# Patient Record
Sex: MALE | Race: WHITE | NOT HISPANIC OR LATINO | Employment: OTHER | URBAN - METROPOLITAN AREA
[De-identification: names, ages, dates, MRNs, and addresses within clinical notes are randomized per-mention and may not be internally consistent; named-entity substitution may affect disease eponyms.]

---

## 2020-05-11 ENCOUNTER — OFFICE VISIT (OUTPATIENT)
Dept: NEUROLOGY | Facility: CLINIC | Age: 70
End: 2020-05-11

## 2020-05-11 ENCOUNTER — TELEPHONE (OUTPATIENT)
Dept: NEUROLOGY | Facility: CLINIC | Age: 70
End: 2020-05-11

## 2020-05-11 VITALS
HEART RATE: 78 BPM | WEIGHT: 183 LBS | BODY MASS INDEX: 27.74 KG/M2 | OXYGEN SATURATION: 96 % | HEIGHT: 68 IN | SYSTOLIC BLOOD PRESSURE: 140 MMHG | DIASTOLIC BLOOD PRESSURE: 78 MMHG

## 2020-05-11 DIAGNOSIS — Z76.89 ENCOUNTER TO ESTABLISH CARE: Primary | ICD-10-CM

## 2020-05-11 DIAGNOSIS — G31.83 LEWY BODY DEMENTIA WITHOUT BEHAVIORAL DISTURBANCE (HCC): Primary | ICD-10-CM

## 2020-05-11 DIAGNOSIS — F02.80 LEWY BODY DEMENTIA WITHOUT BEHAVIORAL DISTURBANCE (HCC): Primary | ICD-10-CM

## 2020-05-11 PROCEDURE — 99204 OFFICE O/P NEW MOD 45 MIN: CPT | Performed by: PSYCHIATRY & NEUROLOGY

## 2020-05-11 RX ORDER — DONEPEZIL HYDROCHLORIDE 10 MG/1
10 TABLET, FILM COATED ORAL 2 TIMES DAILY
Qty: 180 TABLET | Refills: 3 | Status: SHIPPED | OUTPATIENT
Start: 2020-05-11

## 2020-05-11 RX ORDER — CITALOPRAM 10 MG/1
10 TABLET ORAL DAILY
COMMUNITY
Start: 2020-03-18 | End: 2020-05-11 | Stop reason: SDUPTHER

## 2020-05-11 RX ORDER — MEMANTINE HYDROCHLORIDE 10 MG/1
10 TABLET ORAL 2 TIMES DAILY
Qty: 180 TABLET | Refills: 3 | Status: SHIPPED | OUTPATIENT
Start: 2020-05-11

## 2020-05-11 RX ORDER — MEMANTINE HYDROCHLORIDE 10 MG/1
10 TABLET ORAL 2 TIMES DAILY
COMMUNITY
Start: 2020-04-04 | End: 2020-05-11 | Stop reason: SDUPTHER

## 2020-05-11 RX ORDER — VIT C/B6/B5/MAGNESIUM/HERB 173 50-5-6-5MG
CAPSULE ORAL
COMMUNITY
End: 2020-07-30

## 2020-05-11 RX ORDER — CITALOPRAM 10 MG/1
10 TABLET ORAL DAILY
Qty: 90 TABLET | Refills: 3 | Status: SHIPPED | OUTPATIENT
Start: 2020-05-11 | End: 2022-09-23

## 2020-05-11 RX ORDER — DONEPEZIL HYDROCHLORIDE 10 MG/1
10 TABLET, FILM COATED ORAL DAILY
COMMUNITY
Start: 2020-04-16 | End: 2020-05-11 | Stop reason: SDUPTHER

## 2020-05-11 RX ORDER — CHLORAL HYDRATE 500 MG
CAPSULE ORAL
COMMUNITY
End: 2020-07-30

## 2020-05-11 NOTE — TELEPHONE ENCOUNTER
Per Dr. Alejandro we will send referral to establish care at [facility] for primary care.

## 2020-05-11 NOTE — PROGRESS NOTES
"Subjective     CC: memory impairment    History of Present Illness   Emmanuel Bermudez is a 70 y.o. male who comes to clinic today for evaluation of cognitive impairment. His family has noted progressive symptoms since approximately 2017 marked initially by forgetfulness and word-finding difficulties . This has gradually worsened  over time. Additional associated symptoms have included impairments in essentially all spheres of cognition. There are fleeting visual hallucinations. He has also noted symptoms of micrographia, imbalance and bradykinesia.    He was initially diagnosed with [facility] in [place]. Subsequent evaluation at [facility] led to a diagnosis of DLB (with parkinsonism).    Prior evaluation has included screening blood work  and an MRI of the brain which were unremarkable . He is currently taking donepezil and memantine.    I have reviewed and confirmed the past family, social and medical history as accurate on 5/11/20.     Review of Systems   Constitutional: Negative.    Respiratory: Negative.    Cardiovascular: Negative.    Gastrointestinal: Negative.    Genitourinary: Negative.    Musculoskeletal: Negative.    All other systems reviewed and are negative.      Objective   General appearance today is normal.   Peripheral pulses were present and symmetric.   The ophthalmoscopic exam today is unremarkable. The discs and posterior elements are unremarkable.    /78   Pulse 78   Ht 172.7 cm (68\")   Wt 83 kg (183 lb)   SpO2 96%   BMI 27.83 kg/m²     Physical Exam   Neurological: He has normal strength. He has a normal Finger-Nose-Finger Test.   Reflex Scores:       Tricep reflexes are 1+ on the right side and 1+ on the left side.       Bicep reflexes are 1+ on the right side and 1+ on the left side.       Brachioradialis reflexes are 1+ on the right side and 1+ on the left side.       Patellar reflexes are 1+ on the right side and 1+ on the left side.       Achilles reflexes are 1+ on the right side and " 1+ on the left side.  Psychiatric: His speech is normal.        Neurologic Exam     Mental Status   Oriented to person.   Disoriented to place.   Oriented to time.   Registration: recalls 3 of 3 objects. Recall at 5 minutes: recalls 3 of 3 objects. Follows 3 step commands.   Attention: normal.   Speech: speech is normal   Level of consciousness: alert  Knowledge: good.   Able to name object. Able to read. Able to repeat. Able to write. Normal comprehension.     Cranial Nerves   Cranial nerves II through XII intact.     Motor Exam   Muscle bulk: normal  Overall muscle tone: normal    Strength   Strength 5/5 throughout. Mild-moderate bradykinesia     Sensory Exam   Light touch normal.     Gait, Coordination, and Reflexes     Gait  Gait: (decreased arm swing)    Coordination   Finger to nose coordination: normal    Reflexes   Right brachioradialis: 1+  Left brachioradialis: 1+  Right biceps: 1+  Left biceps: 1+  Right triceps: 1+  Left triceps: 1+  Right patellar: 1+  Left patellar: 1+  Right achilles: 1+  Left achilles: 1+      MMSE=25      Assessment/Plan   Emmanuel was seen today for np memory/dementia.    Diagnoses and all orders for this visit:    Lewy body dementia without behavioral disturbance (CMS/HCC)  -     Ambulatory Referral to Physical Therapy Evaluate and treat  -     Ambulatory Referral to Speech Therapy    Other orders  -     donepezil (ARICEPT) 10 MG tablet; Take 1 tablet by mouth 2 (Two) Times a Day.  -     memantine (NAMENDA) 10 MG tablet; Take 1 tablet by mouth 2 (Two) Times a Day.  -     citalopram (CeleXA) 10 MG tablet; Take 1 tablet by mouth Daily.          DISCUSSION/SUMMARY    Emmanuel Bermudez comes to clinic today with a history of DLB. I reviewed treatment strategies including increasing donepezil to 10 mg bid, a trial of Sinemet for his motoric symptoms, along with referral to PT, and referral to SLP for cognitive rehabilitation. He will then follow up in 6 months , or sooner if needed.     As  part of this visit I obtained additional history from the family which is incorporated in the HPI and review of records from his prior evaluation. Please see above for additional details.

## 2020-05-13 ENCOUNTER — OFFICE VISIT (OUTPATIENT)
Dept: PHYSICAL THERAPY | Facility: CLINIC | Age: 70
End: 2020-05-13

## 2020-05-13 DIAGNOSIS — R41.841 COGNITIVE COMMUNICATION DEFICIT: Primary | ICD-10-CM

## 2020-05-13 DIAGNOSIS — R47.1 DYSARTHRIA: ICD-10-CM

## 2020-05-13 DIAGNOSIS — G31.83 LEWY BODY DEMENTIA WITHOUT BEHAVIORAL DISTURBANCE (HCC): ICD-10-CM

## 2020-05-13 DIAGNOSIS — F02.80 LEWY BODY DEMENTIA WITHOUT BEHAVIORAL DISTURBANCE (HCC): ICD-10-CM

## 2020-05-13 PROCEDURE — 92523 SPEECH SOUND LANG COMPREHEN: CPT | Performed by: SPEECH-LANGUAGE PATHOLOGIST

## 2020-05-14 ENCOUNTER — TREATMENT (OUTPATIENT)
Dept: PHYSICAL THERAPY | Facility: CLINIC | Age: 70
End: 2020-05-14

## 2020-05-14 DIAGNOSIS — G89.29 CHRONIC RIGHT-SIDED LOW BACK PAIN WITHOUT SCIATICA: Primary | ICD-10-CM

## 2020-05-14 DIAGNOSIS — M54.50 CHRONIC RIGHT-SIDED LOW BACK PAIN WITHOUT SCIATICA: Primary | ICD-10-CM

## 2020-05-14 DIAGNOSIS — R26.9 GAIT DISTURBANCE: ICD-10-CM

## 2020-05-14 PROCEDURE — 97161 PT EVAL LOW COMPLEX 20 MIN: CPT | Performed by: PHYSICAL THERAPIST

## 2020-05-14 PROCEDURE — 97110 THERAPEUTIC EXERCISES: CPT | Performed by: PHYSICAL THERAPIST

## 2020-05-14 NOTE — PROGRESS NOTES
Physical Therapy Initial Evaluation and Plan of Care      Patient: Emmanuel Bermudez   : 1950  Diagnosis/ICD-10 Code:  No primary diagnosis found.  Referring practitioner: Jarred Alejandro MD  Date of Initial Visit: 2020  Today's Date: 2020  Patient seen for 1 sessions           Subjective Questionnaire: Oswestry:       Subjective Evaluation    History of Present Illness  Mechanism of injury: The pt stated that he has had R sided LBP for several years and has been treated in the past with injections and physical therapy. He did not get consistent relief from conservative treatments and ultimately had surgery about 1.5 years ago. He was unable to recall what kind of surgery he had but believes it was on his SIJ. He experienced relief from pain until about 3 months ago when the pain returned in the same area. He has since been unable to walk regularly and stated he is not one to push through the pain. He generally feels better when he sits down and uses back support but noted his pain is unpredictable and fluctuates frequently. He has been managing pain with acetaminophen and has tried heat, ice, and pain-relieving creams but does not feel they are effective. The pt has LBD and has been experiencing worsening gait, balance, and rigidity for the last few years. He has stairs at home and navigates them reciprocally with a hand rail. He has had 2-3 falls in the last year as a result of tripping but has not had any injuries. He does not use an AD.    Pain  Current pain rating: 3  At best pain ratin  At worst pain ratin  Location: R sided LBP  Quality: sharp and dull ache  Relieving factors: rest and support  Aggravating factors: ambulation, repetitive movement, prolonged positioning and lifting  Progression: worsening    Social Support  Lives in: multiple-level home    Diagnostic Tests  No diagnostic tests performed    Treatments  Previous treatment: medication, physical therapy and injection  treatment  Patient Goals  Patient goals for therapy: increased strength, independence with ADLs/IADLs, return to sport/leisure activities, increased motion, improved balance and decreased pain             Objective          Postural Observations    Additional Postural Observation Details  Flattened lower lumbar spine and severe FHP with inc thoracic kyphosis      Palpation   Left   Muscle spasm in the erector spinae, lumbar paraspinals and quadratus lumborum.   Tenderness of the erector spinae, lumbar paraspinals and quadratus lumborum.     Right   Muscle spasm in the erector spinae, lumbar paraspinals and quadratus lumborum. Tenderness of the erector spinae, lumbar paraspinals and quadratus lumborum.     Tenderness     Lumbar Spine  Tenderness in the facet joint (L2-4). No tenderness in the spinous process.     Left Hip   No tenderness in the PSIS.     Right Hip   No tenderness in the PSIS.     Neurological Testing     Sensation     Lumbar   Left   Intact: light touch    Right   Intact: light touch    Reflexes   Left   Patellar (L4): normal (2+)  Achilles (S1): normal (2+)    Right   Patellar (L4): brisk (3+)  Achilles (S1): normal (2+)    Active Range of Motion     Additional Active Range of Motion Details  Lumbar flexion: 7 cm to the floor   Lumbar extension: 50%  R Lateral flexion: 0 cm to KJL  L Lateral flexion: 0 cm to KJL  R Rotation: 40 deg  L Rotation: 52 deg      Strength/Myotome Testing     Left Hip   Planes of Motion   Flexion: 4+  Extension: 4-  Abduction: 4    Right Hip   Planes of Motion   Flexion: 4+  Extension: 4-  Abduction: 4-    Left Knee   Flexion: 4+  Extension: 4+    Right Knee   Flexion: 4+  Extension: 4+    Additional Strength Details  Mod bradykinesia      Tests     Lumbar     Left   Negative quadrant.     Right   Negative quadrant.     Left Pelvic Girdle/Sacrum   Negative: sacrum compression, sacral spring and thigh thrust.     Right Pelvic Girdle/Sacrum   Negative: sacrum compression,  sacral spring and thigh thrust.     Ambulation     Comments   The pt ambulated with a crouched posture, dec toño, and reduced step length bilaterally.          Assessment & Plan     Assessment  Impairments: abnormal muscle firing, abnormal or restricted ROM, activity intolerance, impaired physical strength, lacks appropriate home exercise program and pain with function  Assessment details: The patient is a 69 yo male who presented to PT with evolving characteristics of chronic R sided LBP with low complexity. Signs and symptoms are consistent with lumbar hypomobility and associated inc mm tension. He also has LBD that appears to have caused bradykinesia, balance deficits, and rigidity that may be addressed with PT once back pain has resolved. He had back surgery appx 1.5 years ago and the pain is localized to the area around the scar. He had hypomobility of the lumbar spine with PA assessment and palpation of the R facets from L2-4 reproduced pain. Pain was also reproduced palpation of the R sided lumbar mm and inc mm tension was noted throughout. He was prescribed an HEP for lumbar mobility exercises and core strengthening. I expect the patient to make a timely recovery with skilled PT intervention.     Prognosis: good  Functional Limitations: carrying objects, lifting, walking, uncomfortable because of pain, stooping and unable to perform repetitive tasks  Goals  Plan Goals: Short Term Goals (4 weeks):     1. The patient will be independent and compliant with initial HEP.     2. The patient will report pain at rest 0/10 or less and worst pain 4/10 or less.    3. The patient will display decreased TTP in the lumbar spine and dec mm tension in the surrounding musculature.    4. B hip strength will improve to 4+/5 in abd and ext.     5. JAGRUTI will improve by 6 points or more.     6. Distal symptoms will centralize above the knee.     7. The patient will perform 10 STS with appropriate knee position and no inc in  pain.      Long Term Goals (8 weeks):     1. The patient will be appropriate for independent management and compliant with progressed HEP.     2. The patient will report pain at rest 0/10 or less and worst pain 2/10 or less.    3. The patient will return to work duties and/or ADLs with no limitations due to LBP.     4. The patient will return to recreational and community activities with no limitations due to LBP.     5. The patient will report no distal symptoms.       Plan  Therapy options: will be seen for skilled physical therapy services  Planned modality interventions: cryotherapy, electrical stimulation/Russian stimulation, TENS, iontophoresis, thermotherapy (hydrocollator packs) and traction  Planned therapy interventions: abdominal trunk stabilization, manual therapy, motor coordination training, neuromuscular re-education, ADL retraining, body mechanics training, flexibility, functional ROM exercises, home exercise program, joint mobilization, postural training, soft tissue mobilization, spinal/joint mobilization, strengthening, stretching and therapeutic activities  Frequency: 1x week  Duration in visits: 8  Duration in weeks: 8  Treatment plan discussed with: patient  Plan details: The patient will likely benefit from TE/NMED to include postural reeducation and core strengthening, MT for improved joint mobility, and TA for activity modification and lifting mechanics. Modalities will be utilized as needed for pain modulation.         Visit Diagnoses:  No diagnosis found.    Timed:  Manual Therapy:    0     mins  18619;  Therapeutic Exercise:    15     mins  25724;     Neuromuscular Ian:    0    mins  58317;    Therapeutic Activity:     0     mins  85202;     Gait Trainin     mins  46329;     Ultrasound:     0     mins  04290;    Electrical Stimulation:    0     mins  06954 ( );    Untimed:  Electrical Stimulation:    0     mins  00162 ( );  Mechanical Traction:    0     mins   22286;     Timed Treatment:   15   mins   Total Treatment:     50   mins    PT SIGNATURE: Pramod Dunlap, LAURA   DATE TREATMENT INITIATED: 5/14/2020    Initial Certification  Certification Period: 8/12/2020  I certify that the therapy services are furnished while this patient is under my care.  The services outlined above are required by this patient, and will be reviewed every 90 days.     PHYSICIAN: Jarred Alejandro MD      DATE:     Please sign and return via fax to  .. Thank you, [facility].

## 2020-05-20 ENCOUNTER — TREATMENT (OUTPATIENT)
Dept: PHYSICAL THERAPY | Facility: CLINIC | Age: 70
End: 2020-05-20

## 2020-05-20 DIAGNOSIS — Z78.9 IMPAIRED MOBILITY AND ADLS: Primary | ICD-10-CM

## 2020-05-20 DIAGNOSIS — R47.1 DYSARTHRIA: ICD-10-CM

## 2020-05-20 DIAGNOSIS — R29.898 DECREASED GRIP STRENGTH: ICD-10-CM

## 2020-05-20 DIAGNOSIS — Z74.09 IMPAIRED MOBILITY AND ADLS: Primary | ICD-10-CM

## 2020-05-20 DIAGNOSIS — G31.83 LEWY BODY DEMENTIA WITHOUT BEHAVIORAL DISTURBANCE (HCC): ICD-10-CM

## 2020-05-20 DIAGNOSIS — F02.80 LEWY BODY DEMENTIA WITHOUT BEHAVIORAL DISTURBANCE (HCC): ICD-10-CM

## 2020-05-20 DIAGNOSIS — R27.8 DECREASED COORDINATION: ICD-10-CM

## 2020-05-20 DIAGNOSIS — R41.841 COGNITIVE COMMUNICATION DEFICIT: Primary | ICD-10-CM

## 2020-05-20 PROCEDURE — 92507 TX SP LANG VOICE COMM INDIV: CPT | Performed by: SPEECH-LANGUAGE PATHOLOGIST

## 2020-05-20 PROCEDURE — 97166 OT EVAL MOD COMPLEX 45 MIN: CPT | Performed by: OCCUPATIONAL THERAPIST

## 2020-05-20 NOTE — PROGRESS NOTES
"Outpatient Speech Language Pathology   Adult Speech Language Cognitive Treatment Note       Patient Name: Emmanuel Bermudez  : 1950  MRN: [id number]  Today's Date: 2020         Visit Date: 2020   Patient Active Problem List   Diagnosis   • Lewy body dementia without behavioral disturbance (CMS/HCC)          Visit Dx:    ICD-10-CM ICD-9-CM   1. Cognitive communication deficit R41.841 799.52   2. Dysarthria R47.1 784.51   3. Lewy body dementia without behavioral disturbance (CMS/HCC) G31.83 331.82    F02.80 294.10     Pain ratin- back pain  Subjective: \"Nothing new\". Had OT evaluation before session today.     LTGs   Pt will comprehend spoken information in all settings at 80% with cues   -Initiated goals today. Went over evaluation results   Pt and family will implement compensatory strategies to maximize patient’s memory function so patient can continue to participate in daily activities at 80% with cues   -Introduced and modeled some compensatory strategies for memory today for application at home   STGs  Pt will improve comprehension of spoken language by following 1 step directions at 80% with cues   -70% today with no cues   Pt will demonstrate comprehension of spoken language by answering simple general information questions at 80% with cues   -75% today with no cues with general information questions   Pt will improve executive functioning skills by using planning strategies prior to beginning tasks at 80% with cues   -Modeled planning strategies throughout tasks. Continue to model and target   Pt will improve executive functioning skills by using self-monitoring strategies during functional tasks at 80% with cues   -Modeled self monitoring strategies today before/during/ after tasks.    Pt will improve respiratory support and the use of respiration for speech through use of diaphragmatic breathing and prolonging phonation of a vowel sound at 80% with cues    -Introduced diaphragmatic " "breathing today. Modeled for pt and he was able to perform x20   Pt will improve comprehensibility of verbal messages by speaking at an appropriate vocal intensity at 80% with cues   -Introduced \"thinking loud\" and increasing vocal volume with functional phrase list made in session   Pt’s memory skills will be enhanced as reported by patient using external memory aides at 80% with cues   -Made a daily schedule together, modeled how to utilize at home. Sent home example sheet  Pts’s memory skills will be enhanced as reported by caregiver by using a memory book developed by SLP and family to help patient recall important personal information. at 80% with cues   -introduced concept of memory book today. Pt was hesitant about it.   Recertification: 8/12/2020          OP SLP Education     Row Name 05/20/20 1100       Education    Barriers to Learning  No barriers identified  -RB    Education Provided  Described results of evaluation;Patient expressed understanding of evaluation;Patient demonstrated recommended strategies;Patient requires further education on strategies, risks  -RB    Assessed  Learning needs;Learning motivation;Learning preferences;Learning readiness  -RB    Learning Motivation  Strong  -RB    Learning Method  Explanation;Demonstration;Teach back;Written materials  -RB    Teaching Response  Verbalized understanding;Demonstrated understanding;Reinforcement needed  -RB    Education Comments  breathing excercises, word finding cue sheet, functional phrase list for dysarthria   -RB      User Key  (r) = Recorded By, (t) = Taken By, (c) = Cosigned By    Initials Name Effective Dates    Julianna Hodges SLP 02/28/20 -           OP SLP Assessment/Plan - 05/20/20 1100        SLP Assessment    Functional Problems  Speech Language- Adult/Cognition   -RB    Impact on Function: Adult Speech Language/Cognition  Difficulty communicating wants, needs, and ideas;Difficulty communicating in a medical " emergency;Restrictions in personal and social life;Difficulty sequencing thoughts to express complex messages;Poor attention to task;Trouble learning or remembering new information;Difficulty participating in avocational activities;Decreased recall of personal information and medical history   -RB    Clinical Impression: Speech Language-Adult/Congnition  Moderate-Severe:;Cognitive Communication Impairment   -RB    Functional Problems Comment  Pt's independence is impacted by cognitive deficits    -RB    Clinical Impression Comments  Pt was recpetive to treatment and strategies presented today    -RB    Please refer to paper survey for additional self-reported information  Yes   -RB    Please refer to items scanned into chart for additional diagnostic informaiton and handouts as provided by clinician  Yes   -RB    SLP Diagnosis  moderate-severe cognitive communication deficit    -RB    Prognosis  Good (comment)   -RB    Patient/caregiver participated in establishment of treatment plan and goals  Yes   -RB    Patient would benefit from skilled therapy intervention  Yes   -RB       SLP Plan    Frequency  1x/weekly   -RB    Duration  11 weeks    -RB    Planned CPT's?  SLP INDIVIDUAL SPEECH THERAPY: 95055   -RB    Expected Duration Therapy Session - minutes  45-60 minutes   -RB    Plan Comments  continue plan of care    -RB      User Key  (r) = Recorded By, (t) = Taken By, (c) = Cosigned By    Initials Name Provider Type    RB Julianna Klein SLP Speech and Language Pathologist              Julianna Klein MA CCC-SLP  5/20/2020

## 2020-05-20 NOTE — PROGRESS NOTES
"Occupational Therapy Initial Evaluation and Plan of Care      Patient: Emmanuel Bermudez   : 1950  Diagnosis/ICD-10 Code:  Impaired mobility and ADLs [Z74.09]  Referring practitioner: Jarred Alejandro MD  Date of Initial Visit: Type: THERAPY  Noted: 2020  Today's Date: 2020  Patient seen for 1 sessions      Subjective:     Subjective Questionnaire: 9HPT R: 28.60  L: 36.498  AMPAC OT 22  PURDUE PEGBOARD R: 6  L:  8  ROW: 5  ASSEMBLY:  10      Subjective Evaluation    History of Present Illness  Date of onset: 2017  Mechanism of injury: Pt with progressive symptoms starting in 2017 with forgetfulness, word-finding difficulty, visual hallucinations, micrographia, bradykinesia and impaired balance and FMC. Pt lives with wife and recently moved to [place] from [place]. Pt being seen by Dr. Alejandro and sx with Lewy Body Dementia. Pt notes various parkinsonian symptoms including decreased bilateral arm swing, tremor of UE's (L worse than R), decreased step length and stiffness. He states his HW has gotten nearly illegible. He is still able to perform most ADL tasks however requires significant more time to get ready, increased effort to complete transfers and bed mobility. He also suffers from significant low back pain and is being seen by PT for this.     Subjective comment: \"I want to be able to at least care for myself\"  Patient Occupation: retired    Precautions and Work Restrictions: memory, fallQuality of life: fair    Pain  Current pain ratin  At best pain ratin  At worst pain rating: 10  Location: low back  Aggravating factors: ambulation (standing)    Social Support  Patient lives at: walk-in shower.  Lives with: spouse    Hand dominance: right    Patient Goals  Patient goals for therapy: increased strength, independence with ADLs/IADLs, increased motion, improved balance and decreased pain  Patient goal: improve FMC         Objective          Active Range of Motion   Left Shoulder   Flexion: " WFL  Extension: WFL  Abduction: WFL  Adduction: WFL    Right Shoulder   Flexion: WFL  Extension: WFL  Abduction: WFL  Adduction: WFL    Left Elbow   Flexion: WFL  Extension: WFL  Forearm supination: WFL  Forearm pronation: WFL    Right Elbow   Flexion: WFL  Extension: WFL  Forearm supination: WFL  Forearm pronation: WFL    Additional Active Range of Motion Details  Mild stiffness noted during pronation/supination  Able to oppose bilaterally however w/decreased speed  Finger to nose, eyes closed intact, bilaterally  Slight stiffness noted with wrist f/e    Strength/Myotome Testing     Left Shoulder     Planes of Motion   Flexion: 4+   Abduction: 4+     Right Shoulder     Planes of Motion   Flexion: 4+   Abduction: 4+     Left Wrist/Hand      (2nd hand position)     Trial 1: 40 lbs    Trial 2: 45 lbs    Trial 3: 45 lbs    Average: 43.33 lbs    Thumb Strength  Key/Lateral Pinch     Trial 1: 18 lbs    Trial 2: 18 lbs    Trial 3: 17 lbs    Average: 17.67 lbs    Right Wrist/Hand      (2nd hand position)     Trial 1: 65 lbs    Trial 2: 60 lbs    Trial 3: 60 lbs    Average: 61.67 lbs    Thumb Strength   Key/Lateral Pinch     Trial 1: 20 lbs    Trial 2: 21 lbs    Trial 3: 21 lbs    Average: 20.67 lbs    Additional Strength Details  Pt denies any N/T in BUEs    Ambulation     Ambulation: Level Surfaces     Additional Level Surfaces Ambulation Details  Decreased bilateral arm swing, no use of AD  Slight forward flexed posture         OT Neuro         OT Exercises     Row Name 05/20/20 0930             Exercise 1    Exercise Name 1  OT IE completed per consult. See other flow sheets for additional info  -ST        User Key  (r) = Recorded By, (t) = Taken By, (c) = Cosigned By    Initials Name Provider Type    Polina Garcia, OTR Occupational Therapist           Assessment & Plan     Assessment  Impairments: abnormal coordination, abnormal gait, activity intolerance, impaired balance, impaired physical strength,  lacks appropriate home exercise program and pain with function  Assessment details: Pt presents with deficits in hand strength and FMC impacting ADL and IADL performance along with decreased standing balance and gait abnormality demonstrating decreased bilateral arm swing. Pt to benefit from hand strengthening, FMC training, AE/DME needs and balance training to improve transfers, standing and ADLs/IADLs.   Prognosis: fair  Functional Limitations: lifting, walking, pushing, uncomfortable because of pain, moving in bed, standing and stooping  Goals  Plan Goals: OT neuro goals      Pt will score 23 seconds or less R & 31 seconds or less L on the 9HPT to demonstrate improved accuracy and speed with fine motor coordination by 8 wks.      Pt will increase L  strength by 5 # by 8 wks to demonstrate improved strength and function for daily tasks.     Pt will be independent with hand strengthening HEP to increase independence with ADL/IADL performance tasks by 4 wks.    Pt will be independent with hand FMC HEP to increase independence with ADL/IADL performance tasks by 4 wks.              Plan  Planned therapy interventions: ADL retraining, balance/weight-bearing training, body mechanics training, fine motor coordination training, flexibility, functional ROM exercises, home exercise program, IADL retraining, motor coordination training, neuromuscular re-education, strengthening, stretching, therapeutic activities and transfer training  Frequency: 1x week  Duration in weeks: 8  Treatment plan discussed with: patient  Plan details: Est. OT POC and goals to address above deficits and improve areas of safety and engagement in ADLs, IADLs, standing tolerance, transfers and balance.         Timed:  Manual Therapy:    0     mins  21618;  Therapeutic Exercise:    0     mins  87953;     Neuromuscular Ian:    0    mins  78071;    Therapeutic Activity:     0     mins  23168;     Self-Care/ADL     0     mins  38645;   Sensory Int.  Tech      0     mins 54777;  Ultrasound:     0     mins  52255;    Electrical Stimulation:    0     mins  55743 ( );    Untimed:  Electrical Stimulation:    0     mins  82637 ( );    Timed Treatment:   40   mins   Total Treatment:     40   mins    OT Signature: Polina Holbrook MS, OTR/L, CDP  [place] License #: [id number]  DATE TREATMENT INITIATED: 5/20/2020    Initial Certification Certification Period: 8/18/2020  I certify that the therapy services are furnished while this patient is under my care. The services outlined above are required by this patient and will be reviewed every 90 days.     Physician Signature: __________________________________  Jarred Alejandro MD    Please sign and return via fax to [phone]  Thank you,   [facility] Occupational Therapy

## 2020-05-22 ENCOUNTER — TREATMENT (OUTPATIENT)
Dept: PHYSICAL THERAPY | Facility: CLINIC | Age: 70
End: 2020-05-22

## 2020-05-22 DIAGNOSIS — R26.9 GAIT DISTURBANCE: ICD-10-CM

## 2020-05-22 DIAGNOSIS — M54.50 CHRONIC RIGHT-SIDED LOW BACK PAIN WITHOUT SCIATICA: Primary | ICD-10-CM

## 2020-05-22 DIAGNOSIS — G89.29 CHRONIC RIGHT-SIDED LOW BACK PAIN WITHOUT SCIATICA: Primary | ICD-10-CM

## 2020-05-22 PROCEDURE — 97110 THERAPEUTIC EXERCISES: CPT | Performed by: PHYSICAL THERAPIST

## 2020-05-22 PROCEDURE — 97140 MANUAL THERAPY 1/> REGIONS: CPT | Performed by: PHYSICAL THERAPIST

## 2020-05-22 NOTE — PROGRESS NOTES
Physical Therapy Daily Progress Note      Patient: Emmanuel Bermudez   : 1950  Referring practitioner: Jarred Alejandro MD  Date of Initial Visit: Type: THERAPY  Noted: 2020  Today's Date: 2020  Patient seen for 2 sessions           Subjective Evaluation    History of Present Illness  Mechanism of injury: The pt stated that his back has felt about the same this week but noted some improvement with HEP performance. He reported feeling groggy this morning and stated mornings are also the worst for his back pain. He stated he has noticed that his back pain gets a little better throughout the day, but pain fluctuates with activity. He is concerned with his ability to sit and stand from a chair and requested to work on it.    Pain  Current pain ratin  Location: LBP           Objective   See Exercise, Manual, and Modality Logs for complete treatment.       Assessment & Plan     Assessment  Assessment details: The pt was lethargic upon presentation and did not display the enthusiasm that he did during his IE. He had a slight inc in LBP today as compared to his last visit but it may be attributable to the time of day, as he reported mornings being worse. He demonstrated proficiency with most of his HEP exercises but struggled with the core control to perform a PPT. Several cues and demonstrations were provided but no consistency was obtained. He reported difficulty with STS so technique was reviewed and they were practiced from an elevated mat table. He was able to perform 10 consecutive STS with no use of the hands and no complaints of pain. MT was attempted including PA glides and STM to the R sided lumbar mm but was not tolerated very well. Significant mm tension was noted in the paraspinals and QL but I was unable to perform DFM to the area. Heat was applied following treatment.    Plan  Plan details: Continue progression of core strengthening exercises, stretches for post mm, and MT.        Visit  Diagnoses:    ICD-10-CM ICD-9-CM   1. Chronic right-sided low back pain without sciatica M54.5 724.2    G89.29 338.29   2. Gait disturbance R26.9 781.2       Progress per Plan of Care           Timed:  Manual Therapy:    15     mins  37162;  Therapeutic Exercise:    30     mins  22917;     Neuromuscular Ian:    0    mins  44515;    Therapeutic Activity:     0     mins  89910;     Gait Trainin     mins  64799;     Ultrasound:     0     mins  53678;    Electrical Stimulation:    0     mins  98317 ( );    Untimed:  Electrical Stimulation:    0     mins  91926 ( );  Mechanical Traction:    0     mins  71629;     Timed Treatment:   45   mins   Total Treatment:     60   mins  Pramod Dunlap PT  Physical Therapist

## 2020-05-27 ENCOUNTER — TREATMENT (OUTPATIENT)
Dept: PHYSICAL THERAPY | Facility: CLINIC | Age: 70
End: 2020-05-27

## 2020-05-27 DIAGNOSIS — Z74.09 IMPAIRED MOBILITY AND ADLS: Primary | ICD-10-CM

## 2020-05-27 DIAGNOSIS — R27.8 DECREASED COORDINATION: ICD-10-CM

## 2020-05-27 DIAGNOSIS — Z78.9 IMPAIRED MOBILITY AND ADLS: Primary | ICD-10-CM

## 2020-05-27 PROCEDURE — 97110 THERAPEUTIC EXERCISES: CPT | Performed by: OCCUPATIONAL THERAPIST

## 2020-05-27 PROCEDURE — 97535 SELF CARE MNGMENT TRAINING: CPT | Performed by: OCCUPATIONAL THERAPIST

## 2020-05-27 NOTE — PROGRESS NOTES
"Occupational Therapy Daily Progress Note  Visit: 2  Date of Initial Visit: Type: THERAPY  Noted: 2020      Patient: Emmanuel Bermudez   : 1950  Diagnosis/ICD-10 Code:  Impaired mobility and ADLs [Z74.09]  Referring practitioner: Jarred Alejandro MD  Date of Initial Visit: Type: THERAPY  Noted: 2020  Today's Date: 2020  Patient seen for 2 sessions      Subjective:   Patient reports: \"I tried to play with the grandchildren some over the weekend   Pain: 0/10  Subjective Questionnaire: n/a  Clinical Progress: unchanged  Home Program Compliance: Yes  Treatment has included: therapeutic exercise and self-care/ADL retraining    Subjective   Objective     OT Neuro         OT Exercises     Row Name 20 0934             Exercise 1    Exercise Name 1  AE training with reacher use for donning/doffing LB clothing to increase efficiency and pain/comfort level   -ST         Exercise 2    Exercise Name 2  FMC with use of flexi-puzzle to address translation and in-hand manipulation alternating R and L hands to increase speed and accuracy of movements, progressed to using wing nut and bolt  -ST         Exercise 3    Exercise Name 3  use of medium soft theraputty for pincer and lateral  strength to increase carry over with ADLs and IADLs  -ST        User Key  (r) = Recorded By, (t) = Taken By, (c) = Cosigned By    Initials Name Provider Type    Polina Garcia OTR Occupational Therapist           Assessment & Plan     Assessment  Assessment details: Pt's parkinsonian symptoms affect balance and bilateral arm swing, educated pt on increasing step length with heel to toe strike and use of arm swing to aid with balance and stability. Pt demonstrates slow, deliberate movements w/ FMC and hand strengthening tasks, requiring additional time and effort, rita with use of flexi-puzzle and any translation/in-hand manipulation. Issued HEPs, pt with appropriate teach-back. Educated and demo'ed use of reacher for " donning/doffing pants/other LB clothing for improving pain/comfort however pt not interested in pursing AE use at this time. Will continue to progress complexity and difficulty of FMC/strengthening tasks as tolerated     Plan  Plan details: Continue POC and goals as previously est to achieve goals and improve ADLs and IADLs.        Visit Diagnoses:    ICD-10-CM ICD-9-CM   1. Impaired mobility and ADLs Z74.09 799.89   2. Decreased coordination R27.9 781.3             Timed:  Manual Therapy:    0     mins  26169;  Therapeutic Exercise:    23     mins  05887;     Neuromuscular Ian:    0    mins  31061;    Therapeutic Activity:     0     mins  68015;     Self-Care/ADL     15     mins  79737;   Sensory Int. Tech      0     mins 98465;  Ultrasound:     0     mins  72055;    Electrical Stimulation:    0     mins  97944 ( );    Untimed:  Electrical Stimulation:    0     mins  43124 ( );    Timed Treatment:   38   mins   Total Treatment:     38   mins    OT Signature: Polina Hlobrook MS, OTR/L, JESSIE  [place] License #: [id number]

## 2020-05-29 ENCOUNTER — TREATMENT (OUTPATIENT)
Dept: PHYSICAL THERAPY | Facility: CLINIC | Age: 70
End: 2020-05-29

## 2020-05-29 DIAGNOSIS — G31.83 LEWY BODY DEMENTIA WITHOUT BEHAVIORAL DISTURBANCE (HCC): ICD-10-CM

## 2020-05-29 DIAGNOSIS — R47.1 DYSARTHRIA: ICD-10-CM

## 2020-05-29 DIAGNOSIS — R26.9 GAIT DISTURBANCE: ICD-10-CM

## 2020-05-29 DIAGNOSIS — R41.841 COGNITIVE COMMUNICATION DEFICIT: Primary | ICD-10-CM

## 2020-05-29 DIAGNOSIS — M54.50 CHRONIC RIGHT-SIDED LOW BACK PAIN WITHOUT SCIATICA: Primary | ICD-10-CM

## 2020-05-29 DIAGNOSIS — G89.29 CHRONIC RIGHT-SIDED LOW BACK PAIN WITHOUT SCIATICA: Primary | ICD-10-CM

## 2020-05-29 DIAGNOSIS — F02.80 LEWY BODY DEMENTIA WITHOUT BEHAVIORAL DISTURBANCE (HCC): ICD-10-CM

## 2020-05-29 PROCEDURE — 92507 TX SP LANG VOICE COMM INDIV: CPT | Performed by: SPEECH-LANGUAGE PATHOLOGIST

## 2020-05-29 PROCEDURE — 97110 THERAPEUTIC EXERCISES: CPT | Performed by: PHYSICAL THERAPIST

## 2020-05-29 PROCEDURE — 97140 MANUAL THERAPY 1/> REGIONS: CPT | Performed by: PHYSICAL THERAPIST

## 2020-05-29 NOTE — PROGRESS NOTES
Physical Therapy Daily Progress Note      Patient: Emmanuel Bermudez   : 1950  Referring practitioner: Jarred Alejandro MD  Date of Initial Visit: Type: THERAPY  Noted: 2020  Today's Date: 2020  Patient seen for 3 sessions           Subjective Evaluation    History of Present Illness  Mechanism of injury: The pt stated that his back pain has fluctuated throughout the week, noting some days with minimal pain and some days with more severe pain. He has not attempted to perform any of his HEP exercises when his pain is increased and simply rests. He stated he is fearful of activities that he thinks may increase pain and avoids them as able. He was unable to remember if his exercises are helping in the moment and could not recall the surgery that he had.     Pain  Current pain ratin  Location: LBP           Objective          Palpation   Left   Hypertonic in the erector spinae and quadratus lumborum.   Tenderness of the erector spinae and quadratus lumborum.     Right   Hypertonic in the erector spinae and quadratus lumborum. Tenderness of the erector spinae and quadratus lumborum.       See Exercise, Manual, and Modality Logs for complete treatment.       Assessment & Plan     Assessment  Assessment details: The pt continues to experience relatively unchanged LBP with excessive mm tension in the lumbar paraspinals and QLs. He is unable to recall how his exercises impact him at home and cannot remember the surgery that he had. Both of these memory limitations are a barrier to rehab, because it is difficult to assess his response to prescribed exercises and activities. I wrote him a reminder to bring his surgical health records so that I could have a better picture of his current spine health. Functional WB exercises were introduced today and were tolerated fairly well, but he reported inc R sided LBP with hip extension and with standing on the R LE. He struggled with symmetrical positioning with  STS, leading towards the R and shifting his weight over that LE. When this was corrected, he had a dec in pain and improvement in efficiency. He was encouraged to work on this in front of a mirror at home.    Plan  Plan details: Continue progressions of MT and WB core and LE exercises.        Visit Diagnoses:    ICD-10-CM ICD-9-CM   1. Chronic right-sided low back pain without sciatica M54.5 724.2    G89.29 338.29   2. Gait disturbance R26.9 781.2       Progress per Plan of Care           Timed:  Manual Therapy:    10     mins  21307;  Therapeutic Exercise:    30     mins  52626;     Neuromuscular Ian:    0    mins  26080;    Therapeutic Activity:     0     mins  47498;     Gait Trainin     mins  98618;     Ultrasound:     0     mins  20573;    Electrical Stimulation:    0     mins  78429 ( );    Untimed:  Electrical Stimulation:    0     mins  98099 ( );  Mechanical Traction:    0     mins  90998;     Timed Treatment:   40   mins   Total Treatment:     52   mins  Pramod Dunlap PT  Physical Therapist

## 2020-05-29 NOTE — PROGRESS NOTES
Outpatient Speech Language Pathology   Adult Speech Language Cognitive Treatment Note       Patient Name: Emmanuel Bermudez  : 1950  MRN: [id number]  Today's Date: 2020         Visit Date: 2020   Patient Active Problem List   Diagnosis   • Lewy body dementia without behavioral disturbance (CMS/HCC)          Visit Dx:    ICD-10-CM ICD-9-CM   1. Cognitive communication deficit R41.841 799.52   2. Dysarthria R47.1 784.51   3. Lewy body dementia without behavioral disturbance (CMS/HCC) G31.83 331.82    F02.80 294.10        Pain ratin- back pain  Subjective: Pt reports not knowing if he wants intervention and that he feels acceptance with his condition. Discussed this at length.     LTGs   Pt will comprehend spoken information in all settings at 80% with cues   -Addressed comprehension goals today   Pt and family will implement compensatory strategies to maximize patient’s memory function so patient can continue to participate in daily activities at 80% with cues   -modeled some compensatory strategies for memory today for application at home, Discussed ways to incorporate strategies at home    STGs  Pt will improve comprehension of spoken language by following 1 step directions at 80% with cues   -75% today with no cues   Pt will demonstrate comprehension of spoken language by answering simple general information questions at 80% with cues   -80% today with no cues with general information questions. MET x1    Pt will improve executive functioning skills by using planning strategies prior to beginning tasks at 80% with cues   -Modeled planning strategies throughout tasks. 65%  Pt will improve executive functioning skills by using self-monitoring strategies during functional tasks at 80% with cues   -Modeled self monitoring strategies today before/during/ after tasks. 70%  Pt will improve respiratory support and the use of respiration for speech through use of diaphragmatic breathing and prolonging  "phonation of a vowel sound at 80% with cues    - Modeled for pt and he was able to perform x20   Pt will improve comprehensibility of verbal messages by speaking at an appropriate vocal intensity at 80% with cues   -Introduced \"thinking loud\" and increasing vocal volume with functional  reading made in session. Discussed practicing on the phone and with bible verses when he reads at home. Able to perform at 70%    Pt’s memory skills will be enhanced as reported by patient using external memory aides at 80% with cues   -Made a daily schedule together, modeled how to utilize at home.   Pts’s memory skills will be enhanced as reported by caregiver by using a memory book developed by SLP and family to help patient recall important personal information. at 80% with cues   -Discussed a memory book with people's names and pictures   Recertification: 8/12/2020            OP SLP Education     Row Name 05/29/20 1000       Education    Barriers to Learning  No barriers identified  -RB    Education Provided  Patient demonstrated recommended strategies;Patient requires further education on strategies, risks  -RB    Assessed  Learning needs;Learning preferences;Learning readiness;Learning motivation  -RB    Learning Motivation  Moderate  -RB    Learning Method  Explanation;Demonstration;Written materials  -RB    Teaching Response  Verbalized understanding;Demonstrated understanding;Reinforcement needed  -RB    Education Comments  memory strategy sheet, ideas on how to incorporate strategies for speech and cognition at home   -RB      User Key  (r) = Recorded By, (t) = Taken By, (c) = Cosigned By    Initials Name Effective Dates    Julianna Hodges SLP 02/28/20 -           OP SLP Assessment/Plan - 05/29/20 1000        SLP Assessment    Functional Problems  Speech Language- Adult/Cognition   -RB    Impact on Function: Adult Speech Language/Cognition  Difficulty communicating wants, needs, and ideas;Difficulty communicating in a " "medical emergency;Restrictions in personal and social life;Difficulty participating in avocational activities;Trouble learning or remembering new information;Poor attention to task   -RB    Clinical Impression: Speech Language-Adult/Congnition  Moderate-Severe:;Cognitive Communication Impairment   -RB    Functional Problems Comment  Pt's independence is impacted by deficits    -RB    Clinical Impression Comments  Pt was able to utilize strategies. However, pt notes he has \"accepted\" where he is at and is not sure he wants intervention or services. SLP respects that choice. Pt noted he would think about if he wants to continue to work on skills    -RB    Please refer to paper survey for additional self-reported information  Yes   -RB    Please refer to items scanned into chart for additional diagnostic informaiton and handouts as provided by clinician  Yes   -RB    SLP Diagnosis  moderate-severe cognitive communication deficit    -RB    Prognosis  Good (comment)   -RB    Patient/caregiver participated in establishment of treatment plan and goals  Yes   -RB    Patient would benefit from skilled therapy intervention  Yes   -RB       SLP Plan    Frequency  1x/weekly   -RB    Duration  10 weeks    -RB    Planned CPT's?  SLP INDIVIDUAL SPEECH THERAPY: 52380   -RB    Expected Duration Therapy Session - minutes  45-60 minutes   -RB    Plan Comments  continue plan of care    -RB      User Key  (r) = Recorded By, (t) = Taken By, (c) = Cosigned By    Initials Name Provider Type    Julianna Hodges SLP Speech and Language Pathologist              Julianna Klein MA CCC-SLP  5/29/2020  "

## 2020-06-03 ENCOUNTER — TREATMENT (OUTPATIENT)
Dept: PHYSICAL THERAPY | Facility: CLINIC | Age: 70
End: 2020-06-03

## 2020-06-03 DIAGNOSIS — R47.1 DYSARTHRIA: ICD-10-CM

## 2020-06-03 DIAGNOSIS — Z74.09 IMPAIRED MOBILITY AND ADLS: Primary | ICD-10-CM

## 2020-06-03 DIAGNOSIS — R27.8 DECREASED COORDINATION: ICD-10-CM

## 2020-06-03 DIAGNOSIS — R41.841 COGNITIVE COMMUNICATION DEFICIT: Primary | ICD-10-CM

## 2020-06-03 DIAGNOSIS — G31.83 LEWY BODY DEMENTIA WITHOUT BEHAVIORAL DISTURBANCE (HCC): ICD-10-CM

## 2020-06-03 DIAGNOSIS — R29.898 DECREASED GRIP STRENGTH: ICD-10-CM

## 2020-06-03 DIAGNOSIS — Z78.9 IMPAIRED MOBILITY AND ADLS: Primary | ICD-10-CM

## 2020-06-03 DIAGNOSIS — F02.80 LEWY BODY DEMENTIA WITHOUT BEHAVIORAL DISTURBANCE (HCC): ICD-10-CM

## 2020-06-03 PROCEDURE — 97530 THERAPEUTIC ACTIVITIES: CPT | Performed by: OCCUPATIONAL THERAPIST

## 2020-06-03 PROCEDURE — 92507 TX SP LANG VOICE COMM INDIV: CPT | Performed by: SPEECH-LANGUAGE PATHOLOGIST

## 2020-06-03 NOTE — PROGRESS NOTES
"Outpatient Speech Language Pathology   Adult Speech Language Cognitive Treatment Note/Discharge Summary       Patient Name: Emmanuel Bermudez  : 1950  MRN: [id number]  Today's Date: 6/3/2020         Visit Date: 2020   Patient Active Problem List   Diagnosis   • Lewy body dementia without behavioral disturbance (CMS/HCC)          Visit Dx:    ICD-10-CM ICD-9-CM   1. Cognitive communication deficit R41.841 799.52   2. Dysarthria R47.1 784.51   3. Lewy body dementia without behavioral disturbance (CMS/HCC) G31.83 331.82    F02.80 294.10        Pain ratin- back pain  Subjective: Pt reports no new concerns. \"feeling good\". Discussed discharge from skilled services    LTGs   Pt will comprehend spoken information in all settings at 80% with cues   -Comprehension goals MET at 80%   Pt and family will implement compensatory strategies to maximize patient’s memory function so patient can continue to participate in daily activities at 80% with cues   -modeled some compensatory strategies for memory today for application at home, Discussed ways to incorporate strategies at home. Pt reports utilization at home. MET at 80%    STGs  Pt will improve comprehension of spoken language by following 1 step directions at 80% with cues   -80% today with no cues. MET    Pt will demonstrate comprehension of spoken language by answering simple general information questions at 80% with cues   -80% today with no cues with general information questions. MET   Pt will improve executive functioning skills by using planning strategies prior to beginning tasks at 80% with cues   -Modeled planning strategies throughout tasks. 70%. Goal d/c, continue to address with HEP  Pt will improve executive functioning skills by using self-monitoring strategies during functional tasks at 80% with cues   -Modeled self monitoring strategies today before/during/ after tasks. 75%. Self correction noted throughout tasks. Goal d/c, continue to address " with HEP  Pt will improve respiratory support and the use of respiration for speech through use of diaphragmatic breathing and prolonging phonation of a vowel sound at 80% with cues    - Modeled for pt and he was able to perform x20. MET at 80%   Pt will improve comprehensibility of verbal messages by speaking at an appropriate vocal intensity at 80% with cues   85% with minimal verbal prompts. MET, continue to address through HEP     Pt’s memory skills will be enhanced as reported by patient using external memory aides at 80% with cues   -Made a daily schedule together, modeled how to utilize at home. Sent home memory aid sheet/models. Pt reports 90% usage of journal at home. MET  Pts’s memory skills will be enhanced as reported by caregiver by using a memory book developed by SLP and family to help patient recall important personal information. at 80% with cues   -Pt does not want to initiate a memory book at this time. Goal d/c   Recertification: 8/12/2020        Discharge at this time. Most goals met. Some partially met.    OP SLP Education     Row Name 06/03/20 1300       Education    Barriers to Learning  No barriers identified  -RB    Education Provided  Patient demonstrated recommended strategies  -RB    Assessed  Learning needs;Learning motivation;Learning preferences;Learning readiness  -RB    Learning Motivation  Moderate  -RB    Learning Method  Explanation;Demonstration;Written materials  -RB    Teaching Response  Verbalized understanding;Demonstrated understanding;Reinforcement needed  -RB    Education Comments  extensive HEP sent home, strategies, reccomendations, etc  -RB      User Key  (r) = Recorded By, (t) = Taken By, (c) = Cosigned By    Initials Name Effective Dates    Julianna Hodges SLP 02/28/20 -           OP SLP Assessment/Plan - 06/03/20 1300        SLP Assessment    Clinical Impression Comments  Pt is able to utilize compensatory strategies independently after initial model. He is  implemeting strategies at home functionally. Pt feels confident with strategies. Pt and SLP discussed at length suspending therapy at this time in place of a HEP. Discharge at this time.    -RB       SLP Plan    Plan Comments  discharge to follow HEP at home and follow up as needed   -RB      User Key  (r) = Recorded By, (t) = Taken By, (c) = Cosigned By    Initials Name Provider Type    Julianna Hodges SLP Speech and Language Pathologist             SLP Outcome Measures (last 72 hours)      SLP Outcome Measures     Row Name 06/03/20 1300             SLP Outcome Measures    Outcome Measure Used?  Adult NOMS  -RB         Adult FCM Scores    FCM Chosen  Memory;Motor Speech  -RB      Motor Speech Score  4  -RB      Memory FCM Score  4  -RB        User Key  (r) = Recorded By, (t) = Taken By, (c) = Cosigned By    Initials Name Effective Dates    Julianna Hodges SLP 02/28/20 -               OP SLP Discharge Summary  Date of Discharge: 06/03/20  Reason for Discharge: identified goals partially met  Progress Toward Achieving Short/long Term Goals: goals partially met within established timelines(most goals met )  Discharge Destination: home w/ assist  Discharge Instructions: follow HEP       Julianna Klein MA CCC-SLP  6/3/2020

## 2020-06-03 NOTE — PROGRESS NOTES
"Occupational Therapy Daily Progress Note  Visit: 3  Date of Initial Visit: Type: THERAPY  Noted: 2020      Patient: Emmanuel Bermudez   : 1950  Diagnosis/ICD-10 Code:  Impaired mobility and ADLs [Z74.09]  Referring practitioner: Jarred Alejandro MD  Date of Initial Visit: Type: THERAPY  Noted: 2020  Today's Date: 6/3/2020  Patient seen for 3 sessions      Subjective:   Patient reports: \"I write in my journal daily and I can sometimes not even read my own writing\"  Pain: 0/10  Subjective Questionnaire: n/a  Clinical Progress: improved  Home Program Compliance: Yes  Treatment has included: therapeutic activity    Subjective   Objective     OT Neuro         OT Exercises     Row Name 20 1000             Exercise 1    Exercise Name 1  HW with focus on increasing size of letters and symmetry b/t letters and words to increase legibility and smoothness and avoid tremor  -ST         Exercise 2    Exercise Name 2  FMC activity involving in-hand manipulation performing don-buttoning R/L hands  -ST         Exercise 3    Exercise Name 3  normal buttoning however education to improve ease and independence by priming button hole  -ST        User Key  (r) = Recorded By, (t) = Taken By, (c) = Cosigned By    Initials Name Provider Type    Polina Garcia OTR Occupational Therapist           Assessment & Plan     Assessment  Assessment details: Pt with significant improvement in legibility in HW once pt increased size and focused on maintaining symmetry b/t letters. Notable improvement in tremor with profound improvement in smoothness of letter formation. Pt able to self-correct well to maintain larger sized letters. Progressed to numbers, phrases, simulated check and envelop writing. But demonstrates increased success with buttoning shirts once educated on priming button holes.    Plan  Plan details: Continue POC and goals as previously est to increase pt's satisfaction and independence with daily tasks. "         Visit Diagnoses:    ICD-10-CM ICD-9-CM   1. Impaired mobility and ADLs Z74.09 799.89   2. Decreased coordination R27.9 781.3   3. Decreased  strength R29.898 729.89             Timed:  Manual Therapy:    0     mins  86607;  Therapeutic Exercise:    0     mins  42389;     Neuromuscular Ian:    0    mins  39417;    Therapeutic Activity:     40     mins  99474;     Self-Care/ADL     0     mins  14021;   Sensory Int. Tech      0     mins 33215;  Ultrasound:     0     mins  97283;    Electrical Stimulation:    0     mins  84200 ( );    Untimed:  Electrical Stimulation:    0     mins  27909 ( );    Timed Treatment:   40   mins   Total Treatment:     40   mins    OT Signature: Polina Holbrook MS, OTR/L, CDP  [place] License #: [id number]

## 2020-06-05 ENCOUNTER — TREATMENT (OUTPATIENT)
Dept: PHYSICAL THERAPY | Facility: CLINIC | Age: 70
End: 2020-06-05

## 2020-06-05 DIAGNOSIS — M54.50 CHRONIC RIGHT-SIDED LOW BACK PAIN WITHOUT SCIATICA: Primary | ICD-10-CM

## 2020-06-05 DIAGNOSIS — R26.9 GAIT DISTURBANCE: ICD-10-CM

## 2020-06-05 DIAGNOSIS — G89.29 CHRONIC RIGHT-SIDED LOW BACK PAIN WITHOUT SCIATICA: Primary | ICD-10-CM

## 2020-06-05 PROCEDURE — 97140 MANUAL THERAPY 1/> REGIONS: CPT | Performed by: PHYSICAL THERAPIST

## 2020-06-05 PROCEDURE — 97110 THERAPEUTIC EXERCISES: CPT | Performed by: PHYSICAL THERAPIST

## 2020-06-05 NOTE — PROGRESS NOTES
Physical Therapy Daily Progress Note      Patient: Emmanuel Bermudez   : 1950  Referring practitioner: Jarred Alejandro MD  Date of Initial Visit: Type: THERAPY  Noted: 2020  Today's Date: 2020  Patient seen for 4 sessions           Subjective Evaluation    History of Present Illness  Mechanism of injury: The pt stated that his back felt much better yesterday morning, but increased later in the day. He has noticed this trend more recently and generally feels better early in the day. He has been compliant with his HEP and feels that stretches have been effective. He has been using heat to manage pain and likes this in the evening. He feels that manual therapy has helped.    Pain  Current pain ratin  Location: R sided LBP           Objective   See Exercise, Manual, and Modality Logs for complete treatment.       Assessment & Plan     Assessment  Assessment details: The pt feels that he is benefiting from stretches at home and MT in the clinic. He brought his medical records from his neurosurgeon in [place] which indicated that he had a R SIJ fusion on 19. A CT performed in December indicated that the joint was beginning to fuse but had not fully done so. 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 His last physician note was in 2020 and the surgeon at that time recommended a SIJ injection and was considering a possible surgical revision, L5 nerve block, and foraminotomy. He has not followed up with any surgeons in [place] since he moved a few months ago. Because his surgery was more recent than he had initially reported and because his surgeon was considering additional surgical intervention, I encouraged the pt to establish care with a PCP and a neurosurgeon. Treatment today included opening mobilization exercises for the R lumbar spine, stretches for the R lumbar and glute mm, and glute strengthening exercises. He tolerated all active treatment very well  and reported dec pain. MT was also repeated and directed at opening mobilization for the R lumbar facets as well as STM to the tight mm. He tolerated this much better than in previous visits though excessive mm tension remained.    Plan  Plan details: Continue opening mobs for the R sided lumbar mm. Exercises to strengthen the glutes.        Visit Diagnoses:    ICD-10-CM ICD-9-CM   1. Chronic right-sided low back pain without sciatica M54.5 724.2    G89.29 338.29   2. Gait disturbance R26.9 781.2       Progress per Plan of Care           Timed:  Manual Therapy:    15     mins  68160;  Therapeutic Exercise:    30     mins  24161;     Neuromuscular Ian:    0    mins  59872;    Therapeutic Activity:     0     mins  58099;     Gait Trainin     mins  57545;     Ultrasound:     0     mins  30603;    Electrical Stimulation:    0     mins  92876 ( );    Untimed:  Electrical Stimulation:    0     mins  30995 ( );  Mechanical Traction:    0     mins  33765;     Timed Treatment:   45   mins   Total Treatment:     58   mins  Pramod Dunlap PT  Physical Therapist

## 2020-06-10 ENCOUNTER — TREATMENT (OUTPATIENT)
Dept: PHYSICAL THERAPY | Facility: CLINIC | Age: 70
End: 2020-06-10

## 2020-06-10 DIAGNOSIS — R26.89 BALANCE PROBLEM: ICD-10-CM

## 2020-06-10 DIAGNOSIS — R26.9 GAIT DISTURBANCE: Primary | ICD-10-CM

## 2020-06-10 PROCEDURE — 97112 NEUROMUSCULAR REEDUCATION: CPT | Performed by: PHYSICAL THERAPIST

## 2020-06-10 PROCEDURE — 97110 THERAPEUTIC EXERCISES: CPT | Performed by: PHYSICAL THERAPIST

## 2020-06-10 NOTE — PROGRESS NOTES
"PT Re-Evaluation        Patient: Emmanuel Bermudez   : 1950  Diagnosis/ICD-10 Code:  Gait disturbance [R26.9]  Referring practitioner: Jarred Alejandro MD  Date of Initial Visit: Type: THERAPY  Noted: 2020  Today's Date: 6/10/2020  Patient seen for 5 sessions      Subjective:   Emmanuel Bermudez reports: \"I'm having trouble with my balance, R hip and dementia.  My R hip and back are feeling better since I've been seeing Pramod.\"  Subjective Questionnaire: n/a  Clinical Progress: improved R hip/back pain per pt report    Subjective Evaluation    Pain  Current pain ratin  Location: R hip         Objective          Ambulation     Ambulation: Stairs   Ascend stairs: contact guard assist  Pattern: reciprocal  Railings: one rail  Descend stairs: contact guard assist  Pattern: reciprocal  Railings: one rail    Observational Gait   Decreased walking speed, left step length and right step length.   Left arm swing: decreased  Right arm swing: decreased  Base of support: decreased    Additional Observational Gait Details  Decreased B trunk mobility/rotation; decreased B LE clearance with swing phase        PT Neuro         Assessment & Plan     Assessment  Impairments: abnormal gait, abnormal muscle firing, abnormal or restricted ROM, activity intolerance, impaired balance, impaired physical strength, lacks appropriate home exercise program and pain with function  Assessment details: Patient demonstrates impaired trunk/pelvic mobility, increased rigidity with all activities.  Pt has absent B arm swing, decreased B LE clearance with swing phase of gait.  Pt to benefit from neuro PT to improve gait, balance, strength and overall mobility.  Prognosis: good  Functional Limitations: carrying objects, lifting, walking, uncomfortable because of pain, stooping and unable to perform repetitive tasks  Goals  Plan Goals: Short Term Goals (4 weeks):     1. The patient will be independent and compliant with initial HEP.     2. The " patient will report pain at rest 0/10 or less and worst pain 4/10 or less.    3. The patient will display decreased TTP in the lumbar spine and dec mm tension in the surrounding musculature.    4. B hip strength will improve to 4+/5 in abd and ext.     5. JAGRUTI will improve by 6 points or more.     6. Distal symptoms will centralize above the knee.     7. The patient will perform 10 STS with appropriate knee position and no inc in pain.      Long Term Goals (8 weeks):     1. The patient will be appropriate for independent management and compliant with progressed HEP.     2. The patient will report pain at rest 0/10 or less and worst pain 2/10 or less.    3. The patient will return to work duties and/or ADLs with no limitations due to LBP.     4. The patient will return to recreational and community activities with no limitations due to LBP.     5. The patient will report no distal symptoms.   6.  Pt to improve FGA score to >/= 20/30 for improved functional mobility.  7.  Pt to improve mini-BEST score to >/= 22/28 for improved functional mobility.  8.  Pt to ambulate over 200' without scuffing heels with B swing phase and increased B arm swing for improved functional mobility.    Plan  Therapy options: will be seen for skilled physical therapy services  Planned modality interventions: cryotherapy, electrical stimulation/Russian stimulation, TENS, iontophoresis, thermotherapy (hydrocollator packs) and traction  Planned therapy interventions: abdominal trunk stabilization, manual therapy, motor coordination training, neuromuscular re-education, ADL retraining, body mechanics training, flexibility, functional ROM exercises, home exercise program, joint mobilization, postural training, soft tissue mobilization, spinal/joint mobilization, strengthening, stretching, therapeutic activities and balance/weight-bearing training  Frequency: 1x week  Duration in visits: 8  Duration in weeks: 8  Treatment plan discussed with:  patient  Plan details: Pt to benefit from skilled PT services to improve gait, balance, strength, and overall functional mobility.          Visit Diagnoses:    ICD-10-CM ICD-9-CM   1. Gait disturbance R26.9 781.2   2. Balance problem R26.89 781.99       Progress toward previous goals: n/a    New Goals  See new LTG's      Recommendations: Continue as planned  Timeframe: 6 weeks  Prognosis to achieve goals: good    PT Signature: Steph Joseph, PT  [place] License #: [id number]    Based upon review of the patient's progress and continued therapy plan, it is my medical opinion that Emmanuel Bermudez should continue physical therapy [facility]  [address] [facility] [address] [phone].    Signature: __________________________________  Jarred Alejandro MD    Timed:  Manual Therapy:    0     mins  37461;  Therapeutic Exercise:    10     mins  78642;     Neuromuscular Ian:    30    mins  63620;    Therapeutic Activity:     0     mins  87042;     Gait Trainin     mins  63841;     Ultrasound:     0     mins  30824;    Electrical Stimulation:    0     mins  19392 ( );    Untimed:  Electrical Stimulation:    0     mins  85011 ( );  Mechanical Traction:    0     mins  49717;     Timed Treatment:   40   mins   Total Treatment:     40   mins

## 2020-06-12 ENCOUNTER — TREATMENT (OUTPATIENT)
Dept: PHYSICAL THERAPY | Facility: CLINIC | Age: 70
End: 2020-06-12

## 2020-06-12 DIAGNOSIS — F02.80 LEWY BODY DEMENTIA WITHOUT BEHAVIORAL DISTURBANCE (HCC): Primary | ICD-10-CM

## 2020-06-12 DIAGNOSIS — M54.50 CHRONIC RIGHT-SIDED LOW BACK PAIN WITHOUT SCIATICA: Primary | ICD-10-CM

## 2020-06-12 DIAGNOSIS — Z74.09 IMPAIRED MOBILITY AND ADLS: ICD-10-CM

## 2020-06-12 DIAGNOSIS — G89.29 CHRONIC RIGHT-SIDED LOW BACK PAIN WITHOUT SCIATICA: Primary | ICD-10-CM

## 2020-06-12 DIAGNOSIS — Z78.9 IMPAIRED MOBILITY AND ADLS: ICD-10-CM

## 2020-06-12 DIAGNOSIS — G31.83 LEWY BODY DEMENTIA WITHOUT BEHAVIORAL DISTURBANCE (HCC): Primary | ICD-10-CM

## 2020-06-12 PROCEDURE — 97110 THERAPEUTIC EXERCISES: CPT | Performed by: PHYSICAL THERAPIST

## 2020-06-12 PROCEDURE — 97530 THERAPEUTIC ACTIVITIES: CPT | Performed by: OCCUPATIONAL THERAPIST

## 2020-06-12 PROCEDURE — 97140 MANUAL THERAPY 1/> REGIONS: CPT | Performed by: PHYSICAL THERAPIST

## 2020-06-12 NOTE — PROGRESS NOTES
"Occupational Therapy Daily Progress Note/D/C Note  Visit: 4  Date of Initial Visit: Type: THERAPY  Noted: 2020      Patient: Emmanuel Bermudez   : 1950  Diagnosis/ICD-10 Code:  Lewy body dementia without behavioral disturbance (CMS/HCC) [G31.83, F02.80]  Referring practitioner: Jarred Alejandro MD  Date of Initial Visit: Type: THERAPY  Noted: 2020  Today's Date: 2020  Patient seen for 4 sessions      Subjective:   Patient reports: \"I can tell my HW is getting better. It's really only a problem when I get to writing too fast\"   Pain: 0/10  Subjective Questionnaire: 9HPT R: 24.10 L: 30.21  Clinical Progress: improved  Home Program Compliance: Yes  Treatment has included: therapeutic activity    Subjective   Objective     OT Neuro         OT Exercises     Row Name 20 0903             Exercise 1    Exercise Name 1  pre-HW activity to increase ROM and flexibility and decrease stiffness  -ST         Exercise 2    Exercise Name 2  HW on lined paper with increasing legibility by increasing lettering size   -ST         Exercise 3    Exercise Name 3  use of button hook to increase speed and accuracy with progression of disease if needed; review of other modifications including magnetic button front shirts  -ST        User Key  (r) = Recorded By, (t) = Taken By, (c) = Cosigned By    Initials Name Provider Type    Polina Garcia OTR Occupational Therapist           Assessment & Plan     Assessment  Impairments: abnormal coordination, abnormal gait, activity intolerance, impaired balance, impaired physical strength, lacks appropriate home exercise program and pain with function  Assessment details: Pt partially met or met all goals and has been educated on all AE needed to improve dressing. Pt has trialed button hook and reacher for UB and LB dressing and educated on magnet button front shirts if needed. Pt also demonstrates good teach back with priming button holes and using finger tip to place " button in hole for increased speed and accuracy. Pt also with good self-correcting on HW with improving size and legibility/shape with less cuing required. Pt requests to be d/c'ed at this time and will continue to PT for balance and back pain management.  Prognosis: fair  Functional Limitations: lifting, walking, pushing, uncomfortable because of pain, moving in bed, standing and stooping  Goals  Plan Goals: OT neuro goals      Pt will score 23 seconds or less R & 31 seconds or less L on the 9HPT to demonstrate improved accuracy and speed with fine motor coordination by 8 wks; partially met      Pt will increase L  strength by 5 # by 8 wks to demonstrate improved strength and function for daily tasks; NOT MET    Pt will be independent with hand strengthening HEP to increase independence with ADL/IADL performance tasks by 4 wks; MET, using tputty at home    Pt will be independent with hand FMC HEP to increase independence with ADL/IADL performance tasks by 4 wks; MET-performing HW and pre-HW activities at home             Plan  Planned therapy interventions: ADL retraining, balance/weight-bearing training, body mechanics training, fine motor coordination training, flexibility, functional ROM exercises, home exercise program, IADL retraining, motor coordination training, neuromuscular re-education, strengthening, stretching, therapeutic activities and transfer training  Treatment plan discussed with: patient  Plan details: D/C skilled OT services with pt continuing to utilize AE as needed, modifications to home/activity/enviornment and using learned techniques for HW.        Visit Diagnoses:    ICD-10-CM ICD-9-CM   1. Lewy body dementia without behavioral disturbance (CMS/HCC) G31.83 331.82    F02.80 294.10   2. Impaired mobility and ADLs Z74.09 V49.89    Z78.9              Timed:  Manual Therapy:    0     mins  72217;  Therapeutic Exercise:    0     mins  80194;     Neuromuscular Ian:    0    mins  35267;     Therapeutic Activity:     25     mins  23623;     Self-Care/ADL     0     mins  36594;   Sensory Int. Tech      0     mins 01386;  Ultrasound:     0     mins  79664;    Electrical Stimulation:    0     mins  40390 ( );    Untimed:  Electrical Stimulation:    0     mins  54378 ( );    Timed Treatment:   25   mins   Total Treatment:     25   mins    OT Signature: Polina Holbrook MS, OTR/L, CDP  [place] License #: [id number]

## 2020-06-12 NOTE — PROGRESS NOTES
Re-Assessment / Re-Certification      Patient: Emmanuel Bermudez   : 1950  Diagnosis/ICD-10 Code:  Chronic right-sided low back pain without sciatica [M54.5, G89.29]  Referring practitioner: Jarred Alejandro MD  Date of Initial Visit: Type: THERAPY  Noted: 2020  Today's Date: 2020  Patient seen for 6 sessions      Subjective:     Subjective Questionnaire: Oswestry: 15/50  Clinical Progress: improved  Home Program Compliance: Yes  Treatment has included: therapeutic exercise, manual therapy, therapeutic activity, gait training and moist heat    Subjective Evaluation    History of Present Illness  Mechanism of injury: The pt stated that his back has been consistently improving since beginning therapy. He has noticed a reduction in overall pain this week and attributes it to HEP performance. He has been diligent with his HEP and has begun walking 15-20 minutes per day. He feels that his tolerance to activity and MT alike is improving.    Pain  Current pain ratin  Location: R sided LBP         Objective          Postural Observations    Additional Postural Observation Details  Flattened lower lumbar spine and severe FHP with inc thoracic kyphosis      Palpation   Left   Muscle spasm in the erector spinae, lumbar paraspinals and quadratus lumborum.   Tenderness of the erector spinae, lumbar paraspinals and quadratus lumborum.     Right   Muscle spasm in the erector spinae, lumbar paraspinals and quadratus lumborum. Tenderness of the erector spinae, lumbar paraspinals and quadratus lumborum.     Tenderness     Lumbar Spine  Tenderness in the facet joint (L3-5). No tenderness in the spinous process.     Left Hip   No tenderness in the PSIS.     Right Hip   No tenderness in the PSIS.     Neurological Testing     Sensation     Lumbar   Left   Intact: light touch    Right   Intact: light touch    Reflexes   Left   Patellar (L4): normal (2+)  Achilles (S1): normal (2+)    Right   Patellar (L4): brisk  (3+)  Achilles (S1): normal (2+)    Active Range of Motion     Additional Active Range of Motion Details  Lumbar flexion: 0 cm to the floor - with slight knee flexion  Lumbar extension: 50%  R Lateral flexion: 0 cm to KJL  L Lateral flexion: 0 cm to KJL  R Rotation: 46 deg  L Rotation: 55 deg      Strength/Myotome Testing     Left Hip   Planes of Motion   Flexion: 5  Extension: 4  Abduction: 4+    Right Hip   Planes of Motion   Flexion: 5  Extension: 4  Abduction: 4    Left Knee   Flexion: 5  Extension: 5    Right Knee   Flexion: 5  Extension: 5    Additional Strength Details  Mod bradykinesia      Tests     Lumbar     Left   Negative quadrant.     Right   Negative quadrant.     Left Pelvic Girdle/Sacrum   Negative: sacrum compression, sacral spring and thigh thrust.     Right Pelvic Girdle/Sacrum   Negative: sacrum compression, sacral spring and thigh thrust.     Ambulation     Comments   The pt ambulated with a crouched posture, dec toño, and reduced step length bilaterally.      Assessment & Plan     Assessment  Impairments: abnormal muscle firing, abnormal or restricted ROM, activity intolerance, impaired physical strength, lacks appropriate home exercise program and pain with function  Assessment details: The pt has responded fairly well to initial rehab interventions for chronic LBP S/P R SIJ fusion. He has ongoing mm tightness, soreness, and residual pain that I believe is an untreated response from surgery, but stretching and consistent exercise have led to a dec in these symptoms. He demonstrated improved hip strength in all planes today and his quad endurance is significantly improved. Tolerance to MT is improving and he has dec sensitivity to the surgical region although excessive mm tension remains. He has had a reduction in pain since beginning treatment and seems pleased with the results so far. I challenged him to perform higher reps of his glute and quad strengthening exercises and reviewed core  strengthening activities and stretches today.   Prognosis: good  Functional Limitations: carrying objects, lifting, walking, uncomfortable because of pain, stooping and unable to perform repetitive tasks  Goals  Plan Goals: Short Term Goals (4 weeks):     1. The patient will be independent and compliant with initial HEP. Met    2. The patient will report pain at rest 0/10 or less and worst pain 4/10 or less. Ongoing     3. The patient will display decreased TTP in the lumbar spine and dec mm tension in the surrounding musculature. Progression    4. B hip strength will improve to 4+/5 in abd and ext. Ongoing     5. JAGRUTI will improve by 6 points or more. Ongoing     6. Distal symptoms will centralize above the knee. Met    7. The patient will perform 10 STS with appropriate knee position and no inc in pain. Met      Long Term Goals (8 weeks):     1. The patient will be appropriate for independent management and compliant with progressed HEP. Ongoing     2. The patient will report pain at rest 0/10 or less and worst pain 2/10 or less. Ongoing     3. The patient will return to work duties and/or ADLs with no limitations due to LBP. Ongoing     4. The patient will return to recreational and community activities with no limitations due to LBP. Ongoing     5. The patient will report no distal symptoms. Ongoing       Plan  Therapy options: will be seen for skilled physical therapy services  Planned modality interventions: cryotherapy, electrical stimulation/Russian stimulation, TENS, iontophoresis, thermotherapy (hydrocollator packs) and traction  Planned therapy interventions: abdominal trunk stabilization, manual therapy, motor coordination training, neuromuscular re-education, ADL retraining, body mechanics training, flexibility, functional ROM exercises, home exercise program, joint mobilization, postural training, soft tissue mobilization, spinal/joint mobilization, strengthening, stretching and therapeutic  activities  Frequency: 1x week  Duration in visits: 8  Duration in weeks: 8  Treatment plan discussed with: patient  Plan details: Continue flexion based treatment for the lumbar spine.        Visit Diagnoses:    ICD-10-CM ICD-9-CM   1. Chronic right-sided low back pain without sciatica M54.5 724.2    G89.29 338.29       Progress toward previous goals: Partially Met    PT Signature: Pramod Dunlap PT      Based upon review of the patient's progress and continued therapy plan, it is my medical opinion that Emmanuel Bermudez should continue physical therapy treatment at [facility]  [address] [address] [phone].    Signature: __________________________________  Jarred Alejandro MD    Timed:  Manual Therapy:    10     mins  06975;  Therapeutic Exercise:    45     mins  93995;     Neuromuscular Ian:    0    mins  40408;    Therapeutic Activity:     0     mins  12831;     Gait Trainin     mins  09941;     Ultrasound:     0     mins  90409;    Electrical Stimulation:    0     mins  10168 ( );    Untimed:  Electrical Stimulation:    0     mins  73473 ( );  Mechanical Traction:    0     mins  12334;     Timed Treatment:   55   mins   Total Treatment:     67   mins

## 2020-06-15 ENCOUNTER — TREATMENT (OUTPATIENT)
Dept: PHYSICAL THERAPY | Facility: CLINIC | Age: 70
End: 2020-06-15

## 2020-06-15 DIAGNOSIS — M54.50 CHRONIC RIGHT-SIDED LOW BACK PAIN WITHOUT SCIATICA: Primary | ICD-10-CM

## 2020-06-15 DIAGNOSIS — G89.29 CHRONIC RIGHT-SIDED LOW BACK PAIN WITHOUT SCIATICA: Primary | ICD-10-CM

## 2020-06-15 PROCEDURE — 97140 MANUAL THERAPY 1/> REGIONS: CPT | Performed by: PHYSICAL THERAPIST

## 2020-06-15 PROCEDURE — 97110 THERAPEUTIC EXERCISES: CPT | Performed by: PHYSICAL THERAPIST

## 2020-06-15 NOTE — PROGRESS NOTES
Physical Therapy Daily Progress Note      Patient: Emmanuel Bermudez   : 1950  Referring practitioner: Jarred Alejandro MD  Date of Initial Visit: Type: THERAPY  Noted: 2020  Today's Date: 6/15/2020  Patient seen for 7 sessions           Subjective Evaluation    History of Present Illness  Mechanism of injury: The pt stated that his back was feeling good this morning and he feels he is improving. He has been compliant with his HEP and has no new concerns.     Pain  Current pain ratin  Location: R sided LBP           Objective   See Exercise, Manual, and Modality Logs for complete treatment.       Assessment & Plan     Assessment  Assessment details: The pt has responded very well to PT treatment thus far and has had consistently reduced LBP. He demonstrated improved endurance with LE strengthening exercises and tolerated high reps well. I attempted to progress some of his familiar core exercises in the clinic to sitting or standing positions but he did not tolerate R rotation very well and back pain was increased. MT was performed as usual following exercise and heat was applied immediately afterwards, resulting in return of back pain to baseline.    Plan  Plan details: Continue flexion based core exercises and strengthening activities for the LEs. MT and heat for pain relief.        Visit Diagnoses:    ICD-10-CM ICD-9-CM   1. Chronic right-sided low back pain without sciatica M54.5 724.2    G89.29 338.29       Progress per Plan of Care           Timed:  Manual Therapy:    10     mins  47888;  Therapeutic Exercise:    32     mins  36644;     Neuromuscular Ian:    0    mins  75318;    Therapeutic Activity:     0     mins  35904;     Gait Trainin     mins  56185;     Ultrasound:     0     mins  38734;    Electrical Stimulation:    0     mins  82210 ( );    Untimed:  Electrical Stimulation:    0     mins  70828 ( );  Mechanical Traction:    0     mins  68746;     Timed Treatment:    42   mins   Total Treatment:     54   mins  Pramod Dunlap, PT  Physical Therapist

## 2020-06-17 ENCOUNTER — TREATMENT (OUTPATIENT)
Dept: PHYSICAL THERAPY | Facility: CLINIC | Age: 70
End: 2020-06-17

## 2020-06-17 DIAGNOSIS — R26.9 GAIT DISTURBANCE: Primary | ICD-10-CM

## 2020-06-17 DIAGNOSIS — R26.89 BALANCE PROBLEM: ICD-10-CM

## 2020-06-17 PROCEDURE — 97110 THERAPEUTIC EXERCISES: CPT | Performed by: PHYSICAL THERAPIST

## 2020-06-17 PROCEDURE — 97116 GAIT TRAINING THERAPY: CPT | Performed by: PHYSICAL THERAPIST

## 2020-06-17 PROCEDURE — 97112 NEUROMUSCULAR REEDUCATION: CPT | Performed by: PHYSICAL THERAPIST

## 2020-06-17 NOTE — PROGRESS NOTES
"Physical Therapy Daily Progress Note  Visit: 8  Date of Initial Visit: Type: THERAPY  Noted: 2020    Emmanuel Bermudez reports: \"I was doing good with my back and now it hurts today.\"    Subjective Evaluation    Pain  Current pain ratin  Location: R back pain/superior hip         Objective   See Exercise, Manual, and Modality Logs for complete treatment.    Exercise 1  Exercise Name 1: NuStep B UE/LEs  Equipment/Resistance 1: level 6  Time: 8 min  Exercise 2  Exercise Name 2: Issued and performed seated large amplitude exercises, see for details  Equipment/Resistance 2: min A; mod VCing  Sets/Reps 2: 5  Exercise 3  Exercise Name 3: St balance on blue foam with alteranting tapping cone and then tapping two cones  Equipment/Resistance 3: blue foam; cones; min A  Sets/Reps 3: 10  Exercise 4  Exercise Name 4: Tall kneeling walking fwd/bwd  Equipment/Resistance 4: CGA; LOB 30% of the time  Exercise 5  Exercise Name 5: Prone press up into tall kneeling and large amplitude posture with B UEs  Equipment/Resistance 5: CGA  Sets/Reps 5: 5       PT Neuro          Assessment & Plan     Assessment  Assessment details: Pt requires min/mod A with standing balance activities with LOB up to 30% of the time.  Pt requires mod VCing to increase effort and perform activities with larger amplitude.  Pt demonstrates increased B arm swing with holding trekking poles with therapist assist but pt is unable to carry over without assist. Pt to benefit from skilled PT services.    Plan  Plan details: Pt to benefit from skilled PT services to improve gait, balance, strength, and overall functional mobility.          Manual Therapy:     0     mins  56616;  Therapeutic Exercise:     10     mins  94414;     Neuromuscular Ian:     20    mins  55243;    Therapeutic Activity:      0     mins  06266;     Gait Training:       10     mins  43294;     Ultrasound:      0     mins  83016;    Electrical Stimulation:     0     mins  45043 (MC " );  Dry Needling      0     mins self-pay  Traction      0     mins 80901  Canalith Repositioning    0     mins 89655      Timed Treatment:   40   mins   Total Treatment:     40   mins    Steph Joseph, PT  [place] License #: [id number]    Physical Therapist

## 2020-06-23 ENCOUNTER — TREATMENT (OUTPATIENT)
Dept: PHYSICAL THERAPY | Facility: CLINIC | Age: 70
End: 2020-06-23

## 2020-06-23 DIAGNOSIS — G89.29 CHRONIC RIGHT-SIDED LOW BACK PAIN WITHOUT SCIATICA: Primary | ICD-10-CM

## 2020-06-23 DIAGNOSIS — M54.50 CHRONIC RIGHT-SIDED LOW BACK PAIN WITHOUT SCIATICA: Primary | ICD-10-CM

## 2020-06-23 PROCEDURE — 97110 THERAPEUTIC EXERCISES: CPT | Performed by: PHYSICAL THERAPIST

## 2020-06-23 PROCEDURE — 97140 MANUAL THERAPY 1/> REGIONS: CPT | Performed by: PHYSICAL THERAPIST

## 2020-06-23 NOTE — PROGRESS NOTES
Physical Therapy Daily Progress Note      Patient: Emmanuel Bermudez   : 1950  Referring practitioner: Jarred Alejandro MD  Date of Initial Visit: Type: THERAPY  Noted: 2020  Today's Date: 2020  Patient seen for 9 sessions           Subjective Evaluation    History of Present Illness  Mechanism of injury: The pt stated that his back felt good for a few days following his last visit so he attempted to progress his HEP independently. As a result, he had a return of some soreness and pain back to baseline. He is also seeing PT for balance and bradykinesia and tried to progress those exercises as well. He feels that is where he likely overworked himself. When his back was feeling better he was able to play with his grandkids with minimal irritation. He feels that his exercises are helping.    Pain  Current pain ratin  Location: R sided LBP           Objective   See Exercise, Manual, and Modality Logs for complete treatment.       Assessment & Plan     Assessment  Assessment details: The pt continues to tolerate exercise well and demonstrated improved strength and endurance with core and LE strengthening activities. His HEP was progressed to include a lunge and sidestepping so that glute strengthening could be performed in functional WB positions rather than in supine. He continued to report discomfort with RR during trunk rot exercises but stated it was tolerable. MT was tolerated very well with minimal complaints of pain and I observed dec mm tension and improved spinal mobility today compared to previous visits.    Plan  Plan details: Continue strengthening of the glutes and core. MT for improved mobility and dec mm tension.        Visit Diagnoses:    ICD-10-CM ICD-9-CM   1. Chronic right-sided low back pain without sciatica M54.5 724.2    G89.29 338.29       Progress per Plan of Care           Timed:  Manual Therapy:    10     mins  21641;  Therapeutic Exercise:    35     mins  69750;      Neuromuscular Ian:    0    mins  47009;    Therapeutic Activity:     0     mins  75284;     Gait Trainin     mins  16875;     Ultrasound:     0     mins  38595;    Electrical Stimulation:    0     mins  91354 ( );    Untimed:  Electrical Stimulation:    0     mins  66374 ( );  Mechanical Traction:    0     mins  83845;     Timed Treatment:   45   mins   Total Treatment:     45   mins  Pramod Dunlap PT  Physical Therapist

## 2020-06-26 ENCOUNTER — TREATMENT (OUTPATIENT)
Dept: PHYSICAL THERAPY | Facility: CLINIC | Age: 70
End: 2020-06-26

## 2020-06-26 DIAGNOSIS — R26.9 GAIT DISTURBANCE: Primary | ICD-10-CM

## 2020-06-26 DIAGNOSIS — R26.89 BALANCE PROBLEM: ICD-10-CM

## 2020-06-26 PROCEDURE — 97110 THERAPEUTIC EXERCISES: CPT | Performed by: PHYSICAL THERAPIST

## 2020-06-26 PROCEDURE — 97112 NEUROMUSCULAR REEDUCATION: CPT | Performed by: PHYSICAL THERAPIST

## 2020-06-26 NOTE — PROGRESS NOTES
"Physical Therapy Daily Progress Note  Visit: 10  Date of Initial Visit: Type: THERAPY  Noted: 2020    Emmanuel Bermudez reports: \"I overdid it again this week and my back is sore    Subjective Evaluation    Pain  Current pain ratin  Location: R lower back           Objective   See Exercise, Manual, and Modality Logs for complete treatment.      Exercise 1  Exercise Name 1: NuStep B UE/LEs  Equipment/Resistance 1: level 6  Time: 8 min  Exercise 2  Exercise Name 2: PWR! with reaching towards the side with LE performing hip abduction  Equipment/Resistance 2: min A to max A with LOB 30% of the time  Sets/Reps 2: 10  Exercise 3  Exercise Name 3: St balance on rocker board R/L, ant/post with reaching single clip and placing overhead on tband  Equipment/Resistance 3: rocker board; clips; tband; min/mod A  Sets/Reps 3: 10  Exercise 4  Exercise Name 4: MH applied to low back in prone following therapy session to decreased pain       PT Neuro          Assessment & Plan     Assessment  Assessment details: Pt requires min/mod A with standing dynamic balance activities with LOB up to 40% of the time.  Pt has increased back pain today and was educated to f/u with a back specialist MD.  Pt to benefit from skilled PT services to improve functional mobility, balance, and decrease pain.    Plan  Plan details: Pt to benefit from skilled PT services to improve gait, balance, strength, and overall functional mobility.          Manual Therapy:     0     mins  60401;  Therapeutic Exercise:     10     mins  20184;     Neuromuscular Ian:     30    mins  29476;    Therapeutic Activity:      0     mins  01111;     Gait Trainin     mins  72325;     Ultrasound:      0     mins  31056;    Electrical Stimulation:     0     mins  50496 ( );  Dry Needling      0     mins self-pay  Traction      0     mins 28206  Canalith Repositioning    0     mins 56749      Timed Treatment:   40   mins   Total Treatment:     40   " lisset Joseph, PT  [place] License #: [id number]    Physical Therapist

## 2020-06-29 ENCOUNTER — TREATMENT (OUTPATIENT)
Dept: PHYSICAL THERAPY | Facility: CLINIC | Age: 70
End: 2020-06-29

## 2020-06-29 DIAGNOSIS — M54.50 CHRONIC RIGHT-SIDED LOW BACK PAIN WITHOUT SCIATICA: Primary | ICD-10-CM

## 2020-06-29 DIAGNOSIS — G89.29 CHRONIC RIGHT-SIDED LOW BACK PAIN WITHOUT SCIATICA: Primary | ICD-10-CM

## 2020-06-29 PROCEDURE — 97140 MANUAL THERAPY 1/> REGIONS: CPT | Performed by: PHYSICAL THERAPIST

## 2020-06-29 PROCEDURE — 97110 THERAPEUTIC EXERCISES: CPT | Performed by: PHYSICAL THERAPIST

## 2020-06-29 NOTE — PROGRESS NOTES
Physical Therapy Daily Progress Note      Patient: Emmanuel Bermudez   : 1950  Referring practitioner: Jarred Alejandro MD  Date of Initial Visit: Type: THERAPY  Noted: 2020  Today's Date: 2020  Patient seen for 11 sessions           Subjective Evaluation    History of Present Illness  Mechanism of injury: The pt stated that his back pain has remained slightly elevated this past week and fluctuates based on activity levels. He attempted to back off of his exercises this week due to pain but did not feel that it helped much. He is interested in establishing care with a neurosurgeon in [place] and also requested recommendations for a PCP.     Pain  Current pain ratin  Location: R sided LBP           Objective   See Exercise, Manual, and Modality Logs for complete treatment.       Assessment & Plan     Assessment  Assessment details: At request of the pt I called his wife and discussed establishing care with a PCP and neurosurgeon in [place]. His pain levels remained higher than usual today although he was eager to exercise and work on rehab activities. The majority of treatment focused on manual therapy to dec mm tension that was noted in the R sided lumbar paraspinals and QL. Following MT, the pt reported feeling much better. Quadruped exercises were attempted and were challenging due to dec UE strength. The positions themselves dec LBP and may be beneficial to add in the future. A quadruped prayer stretch was added for elective use.     Plan  Plan details: Continue lumbar flexion exercises and activities for core and glute strengthening.        Visit Diagnoses:    ICD-10-CM ICD-9-CM   1. Chronic right-sided low back pain without sciatica M54.5 724.2    G89.29 338.29       Progress per Plan of Care           Timed:  Manual Therapy:    25     mins  69037;  Therapeutic Exercise:    15     mins  03786;     Neuromuscular Ian:    0    mins  93920;    Therapeutic Activity:     0     mins  35552;      Gait Trainin     mins  04628;     Ultrasound:     0     mins  78853;    Electrical Stimulation:    0     mins  06612 ( );    Untimed:  Electrical Stimulation:    0     mins  61538 ( );  Mechanical Traction:    0     mins  28229;     Timed Treatment:   40   mins   Total Treatment:     50   mins  Pramod Dunlap PT  Physical Therapist

## 2020-06-30 ENCOUNTER — TREATMENT (OUTPATIENT)
Dept: PHYSICAL THERAPY | Facility: CLINIC | Age: 70
End: 2020-06-30

## 2020-06-30 DIAGNOSIS — R26.9 GAIT DISTURBANCE: ICD-10-CM

## 2020-06-30 DIAGNOSIS — R26.89 BALANCE PROBLEM: Primary | ICD-10-CM

## 2020-06-30 PROCEDURE — 97112 NEUROMUSCULAR REEDUCATION: CPT | Performed by: PHYSICAL THERAPIST

## 2020-06-30 PROCEDURE — 97116 GAIT TRAINING THERAPY: CPT | Performed by: PHYSICAL THERAPIST

## 2020-06-30 PROCEDURE — 97110 THERAPEUTIC EXERCISES: CPT | Performed by: PHYSICAL THERAPIST

## 2020-06-30 NOTE — PROGRESS NOTES
"Physical Therapy Daily Progress Note  Visit: 12  Date of Initial Visit: Type: THERAPY  Noted: 2020    Emmanuel Bermudez reports: \"My back is better today.  My balance is still bad though.\"    Subjective Evaluation    Pain  Current pain ratin  Location: back pain         Objective   See Exercise, Manual, and Modality Logs for complete treatment.    Exercise 1  Exercise Name 1: NuStep B UE/LEs  Equipment/Resistance 1: level 6  Time: 8 min  Exercise 2  Exercise Name 2: B fwd stepping up onto/off rounded side of BOSU without UE A; standing on rounded side of BOSU  Equipment/Resistance 2: BOSU; min/mod A  Sets/Reps 2: 10  Exercise 3  Exercise Name 3: Fwd, B sidestepping along beam; B full turns; holding tandem with LE behind stepping fwd/bwd and then repeat with tapping cone in front; standing sideways with tapping cone in front  Equipment/Resistance 3: balance beam; min/mod A  Sets/Reps 3: 10  Exercise 4  Exercise Name 4: Ambulation fwd/bwd, EC, B cervical rot/flex/ext  Equipment/Resistance 4: CGA/min A with LOB 30% of the time  Sets/Reps 4: 500'       PT Neuro          Assessment & Plan     Assessment  Assessment details: Pt requires min/mod A with standing dynamic balance activities with LOB up to 30% of the time.  Pt demonstrates decreased B step length, trunk rotation and B arm swing with ambulation.  Pt requires VCing to perform activities with increased effort.  Pt to benefit from skilled PT services to improve functional mobility.    Plan  Plan details: Pt to benefit from skilled PT services to improve gait, balance, strength, and overall functional mobility.          Manual Therapy:     0     mins  22521;  Therapeutic Exercise:     10     mins  15058;     Neuromuscular Ian:     20    mins  05777;    Therapeutic Activity:      0     mins  78118;     Gait Training:       10     mins  52071;     Ultrasound:      0     mins  09100;    Electrical Stimulation:     0     mins  33190 ( );  Dry Needling      " 0     mins self-pay  Traction      0     mins 96083  Canalith Repositioning    0     mins 86793      Timed Treatment:   40   mins   Total Treatment:     40   mins    Steph Joseph, PT  [place] License #: [id number]    Physical Therapist

## 2020-07-06 ENCOUNTER — TREATMENT (OUTPATIENT)
Dept: PHYSICAL THERAPY | Facility: CLINIC | Age: 70
End: 2020-07-06

## 2020-07-06 DIAGNOSIS — R29.898 DECREASED GRIP STRENGTH: Primary | ICD-10-CM

## 2020-07-06 PROCEDURE — 97110 THERAPEUTIC EXERCISES: CPT | Performed by: PHYSICAL THERAPIST

## 2020-07-06 PROCEDURE — 97140 MANUAL THERAPY 1/> REGIONS: CPT | Performed by: PHYSICAL THERAPIST

## 2020-07-06 PROCEDURE — G0283 ELEC STIM OTHER THAN WOUND: HCPCS | Performed by: PHYSICAL THERAPIST

## 2020-07-06 NOTE — PROGRESS NOTES
Physical Therapy Daily Progress Note      Patient: Emmanuel Bermudez   : 1950  Referring practitioner: Jarred Alejandro MD  Date of Initial Visit: Type: THERAPY  Noted: 2020  Today's Date: 2020  Patient seen for 13 sessions           Subjective Evaluation    History of Present Illness  Mechanism of injury: The pt stated that his back was not feeling well this morning, reporting pain as high as 8/10 with certain movements. He took medication appx 1 hour prior to PT and stated it had helped. Pain at rest was minimal but he stated that rotation was particularly uncomfortable. He stated that pain was on both sides of his spine today.    Pain  Current pain rating: 3  Location: LBP (predominantly R but noted on L as well)           Objective   See Exercise, Manual, and Modality Logs for complete treatment.       Assessment & Plan     Assessment  Assessment details: The pt presented to PT with inc pain levels compared to previous visits, although he was feeling better than usual at rest. He had a somewhat difficult time describing his symptoms but inc mm spasm seemed to be his biggest complaint. Therefore, treatment emphasized stretching, STM and jt mobs, heat, and IFC to reduce irritability. This was tolerated very well but the pt appeared uncomfortable when he left. Inc mm tension was noted on either side of the lumbar spine and relaxed somewhat with interventions, though seemed greater than usual even after treatment. I recommended that he focus on stretching for the next few days.    Plan  Plan details: Assess response to treatment and progress core strengthening exercises as tolerated.        Visit Diagnoses:    ICD-10-CM ICD-9-CM   1. Decreased  strength R29.898 729.89       Progress per Plan of Care           Timed:  Manual Therapy:    20     mins  81284;  Therapeutic Exercise:    10     mins  73021;     Neuromuscular Ian:    0    mins  60977;    Therapeutic Activity:     0     mins  26738;      Gait Trainin     mins  35772;     Ultrasound:     0     mins  81524;    Electrical Stimulation:    0     mins  29652 ( );    Untimed:  Electrical Stimulation:    15     mins  05006 ( );  Mechanical Traction:    0     mins  80948;     Timed Treatment:   30   mins   Total Treatment:     45   mins  Pramod Dunlap PT  Physical Therapist

## 2020-07-07 ENCOUNTER — TREATMENT (OUTPATIENT)
Dept: PHYSICAL THERAPY | Facility: CLINIC | Age: 70
End: 2020-07-07

## 2020-07-07 DIAGNOSIS — R26.9 GAIT DISTURBANCE: ICD-10-CM

## 2020-07-07 DIAGNOSIS — R26.89 BALANCE PROBLEM: Primary | ICD-10-CM

## 2020-07-07 PROCEDURE — 97116 GAIT TRAINING THERAPY: CPT | Performed by: PHYSICAL THERAPIST

## 2020-07-07 PROCEDURE — 97112 NEUROMUSCULAR REEDUCATION: CPT | Performed by: PHYSICAL THERAPIST

## 2020-07-07 PROCEDURE — 97110 THERAPEUTIC EXERCISES: CPT | Performed by: PHYSICAL THERAPIST

## 2020-07-07 NOTE — PROGRESS NOTES
"Physical Therapy Daily Progress Note  Visit: 14  Date of Initial Visit: Type: THERAPY  Noted: 2020    Emmanuel Bermudez reports: \"I'm feeling a little bit better today.\"    Subjective Evaluation    Pain  Current pain ratin  Location: back           Objective   See Exercise, Manual, and Modality Logs for complete treatment.    Exercise 1  Exercise Name 1: NuStep B UE/LEs  Equipment/Resistance 1: level 6  Time: 8 min  Exercise 2  Exercise Name 2: Issued and performed seated large amplitude exercise  Equipment/Resistance 2: min VCing to perform with more effort  Sets/Reps 2: 2  Time 2: 5  Exercise 3  Exercise Name 3: Sitting balance on large swiss ball with B LE marching, TKE, hip ab/adduction, EC with B cervical rot/flex/ext  Equipment/Resistance 3: large swiss ball; min A  Sets/Reps 3: 10  Exercise 4  Exercise Name 4: Ambulation on TM with B UE A  Equipment/Resistance 4: TM @ 1.8 mph; min VCing to increase B step length and perform heel strike  Time 4: 2 min       PT Neuro          Assessment & Plan     Assessment  Assessment details: Pt requires min VCing for seated HEP activity and pt required seated rest break secondary to fatigue.  Pt requires extra time to perform and VCing to perform with more effort.  Pt requires VCing to increase B step length and perform greater heel strike with ambulation on TM with B UE A.  Pt to benefit from skilled PT services to improve functional mobility.    Plan  Plan details: Pt to benefit from skilled PT services to improve gait, balance, strength, and overall functional mobility.          Manual Therapy:     0     mins  84656;  Therapeutic Exercise:     10     mins  21236;     Neuromuscular Ian:     20    mins  31411;    Therapeutic Activity:      0     mins  92285;     Gait Training:       10     mins  01796;     Ultrasound:      0     mins  63498;    Electrical Stimulation:     0     mins  86004 ( );  Dry Needling      0     mins self-pay  Traction      0     mins " 85770  Canalith Repositioning    0     mins 92441      Timed Treatment:   40   mins   Total Treatment:     40   mins    Steph Joseph, PT  [place] License #: [id number]    Physical Therapist

## 2020-07-13 ENCOUNTER — TELEPHONE (OUTPATIENT)
Dept: NEUROSURGERY | Facility: CLINIC | Age: 70
End: 2020-07-13

## 2020-07-14 ENCOUNTER — TREATMENT (OUTPATIENT)
Dept: PHYSICAL THERAPY | Facility: CLINIC | Age: 70
End: 2020-07-14

## 2020-07-14 DIAGNOSIS — R26.9 GAIT DISTURBANCE: ICD-10-CM

## 2020-07-14 DIAGNOSIS — R26.89 BALANCE PROBLEM: Primary | ICD-10-CM

## 2020-07-14 PROCEDURE — 97112 NEUROMUSCULAR REEDUCATION: CPT | Performed by: PHYSICAL THERAPIST

## 2020-07-14 PROCEDURE — 97110 THERAPEUTIC EXERCISES: CPT | Performed by: PHYSICAL THERAPIST

## 2020-07-14 NOTE — PROGRESS NOTES
"PT Re-Assessment / Re-Certification        Patient: Emmanuel Bermudez   : 1950  Diagnosis/ICD-10 Code:  Balance problem [R26.89]  Referring practitioner: Jarred Alejandro MD  Date of Initial Visit: Type: THERAPY  Noted: 2020  Today's Date: 2020  Patient seen for 15 sessions      Subjective:   Emmanuel Bermudez reports: \"I feel like my balance would be better if my back didn't hurt.\"  Subjective Questionnaire: n/a  Clinical Progress: improved  Home Program Compliance: Yes  Treatment has included: therapeutic exercise, neuromuscular re-education, manual therapy, gait training, moist heat and cryotherapy    Subjective Evaluation    Pain  Current pain ratin  Location: LBP         Objective     Exercise 1  Exercise Name 1: NuStep B UE/LEs  Equipment/Resistance 1: level 6  Time: 8 min  Exercise 2  Exercise Name 2: Re-assessment completed, see for details  Exercise 3  Exercise Name 3: ST balance with B fwd lunge to BOSU without UE A and added B shoulder flexion overhead on second set  Equipment/Resistance 3: BOSU; min A; LOB 30% of the time  Sets/Reps 3: 2  Time 3: 10  Exercise 4  Exercise Name 4: St balance on both sides of BOSU   Equipment/Resistance 4: BOSU; min A; LOB 40% of the time  Sets/Reps 4: 3 min on each side  Exercise 5  Exercise Name 5: St balance on blue foam with alteranting tapping cone to the side  Equipment/Resistance 5: blue foam; 2 cones; min/mod A; LOB up to 40% of the time  Sets/Reps 5: 10       PT Neuro         Assessment & Plan     Assessment  Impairments: abnormal gait, abnormal muscle firing, abnormal or restricted ROM, activity intolerance, impaired balance, impaired physical strength, lacks appropriate home exercise program and pain with function  Assessment details: Patient met goals for mini-BEST and FGA today.  Pt to continue with seeing orthopedic PT for back pain and discharge from neuro so pt can focus on back pain.  Pt requires min A with standing dynamic balance activities " but has to rest secondary to increased back pain.  Prognosis: good  Functional Limitations: carrying objects, lifting, walking, uncomfortable because of pain, stooping and unable to perform repetitive tasks  Goals  Plan Goals: Short Term Goals (4 weeks):     1. The patient will be independent and compliant with initial HEP. MET    2. The patient will report pain at rest 0/10 or less and worst pain 4/10 or less. ONGOING    3. The patient will display decreased TTP in the lumbar spine and dec mm tension in the surrounding musculature. MET    4. B hip strength will improve to 4+/5 in abd and ext. ONGOING    5. JAGRUTI will improve by 6 points or more. ONGOING    6. Distal symptoms will centralize above the knee. D/C    7. The patient will perform 10 STS with appropriate knee position and no inc in pain. MET      Long Term Goals (8 weeks):     1. The patient will be appropriate for independent management and compliant with progressed HEP. ONGOING    2. The patient will report pain at rest 0/10 or less and worst pain 2/10 or less. ONGOING    3. The patient will return to work duties and/or ADLs with no limitations due to LBP.  ONGOING    4. The patient will return to recreational and community activities with no limitations due to LBP. ONGOING  5. The patient will report no distal symptoms. ONGOING  6.  Pt to improve FGA score to >/= 20/30 for improved functional mobility. MET  7.  Pt to improve mini-BEST score to >/= 22/28 for improved functional mobility. MET  8.  Pt to ambulate over 200' without scuffing heels with B swing phase and increased B arm swing for improved functional mobility. ONGOING    Plan  Therapy options: will be seen for skilled physical therapy services  Planned modality interventions: cryotherapy, electrical stimulation/Russian stimulation, TENS, iontophoresis, thermotherapy (hydrocollator packs) and traction  Planned therapy interventions: abdominal trunk stabilization, manual therapy, motor  coordination training, neuromuscular re-education, ADL retraining, body mechanics training, flexibility, functional ROM exercises, home exercise program, joint mobilization, postural training, soft tissue mobilization, spinal/joint mobilization, strengthening, stretching, therapeutic activities and balance/weight-bearing training  Frequency: 1x week  Duration in visits: 5  Duration in weeks: 5  Treatment plan discussed with: patient  Plan details: Pt to benefit from skilled PT services to improve back pain and overall functional mobility.        Visit Diagnoses:    ICD-10-CM ICD-9-CM   1. Balance problem R26.89 781.99   2. Gait disturbance R26.9 781.2       Progress toward previous goals: Partially Met        Recommendations: Continue as planned  Timeframe: 5 visits  Prognosis to achieve goals: good    PT Signature: Steph Joseph, PT  [place] License #: [id number]    Based upon review of the patient's progress and continued therapy plan, it is my medical opinion that Emmanuel Bermudez should continue physical therapy treatment at [facility]  [facility] [phone].    Signature: __________________________________  Jarred Alejandro MD    Timed:  Manual Therapy:    0     mins  35578;  Therapeutic Exercise:    10     mins  37331;     Neuromuscular Ian:    30    mins  00877;    Therapeutic Activity:     0     mins  72885;     Gait Trainin     mins  69072;     Ultrasound:     0     mins  85906;    Electrical Stimulation:    0     mins  65230 ( );    Untimed:  Electrical Stimulation:    0     mins  24841 ( );  Mechanical Traction:    0     mins  44330;     Timed Treatment:   40   mins   Total Treatment:     40   mins

## 2020-07-30 ENCOUNTER — OFFICE VISIT (OUTPATIENT)
Dept: INTERNAL MEDICINE | Facility: CLINIC | Age: 70
End: 2020-07-30

## 2020-07-30 ENCOUNTER — TELEPHONE (OUTPATIENT)
Dept: NEUROLOGY | Facility: CLINIC | Age: 70
End: 2020-07-30

## 2020-07-30 VITALS
WEIGHT: 184 LBS | HEIGHT: 68 IN | SYSTOLIC BLOOD PRESSURE: 134 MMHG | TEMPERATURE: 97.9 F | HEART RATE: 82 BPM | DIASTOLIC BLOOD PRESSURE: 82 MMHG | BODY MASS INDEX: 27.89 KG/M2 | RESPIRATION RATE: 17 BRPM

## 2020-07-30 DIAGNOSIS — G31.83 LEWY BODY DEMENTIA WITHOUT BEHAVIORAL DISTURBANCE (HCC): Primary | ICD-10-CM

## 2020-07-30 DIAGNOSIS — E78.49 OTHER HYPERLIPIDEMIA: ICD-10-CM

## 2020-07-30 DIAGNOSIS — M54.50 CHRONIC BILATERAL LOW BACK PAIN WITHOUT SCIATICA: ICD-10-CM

## 2020-07-30 DIAGNOSIS — G89.29 CHRONIC BILATERAL LOW BACK PAIN WITHOUT SCIATICA: ICD-10-CM

## 2020-07-30 DIAGNOSIS — F02.80 LEWY BODY DEMENTIA WITHOUT BEHAVIORAL DISTURBANCE (HCC): Primary | ICD-10-CM

## 2020-07-30 DIAGNOSIS — E55.9 VITAMIN D DEFICIENCY, UNSPECIFIED: ICD-10-CM

## 2020-07-30 DIAGNOSIS — Z86.39 HISTORY OF VITAMIN D DEFICIENCY: ICD-10-CM

## 2020-07-30 DIAGNOSIS — Z12.5 SCREENING FOR MALIGNANT NEOPLASM OF PROSTATE: ICD-10-CM

## 2020-07-30 LAB
25(OH)D3 SERPL-MCNC: 21.4 NG/ML (ref 30–100)
ALBUMIN SERPL-MCNC: 4.4 G/DL (ref 3.5–5.2)
ALBUMIN/GLOB SERPL: 1.8 G/DL
ALP SERPL-CCNC: 83 U/L (ref 39–117)
ALT SERPL W P-5'-P-CCNC: 40 U/L (ref 1–41)
ANION GAP SERPL CALCULATED.3IONS-SCNC: 10.8 MMOL/L (ref 5–15)
ARTICHOKE IGE QN: 125 MG/DL (ref 0–100)
AST SERPL-CCNC: 46 U/L (ref 1–40)
BASOPHILS # BLD AUTO: 0.04 10*3/MM3 (ref 0–0.2)
BASOPHILS NFR BLD AUTO: 0.6 % (ref 0–1.5)
BILIRUB SERPL-MCNC: 0.2 MG/DL (ref 0–1.2)
BUN SERPL-MCNC: 18 MG/DL (ref 8–23)
BUN/CREAT SERPL: 18.6 (ref 7–25)
CALCIUM SPEC-SCNC: 10 MG/DL (ref 8.6–10.5)
CHLORIDE SERPL-SCNC: 103 MMOL/L (ref 98–107)
CHOLEST SERPL-MCNC: 308 MG/DL (ref 0–200)
CO2 SERPL-SCNC: 26.2 MMOL/L (ref 22–29)
CREAT SERPL-MCNC: 0.97 MG/DL (ref 0.76–1.27)
DEPRECATED RDW RBC AUTO: 44.7 FL (ref 37–54)
EOSINOPHIL # BLD AUTO: 0.06 10*3/MM3 (ref 0–0.4)
EOSINOPHIL NFR BLD AUTO: 0.9 % (ref 0.3–6.2)
ERYTHROCYTE [DISTWIDTH] IN BLOOD BY AUTOMATED COUNT: 13.1 % (ref 12.3–15.4)
GFR SERPL CREATININE-BSD FRML MDRD: 77 ML/MIN/1.73
GLOBULIN UR ELPH-MCNC: 2.4 GM/DL
GLUCOSE SERPL-MCNC: 90 MG/DL (ref 65–99)
HCT VFR BLD AUTO: 42.1 % (ref 37.5–51)
HDLC SERPL-MCNC: 37 MG/DL (ref 40–60)
HGB BLD-MCNC: 14.2 G/DL (ref 13–17.7)
IMM GRANULOCYTES # BLD AUTO: 0.01 10*3/MM3 (ref 0–0.05)
IMM GRANULOCYTES NFR BLD AUTO: 0.2 % (ref 0–0.5)
LDLC SERPL CALC-MCNC: ABNORMAL MG/DL
LDLC/HDLC SERPL: ABNORMAL {RATIO}
LYMPHOCYTES # BLD AUTO: 2.92 10*3/MM3 (ref 0.7–3.1)
LYMPHOCYTES NFR BLD AUTO: 43.8 % (ref 19.6–45.3)
MCH RBC QN AUTO: 31.7 PG (ref 26.6–33)
MCHC RBC AUTO-ENTMCNC: 33.7 G/DL (ref 31.5–35.7)
MCV RBC AUTO: 94 FL (ref 79–97)
MONOCYTES # BLD AUTO: 0.61 10*3/MM3 (ref 0.1–0.9)
MONOCYTES NFR BLD AUTO: 9.2 % (ref 5–12)
NEUTROPHILS NFR BLD AUTO: 3.02 10*3/MM3 (ref 1.7–7)
NEUTROPHILS NFR BLD AUTO: 45.3 % (ref 42.7–76)
NRBC BLD AUTO-RTO: 0 /100 WBC (ref 0–0.2)
PLATELET # BLD AUTO: 193 10*3/MM3 (ref 140–450)
PMV BLD AUTO: 12.1 FL (ref 6–12)
POTASSIUM SERPL-SCNC: 4.9 MMOL/L (ref 3.5–5.2)
PROT SERPL-MCNC: 6.8 G/DL (ref 6–8.5)
PSA SERPL-MCNC: 1.13 NG/ML (ref 0–4)
RBC # BLD AUTO: 4.48 10*6/MM3 (ref 4.14–5.8)
SODIUM SERPL-SCNC: 140 MMOL/L (ref 136–145)
TRIGL SERPL-MCNC: 464 MG/DL (ref 0–150)
VLDLC SERPL-MCNC: ABNORMAL MG/DL
WBC # BLD AUTO: 6.66 10*3/MM3 (ref 3.4–10.8)

## 2020-07-30 PROCEDURE — 85025 COMPLETE CBC W/AUTO DIFF WBC: CPT | Performed by: PHYSICIAN ASSISTANT

## 2020-07-30 PROCEDURE — 99204 OFFICE O/P NEW MOD 45 MIN: CPT | Performed by: PHYSICIAN ASSISTANT

## 2020-07-30 PROCEDURE — 80053 COMPREHEN METABOLIC PANEL: CPT | Performed by: PHYSICIAN ASSISTANT

## 2020-07-30 PROCEDURE — 82306 VITAMIN D 25 HYDROXY: CPT | Performed by: PHYSICIAN ASSISTANT

## 2020-07-30 PROCEDURE — 36415 COLL VENOUS BLD VENIPUNCTURE: CPT | Performed by: PHYSICIAN ASSISTANT

## 2020-07-30 PROCEDURE — 83721 ASSAY OF BLOOD LIPOPROTEIN: CPT | Performed by: PHYSICIAN ASSISTANT

## 2020-07-30 PROCEDURE — 80061 LIPID PANEL: CPT | Performed by: PHYSICIAN ASSISTANT

## 2020-07-30 PROCEDURE — G0103 PSA SCREENING: HCPCS | Performed by: PHYSICIAN ASSISTANT

## 2020-07-30 NOTE — TELEPHONE ENCOUNTER
Phone call with wife, Elva, to introduce myself and assess social support needs. I wanted to follow up on stopping of rehab services as well. She and her  moved from [place] to [place] to be closer to son and family. She said he is doing about the same, speech not improving, but still talking on phone, engaged and still drives, however this is limited because only drove to therapy but no longer attends. He established with a primary care office today, she said he stopped therapy as they were running out of medicare dollars and having back pain and wanted to reserve therapy visits if neurosurgeon thought a procedure and therapy were needed. She declined needed any community resources or support needs, agreed for me to mail her my business card for future care needs.

## 2020-08-05 DIAGNOSIS — E55.9 VITAMIN D DEFICIENCY: Primary | ICD-10-CM

## 2020-08-05 DIAGNOSIS — E78.49 OTHER HYPERLIPIDEMIA: ICD-10-CM

## 2020-08-05 RX ORDER — PRAVASTATIN SODIUM 20 MG
20 TABLET ORAL DAILY
Qty: 90 TABLET | Refills: 1 | Status: SHIPPED | OUTPATIENT
Start: 2020-08-05 | End: 2021-09-08

## 2020-08-07 ENCOUNTER — TELEPHONE (OUTPATIENT)
Dept: INTERNAL MEDICINE | Facility: CLINIC | Age: 70
End: 2020-08-07

## 2020-08-07 NOTE — TELEPHONE ENCOUNTER
Kirstin Bermudez [phone]  [facility] advising of clinical message listed below per MEI. Office number given for any questions or concerns, [phone].

## 2020-08-07 NOTE — TELEPHONE ENCOUNTER
Pt stated that The prescription for Vitamin D was only $5 otc and it was $30 prescribed. Medicare would not cover the prescribed vitamins. Pt would like to know if there's a difference and if he could buy the otc brand.     [place] call and advise     [phone]

## 2020-08-16 PROBLEM — G89.29 CHRONIC BILATERAL LOW BACK PAIN WITHOUT SCIATICA: Status: ACTIVE | Noted: 2020-08-16

## 2020-08-16 PROBLEM — M54.50 CHRONIC BILATERAL LOW BACK PAIN WITHOUT SCIATICA: Status: ACTIVE | Noted: 2020-08-16

## 2020-08-25 ENCOUNTER — DOCUMENTATION (OUTPATIENT)
Dept: PHYSICAL THERAPY | Facility: CLINIC | Age: 70
End: 2020-08-25

## 2020-08-25 DIAGNOSIS — G89.29 CHRONIC RIGHT-SIDED LOW BACK PAIN WITHOUT SCIATICA: Primary | ICD-10-CM

## 2020-08-25 DIAGNOSIS — M54.50 CHRONIC RIGHT-SIDED LOW BACK PAIN WITHOUT SCIATICA: Primary | ICD-10-CM

## 2020-08-25 NOTE — PROGRESS NOTES
Discharge Summary  Discharge Summary from Physical Therapy Report      Date of initial PT visit: 5/14/20    Number of Visits: 13     Goals: Partially Met    Discharge Plan: Continue with current home exercise program as instructed  Future need for rehabilitation activities    Comments: The pt was evaluated and treated by two different PTs for LBP and gait disturbances due to LBD. He was making fair progress with each but was depleting his allowed Medicare dollars for rehab. He chose to focus on LBP treatment exclusively and I ultimately recommended that he establish care with a neurosurgeon since he had a fairly recent surgery prior to moving to [place] and had not followed up. I explained this to his wife and recommended continued PT treatment but she cancelled the remainder of his visits and wanted to see the neurosurgeon first.     Date of Discharge: 8/25/20        Pramod Dunlap, PT  Physical Therapist

## 2020-09-20 ENCOUNTER — APPOINTMENT (OUTPATIENT)
Dept: PREADMISSION TESTING | Facility: HOSPITAL | Age: 70
End: 2020-09-20

## 2020-09-20 LAB — SARS-COV-2 RNA NOSE QL NAA+PROBE: NOT DETECTED

## 2020-09-20 PROCEDURE — U0004 COV-19 TEST NON-CDC HGH THRU: HCPCS

## 2020-09-20 PROCEDURE — C9803 HOPD COVID-19 SPEC COLLECT: HCPCS

## 2020-10-11 ENCOUNTER — APPOINTMENT (OUTPATIENT)
Dept: PREADMISSION TESTING | Facility: HOSPITAL | Age: 70
End: 2020-10-11

## 2020-10-11 LAB — SARS-COV-2 RNA RESP QL NAA+PROBE: NOT DETECTED

## 2020-10-11 PROCEDURE — C9803 HOPD COVID-19 SPEC COLLECT: HCPCS

## 2020-10-11 PROCEDURE — U0004 COV-19 TEST NON-CDC HGH THRU: HCPCS | Performed by: INTERNAL MEDICINE

## 2020-10-13 ENCOUNTER — TELEMEDICINE (OUTPATIENT)
Dept: INTERNAL MEDICINE | Facility: CLINIC | Age: 70
End: 2020-10-13

## 2020-10-13 ENCOUNTER — TELEPHONE (OUTPATIENT)
Dept: INTERNAL MEDICINE | Facility: CLINIC | Age: 70
End: 2020-10-13

## 2020-10-13 VITALS — TEMPERATURE: 98.4 F | HEART RATE: 78 BPM

## 2020-10-13 DIAGNOSIS — J40 BRONCHITIS: ICD-10-CM

## 2020-10-13 DIAGNOSIS — J06.9 ACUTE URI: Primary | ICD-10-CM

## 2020-10-13 PROCEDURE — 99213 OFFICE O/P EST LOW 20 MIN: CPT | Performed by: PHYSICIAN ASSISTANT

## 2020-10-13 RX ORDER — PREDNISONE 10 MG/1
TABLET ORAL
Qty: 14 TABLET | Refills: 0 | Status: SHIPPED | OUTPATIENT
Start: 2020-10-13 | End: 2020-10-19

## 2020-10-13 RX ORDER — BENZONATATE 100 MG/1
100 CAPSULE ORAL 3 TIMES DAILY PRN
Qty: 30 CAPSULE | Refills: 0 | Status: SHIPPED | OUTPATIENT
Start: 2020-10-13 | End: 2021-09-08

## 2020-10-13 RX ORDER — LORATADINE 10 MG/1
10 TABLET ORAL DAILY
Qty: 30 TABLET | Refills: 5 | Status: SHIPPED | OUTPATIENT
Start: 2020-10-13 | End: 2022-03-14

## 2020-10-13 NOTE — PROGRESS NOTES
Follow Up Office Visit      Chief Complaint:    Chief Complaint   Patient presents with   • Cough     Telemedicine was provided via two-way interactive audiovisual telecommunication (Doxy.me) due to the COVID-19 outbreak in order to minimize risk of exposure.  Others in attendance: Pt's wife.   Risks, benefits and alternative including in-person office visit have been explained to the patient, and the patient has consented to this mode of care.  The visit was conducted on a secure line.  All parties in the room, if any other, were identified and approved by the patient prior to the telemedicine visit.  No technical issues were experienced.  A level of care equivalent to in-person care was achieved. Please note this patient is new to me as PCP in the office, Rema Bishop was unable to see the patient.         History of Present Illness:   1. 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 Robitussin, mucinex tried to date.     Past Medical, Surgical and Social Histories, Problem List, Medications,Allergies and Care Team reviewed and updated as appropriate.    Review of Systems   Constitutional: Negative for fatigue and fever.   HENT: Positive for rhinorrhea. Negative for sore throat, swollen glands, trouble swallowing and voice change.    Eyes: Negative for visual disturbance.   Respiratory: Positive for cough and wheezing. Negative for shortness of breath.    Cardiovascular: Negative for chest pain and palpitations.   Gastrointestinal: Negative for abdominal pain, blood in stool, constipation, diarrhea and vomiting.        Today had looser stools but not promise diarrhea   Genitourinary: Negative for difficulty urinating, flank pain, frequency, hematuria and nocturia.   Neurological: Negative for dizziness, syncope, speech  difficulty and headache.       Vitals:    10/13/20 1648   Pulse: 78   Temp: 98.4 °F (36.9 °C)   TempSrc: Infrared        Physical Exam  Vitals signs reviewed.   Constitutional:       Comments: WNWD, WDWG, good eye contact. Speech articulate.    HENT:      Head: Normocephalic and atraumatic.   Eyes:      Conjunctiva/sclera: Conjunctivae normal.   Pulmonary:      Effort: Pulmonary effort is normal.   Skin:     Findings: No rash.   Neurological:      Mental Status: He is alert and oriented to person, place, and time.      Coordination: Coordination normal.      Gait: Gait normal.   Psychiatric:         Behavior: Behavior normal.         Thought Content: Thought content normal.         Judgment: Judgment normal.         Assessment/Plan:  Diagnoses and all orders for this visit:    1. Acute URI (Primary)  -     loratadine (Claritin) 10 MG tablet; Take 1 tablet by mouth Daily. For runny or congested nose  Dispense: 30 tablet; Refill: 5  -     benzonatate (Tessalon Perles) 100 MG capsule; Take 1 capsule by mouth 3 (Three) Times a Day As Needed for Cough.  Dispense: 30 capsule; Refill: 0  -     predniSONE (DELTASONE) 10 MG tablet; Take 2 tablets by mouth 2 (Two) Times a Day for 2 days, THEN 2 tablets Daily for 2 days, THEN 1 tablet Daily for 2 days.  Dispense: 14 tablet; Refill: 0    2. Bronchitis  -     loratadine (Claritin) 10 MG tablet; Take 1 tablet by mouth Daily. For runny or congested nose  Dispense: 30 tablet; Refill: 5  -     benzonatate (Tessalon Perles) 100 MG capsule; Take 1 capsule by mouth 3 (Three) Times a Day As Needed for Cough.  Dispense: 30 capsule; Refill: 0  -     predniSONE (DELTASONE) 10 MG tablet; Take 2 tablets by mouth 2 (Two) Times a Day for 2 days, THEN 2 tablets Daily for 2 days, THEN 1 tablet Daily for 2 days.  Dispense: 14 tablet; Refill: 0       Return if symptoms worsen or fail to improve with meds called in and symptom care.    Discussed options for and developed POC with pt,  risks/benefits, and all questions answered. Further workup and/or care could be done if symptoms persist, worsen or new symptoms develop.The patient will call in that eventuality.    MARIANA Escobar PA-C, PT  [facility]  Internal Medicine and Pediatrics- [place] [id number]    Please note that portions of this note may have been completed with a voice recognition program. Efforts were made to edit the dictations, but occasionally words are mistranscribed.

## 2020-10-13 NOTE — TELEPHONE ENCOUNTER
PTS WIFE CALLED REQUESTING AN APPOINTMENT FOR A COUGH  SHE STATED PT TESTED NEGATIVE FOR COVID    PTS WIFE DECLINED VIDEO VISIT    PLEASE ADVISE AT: [phone]

## 2020-11-02 ENCOUNTER — TELEPHONE (OUTPATIENT)
Dept: NEUROLOGY | Facility: CLINIC | Age: 70
End: 2020-11-02

## 2020-11-02 NOTE — TELEPHONE ENCOUNTER
PT'S WIFE ROSA MRAIA CALLED IN REQUESTING A REFERRAL BE PLACED FOR HER  TO THE [facility] SINCE  IS LEAVING AND  TO CALL [phone]. PLEASE ADVISE ONCE COMPLETE PER PT REQUEST      CALL BACK- [phone]

## 2020-11-03 DIAGNOSIS — G31.83 LEWY BODY DEMENTIA WITHOUT BEHAVIORAL DISTURBANCE (HCC): Primary | ICD-10-CM

## 2020-11-03 DIAGNOSIS — F02.80 LEWY BODY DEMENTIA WITHOUT BEHAVIORAL DISTURBANCE (HCC): Primary | ICD-10-CM

## 2020-11-06 ENCOUNTER — TREATMENT (OUTPATIENT)
Dept: PHYSICAL THERAPY | Facility: CLINIC | Age: 70
End: 2020-11-06

## 2020-11-06 DIAGNOSIS — R53.1 WEAKNESS: ICD-10-CM

## 2020-11-06 DIAGNOSIS — G89.29 CHRONIC RIGHT-SIDED LOW BACK PAIN WITHOUT SCIATICA: Primary | ICD-10-CM

## 2020-11-06 DIAGNOSIS — M54.50 CHRONIC RIGHT-SIDED LOW BACK PAIN WITHOUT SCIATICA: Primary | ICD-10-CM

## 2020-11-06 PROCEDURE — 97161 PT EVAL LOW COMPLEX 20 MIN: CPT | Performed by: PHYSICAL THERAPIST

## 2020-11-06 PROCEDURE — 97110 THERAPEUTIC EXERCISES: CPT | Performed by: PHYSICAL THERAPIST

## 2020-11-06 NOTE — PROGRESS NOTES
Physical Therapy Initial Evaluation and Plan of Care      Patient: Emmanuel Bermudez   : 1950  Diagnosis/ICD-10 Code:  Chronic right-sided low back pain without sciatica [M54.5, G89.29]  Referring practitioner: Jovon Mcmahon MD  Date of Initial Visit: 2020  Today's Date: 2020  Patient seen for 1 sessions           Subjective Questionnaire: Oswestry:       Subjective Evaluation    History of Present Illness  Mechanism of injury: The pt stated that he had been successfully managing his LBP with daily exercise from his HEP from his last course of therapy until about 2 weeks ago when he experienced an acute onset of severe L sided LBP and LE pain. The pt and his wife expressed that the pt was rendered unable to function due to pain and was taken to the ED where he had an injection. He feels this helped significantly and he reported he has more or less returned to baseline. His baseline pain is 3-4/10 but can increase with rotation and increased activity. His wife encourages him to walk but the pt stated he is afraid he will lose his balance and fall. He has tried to use a walking staff but does not feel comfortable with it. He reported feeling weak throughout his body and his wife noted he has been struggling to navigate the stairs and stand up and down from a chair. The pt is familiar to me and was treated for the same condition earlier in the year.    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)    Treatments  Previous treatment: physical therapy and injection treatment  Patient Goals  Patient goals for therapy:  decreased pain, increased strength, improved balance and independence with ADLs/IADLs             Objective          Static Posture     Comments  Swayback posture with a flattened lumbar lordosis and mild scoliosis    Strength/Myotome Testing     Left Shoulder     Planes of Motion   Flexion: 4   Abduction: 4   External rotation at 0°: 4-   Internal rotation at 0°: 4     Isolated Muscles   Biceps: 4+   Triceps: 4     Right Shoulder     Planes of Motion   Flexion: 4   Abduction: 4   External rotation at 0°: 4-   Internal rotation at 0°: 4     Isolated Muscles   Biceps: 4+   Triceps: 4     Left Hip   Planes of Motion   Flexion: 4  Extension: 4-  Abduction: 4  External rotation: 4    Right Hip   Planes of Motion   Flexion: 4  Extension: 4-  Abduction: 4  External rotation: 4    Left Knee   Flexion: 4+  Extension: 4+    Right Knee   Flexion: 4+  Extension: 4+    Ambulation     Comments   Pt ambulated with a slightly widened stance, minimal rotation of the trunk, absent arm swing, and inconsistent step length. No LOB.    Functional Assessment     Comments  STS performed slowly with use of the hands to shift the weight forward          Assessment & Plan     Assessment  Impairments: abnormal muscle firing, abnormal or restricted ROM, activity intolerance, impaired physical strength, lacks appropriate home exercise program and pain with function  Assessment details: The patient is a familiar 69 yo male who presented to PT with evolving characteristics of LBD and chronic LBP with moderate complexity. Signs and symptoms are consistent with non-specific chronic LBP and LBP with Parkinsonism. He became frustrated with his apparent physical regression in the last year and stated that he does not feel people acknowledge that his body is not normal due to the disease process. His wife was concerned about strength loss due to difficulty with stairs and STS transitions, but the challenging nature of these activities seems more  related to loss of power and rapid motion due to Parkinsonism than actual musculoskeletal strength. In fact, his strength appears to have improved since the last time I saw him and I am pleased to see that his LE mm are strong enough to support him in repetitive STS despite lack of momentum. He was encouraged to increase his activity level at home via frequent short bouts of walking and he was provided a pedometer to track his step count. The pt appears discouraged and he seems to think external expectations are too high, but I feel there is a middle ground of realistic improvement for increased activity and improved balance, gait, and strength that can be achieved with PT intervention.    Prognosis: good  Functional Limitations: carrying objects, lifting, walking, uncomfortable because of pain, stooping and unable to perform repetitive tasks  Goals  Plan Goals: Short Term Goals (4 weeks):     1. The patient will be independent and compliant with initial HEP.     2. The patient will report pain at rest 2/10 or less and worst pain 4/10 or less.    3. The pt will see an improvement in glute strength to 4/5 in abd, ext, and ER.    4. The pt will report 2-3 bouts of walking per day.    5. The pt will demonstrate appropriate gait patten with a SPC.    Long Term Goals:    1. The pt will demonstrate compliance with his HEP and the ability to manage independently.     2. The pt will ambulate for 15 minutes with no rest breaks of LOB.    3. The pt will perform 10 consecutive STS with no breaks or failed attempts.    Plan  Therapy options: will be seen for skilled physical therapy services  Planned modality interventions: cryotherapy, electrical stimulation/Russian stimulation, TENS, iontophoresis, thermotherapy (hydrocollator packs) and traction  Planned therapy interventions: abdominal trunk stabilization, manual therapy, motor coordination training, neuromuscular re-education, ADL retraining, body mechanics training,  flexibility, functional ROM exercises, home exercise program, joint mobilization, postural training, soft tissue mobilization, spinal/joint mobilization, strengthening, stretching and therapeutic activities  Frequency: 1x week  Duration in visits: 8  Duration in weeks: 8  Treatment plan discussed with: patient  Plan details: The patient will likely benefit from TE/NMED to include postural reeducation and core strengthening, MT for improved joint mobility, and TA for activity modification and lifting mechanics. Modalities will be utilized as needed for pain modulation.         Visit Diagnoses:    ICD-10-CM ICD-9-CM   1. Chronic right-sided low back pain without sciatica  M54.5 724.2    G89.29 338.29   2. Weakness  R53.1 780.79       Timed:  Manual Therapy:    0     mins  88137;  Therapeutic Exercise:    15     mins  62076;     Neuromuscular Ian:    0    mins  39962;    Therapeutic Activity:     0     mins  51752;     Gait Trainin     mins  56228;     Ultrasound:     0     mins  16315;    Electrical Stimulation:    0     mins  36922 ( );    Untimed:  Electrical Stimulation:    0     mins  82649 ( );  Mechanical Traction:    0     mins  67459;     Timed Treatment:   15   mins   Total Treatment:     50   mins    PT SIGNATURE: Pramod Dunlap PT   DATE TREATMENT INITIATED: 2020    Initial Certification  Certification Period: 2021  I certify that the therapy services are furnished while this patient is under my care.  The services outlined above are required by this patient, and will be reviewed every 90 days.     PHYSICIAN: Jovon Mcmahon MD      DATE:     Please sign and return via fax to [phone].. Thank you, [facility].

## 2020-11-16 ENCOUNTER — TREATMENT (OUTPATIENT)
Dept: PHYSICAL THERAPY | Facility: CLINIC | Age: 70
End: 2020-11-16

## 2020-11-16 DIAGNOSIS — R53.1 WEAKNESS: ICD-10-CM

## 2020-11-16 DIAGNOSIS — M54.50 CHRONIC RIGHT-SIDED LOW BACK PAIN WITHOUT SCIATICA: Primary | ICD-10-CM

## 2020-11-16 DIAGNOSIS — G89.29 CHRONIC RIGHT-SIDED LOW BACK PAIN WITHOUT SCIATICA: Primary | ICD-10-CM

## 2020-11-16 PROCEDURE — 97530 THERAPEUTIC ACTIVITIES: CPT | Performed by: PHYSICAL THERAPIST

## 2020-11-16 PROCEDURE — 97110 THERAPEUTIC EXERCISES: CPT | Performed by: PHYSICAL THERAPIST

## 2020-11-16 NOTE — PROGRESS NOTES
Physical Therapy Daily Progress Note      Patient: Emmanuel Bermudez   : 1950  Referring practitioner: Jovon Mcmahon MD  Date of Initial Visit: Type: THERAPY  Noted: 2020  Today's Date: 2020  Patient seen for 2 sessions           Subjective Evaluation    History of Present Illness  Mechanism of injury: The pt stated that he has not attempted any of his new HEP exercises but has continued to perform the same exercises he does every day. He has tried to monitor his steps with a pedometer but has not been able to get it to work. He continues to voice frustration with feeling slow and heavy and stated he does not feel as strong as he used to. His back pain has been largely unchanged despite stretching and exercise.    Pain  Current pain rating: 3  Location: LBP           Objective   See Exercise, Manual, and Modality Logs for complete treatment.       Assessment & Plan     Assessment  Assessment details: The pt has not attempted his HEP at home so it was performed in the clinic today so that he would be familiar with it. He was encouraged to perform his strengthening exercises fairly quickly to work on power, given that his Parkinsonism slows him down. Large motions for the LEs and UEs were emphasized during exercises and he did very good at the beginning of sets but movements were smaller as he became fatigued. His pedometer has not been registering his steps, likely because they are small, so he was shown how to utilize the pedometer on his phone instead in hopes it is a little more sensitive.     Plan  Plan details: Continue progressions of power-based exercises. Pt is to call about scheduling with our neuro PT to work on balance.        Visit Diagnoses:    ICD-10-CM ICD-9-CM   1. Chronic right-sided low back pain without sciatica  M54.5 724.2    G89.29 338.29   2. Weakness  R53.1 780.79       Progress per Plan of Care           Timed:  Manual Therapy:    0     mins  87216;  Therapeutic  Exercise:    30     mins  83605;     Neuromuscular Ian:    0    mins  72140;    Therapeutic Activity:     10     mins  46692;     Gait Trainin     mins  69665;     Ultrasound:     0     mins  08096;    Electrical Stimulation:    0     mins  27690 ( );    Untimed:  Electrical Stimulation:    0     mins  81871 ( );  Mechanical Traction:    0     mins  11244;     Timed Treatment:   40   mins   Total Treatment:     40   mins  Pramod Dunlap PT  Physical Therapist

## 2020-11-19 ENCOUNTER — TREATMENT (OUTPATIENT)
Dept: PHYSICAL THERAPY | Facility: CLINIC | Age: 70
End: 2020-11-19

## 2020-11-19 DIAGNOSIS — R26.9 GAIT DISTURBANCE: ICD-10-CM

## 2020-11-19 DIAGNOSIS — R26.89 BALANCE PROBLEM: Primary | ICD-10-CM

## 2020-11-19 DIAGNOSIS — R53.1 WEAKNESS: ICD-10-CM

## 2020-11-19 PROCEDURE — 97112 NEUROMUSCULAR REEDUCATION: CPT | Performed by: PHYSICAL THERAPIST

## 2020-11-19 PROCEDURE — 97110 THERAPEUTIC EXERCISES: CPT | Performed by: PHYSICAL THERAPIST

## 2020-11-19 NOTE — PROGRESS NOTES
"Physical Therapy Daily Progress Note  Visit: 3  Date of Initial Visit: Type: THERAPY  Noted: 2020    Emmanuel Bermudez reports: \"My back is doing alright today.  It's a little bit sore.  My balance has been so so.\"    Subjective Evaluation    Pain  Current pain ratin           Objective   See Exercise, Manual, and Modality Logs for complete treatment.    Exercise 1  Exercise Name 1: NuStep B UE/LEs  Equipment/Resistance 1: level 6  Time: 8 min  Exercise 2  Exercise Name 2: DLP  Equipment/Resistance 2: total gym  Sets/Reps 2: 2  Time 2: 2 min  Exercise 3  Exercise Name 3: B fwd lunge to BOSU with toss/catch ball on/off rebounder  Equipment/Resistance 3: BOSU; rebounder; 2# ball; min/modA  Sets/Reps 3: 10  Exercise 4  Exercise Name 4: B rotational hip chair and then reach overhead and across midline and hit wall  Equipment/Resistance 4: VCing; small ball  Sets/Reps 4: 5  Exercise 5  Exercise Name 5: B UE A on TRX with stepping up onto/off rounded side of BOSU  Equipment/Resistance 5: BOSU; TRX; min/mod A  Sets/Reps 5: 8 with L LE only with pt reporting increased back/hip pain  Exercise 6  Exercise Name 6: Pt performed sit to stand with emphasis on larger amplitude movements  Equipment/Resistance 6: see for details  Sets/Reps 6: 5  Exercise 6 Completed: Yes  Exercise 6 Home Program: Yes  Exercise 7  Exercise Name 7: Pt went over his morning routine and pt edcuated to perform B trunk rotation with B arms out to the side and then to perform bridge only to where hips allow a line from knees to shoulders       PT Neuro          Assessment & Plan     Assessment  Assessment details: Pt requires min/mod A with standing dynamic balance activities.  Pt performed a fwd PWR! Exercises with pt reporting increased discomfort in back/L hip.  Pt has increased discomfort with rotation.  Pt to benefit from skilled PT services for both back and balance/PWR! Activities.    Plan  Plan details: Pt to benefit from skilled PT services " to improve gait, balance, strength, decrease pain and improve overall functional mobility.          Manual Therapy:     0     mins  49490;  Therapeutic Exercise:     15     mins  48279;     Neuromuscular Ian:     30    mins  03155;    Therapeutic Activity:      0     mins  11694;     Gait Trainin     mins  36391;     Ultrasound:      0     mins  58572;    Electrical Stimulation:     0     mins  72104 ( );  Dry Needling      0     mins self-pay  Traction      0     mins 82469  Canalith Repositioning    0     mins 67778      Timed Treatment:   45   mins   Total Treatment:     45   mins    Steph Joseph, PT  [place] License #: [id number]    Physical Therapist

## 2020-11-23 ENCOUNTER — TREATMENT (OUTPATIENT)
Dept: PHYSICAL THERAPY | Facility: CLINIC | Age: 70
End: 2020-11-23

## 2020-11-23 DIAGNOSIS — R53.1 WEAKNESS: ICD-10-CM

## 2020-11-23 DIAGNOSIS — R26.9 GAIT DISTURBANCE: ICD-10-CM

## 2020-11-23 DIAGNOSIS — R26.89 BALANCE PROBLEM: Primary | ICD-10-CM

## 2020-11-23 PROCEDURE — 97530 THERAPEUTIC ACTIVITIES: CPT | Performed by: PHYSICAL THERAPIST

## 2020-11-23 PROCEDURE — 97110 THERAPEUTIC EXERCISES: CPT | Performed by: PHYSICAL THERAPIST

## 2020-11-23 NOTE — PROGRESS NOTES
Physical Therapy Daily Progress Note      Patient: Emmanuel Bermudez   : 1950  Referring practitioner: Jovon Mcmahon MD  Date of Initial Visit: Type: THERAPY  Noted: 2020  Today's Date: 2020  Patient seen for 4 sessions           Subjective Evaluation    History of Present Illness  Mechanism of injury: The pt stated that his back was feeling okay today and reported no large flare ups this week. He has not been performing his HEP exercises but has been doing his own daily exercise routine and also stated he has been trying to walk during commercial breaks. He monitored his step count this week and stated he gets between 2000 and 2500 steps per day. He requested that I re-print his exercises today.    Pain  Current pain ratin  Location: LBP           Objective   See Exercise, Manual, and Modality Logs for complete treatment.       Assessment & Plan     Assessment  Assessment details: Exercises emphasizing power and whole body motions were continued today in a similar fashion to his last visit and did not reproduce any pain in his low back. He has not been compliant with his prescribed exercises but has been staying active and performing his own exercise routines. He was educated on the importance of performing specific activities from his HEP to improve his specific deficits and his HEP was re-printed. He displayed very good power on the initial few reps of each exercise today but stamina was poor and resulted in a rapid reduction in force generation. He was encouraged to rest when fatigued so that each rep could be performed correctly. He was counseled on finding a movement disorder specialist and was given a recommendation and a number to call.     Plan  Plan details: Pt to continue exercise with emphasis on power and core strengthening.        Visit Diagnoses:    ICD-10-CM ICD-9-CM   1. Balance problem  R26.89 781.99   2. Weakness  R53.1 780.79   3. Gait disturbance  R26.9 781.2        Progress per Plan of Care           Timed:  Manual Therapy:    0     mins  73551;  Therapeutic Exercise:    22     mins  15073;     Neuromuscular Ian:    0    mins  47697;    Therapeutic Activity:     10     mins  89614;     Gait Trainin     mins  73201;     Ultrasound:     0     mins  41566;    Electrical Stimulation:    0     mins  84703 ( );    Untimed:  Electrical Stimulation:    0     mins  58083 ( );  Mechanical Traction:    0     mins  72065;     Timed Treatment:   32   mins   Total Treatment:     32   mins  Pramod Dunlap PT  Physical Therapist

## 2020-11-24 ENCOUNTER — TREATMENT (OUTPATIENT)
Dept: PHYSICAL THERAPY | Facility: CLINIC | Age: 70
End: 2020-11-24

## 2020-11-24 DIAGNOSIS — R53.1 WEAKNESS: ICD-10-CM

## 2020-11-24 DIAGNOSIS — R26.9 GAIT DISTURBANCE: ICD-10-CM

## 2020-11-24 DIAGNOSIS — R26.89 BALANCE PROBLEM: Primary | ICD-10-CM

## 2020-11-24 PROCEDURE — 97112 NEUROMUSCULAR REEDUCATION: CPT | Performed by: PHYSICAL THERAPIST

## 2020-11-24 PROCEDURE — 97110 THERAPEUTIC EXERCISES: CPT | Performed by: PHYSICAL THERAPIST

## 2020-11-24 NOTE — PROGRESS NOTES
"Physical Therapy Daily Progress Note  Visit: 5  Date of Initial Visit: Type: THERAPY  Noted: 2020    Emmanuel Bermudez reports: \"I need a list of all of the exercises that I need to doing so I can follow them.\"    Subjective     Objective   See Exercise, Manual, and Modality Logs for complete treatment.    Exercise 1  Exercise Name 1: NuStep B UE/LEs  Equipment/Resistance 1: level 6  Time: 10 min  Exercise 2  Exercise Name 2: Therapist went over patients list of exercises that he has been given in the past and organized into a folder.  Exercises numbered so patient can follow all of the exercises.  Equipment/Resistance 2: see for details  Exercise 2 Completed: Yes  Exercise 2 Home Program: Yes       PT Neuro          Assessment/Plan    Manual Therapy:     0     mins  42296;  Therapeutic Exercise:     10     mins  77399;     Neuromuscular Ian:     35    mins  50047;    Therapeutic Activity:      0     mins  80343;     Gait Trainin     mins  86795;     Ultrasound:      0     mins  71965;    Electrical Stimulation:     0     mins  82041 ( );  Dry Needling      0     mins self-pay  Traction      0     mins 92860  Canalith Repositioning    0     mins 39884      Timed Treatment:   45   mins   Total Treatment:     45   mins    Steph Joseph, PT  [place] License #: [id number]    Physical Therapist    "

## 2020-12-03 ENCOUNTER — TREATMENT (OUTPATIENT)
Dept: PHYSICAL THERAPY | Facility: CLINIC | Age: 70
End: 2020-12-03

## 2020-12-03 DIAGNOSIS — R26.89 BALANCE PROBLEM: Primary | ICD-10-CM

## 2020-12-03 DIAGNOSIS — R26.9 GAIT DISTURBANCE: ICD-10-CM

## 2020-12-03 PROCEDURE — 97110 THERAPEUTIC EXERCISES: CPT | Performed by: PHYSICAL THERAPIST

## 2020-12-03 PROCEDURE — 97116 GAIT TRAINING THERAPY: CPT | Performed by: PHYSICAL THERAPIST

## 2020-12-03 PROCEDURE — 97112 NEUROMUSCULAR REEDUCATION: CPT | Performed by: PHYSICAL THERAPIST

## 2020-12-03 NOTE — PROGRESS NOTES
"PT Re-Assessment / Re-Certification        Patient: Emmanuel Bermudez   : 1950  Diagnosis/ICD-10 Code:  Balance problem [R26.89]  Referring practitioner: Jarred Alejandro MD  Date of Initial Visit: Type: THERAPY  Noted: 2020  Today's Date: 12/3/2020  Patient seen for 6 sessions      Subjective:   Emmanuel Bermudez reports: \"I feel weak.\"  Subjective Questionnaire: n/a  Clinical Progress: improved  Home Program Compliance: Yes  Treatment has included: therapeutic exercise, neuromuscular re-education and gait training    Subjective   Objective     PT Neuro         Assessment & Plan     Assessment  Impairments: abnormal gait, abnormal muscle firing, abnormal or restricted ROM, activity intolerance, impaired balance, impaired physical strength, lacks appropriate home exercise program and pain with function  Assessment details: Pt met goals for HEP, walking and pain reduction.  Additional goals for MULLER and FGA set today.  Pt requires several seated rest breaks secondary to fatigue.  Pt demonstrates absent B arm swing with gait and decreased B step length.  Pt fearful of performing stairs without UE A and recip pattern. Pt is limited with performing B trunk rotation activities secondary to R back/hip pain. Pt to benefit from skilled PT services to meet goals and improve functional mobility.  Prognosis: good  Functional Limitations: carrying objects, lifting, walking, uncomfortable because of pain, stooping and unable to perform repetitive tasks  Goals  Plan Goals: Short Term Goals (4 weeks):     1. The patient will be independent and compliant with initial HEP. MET    2. The patient will report pain at rest 2/10 or less and worst pain 4/10 or less.  MET    3. The pt will see an improvement in glute strength to 4/5 in abd, ext, and ER. ONGOING    4. The pt will report 2-3 bouts of walking per day. MET    5. The pt will demonstrate appropriate gait patten with a SPC. ONGOING    Long Term Goals:    1. The pt will " demonstrate compliance with his HEP and the ability to manage independently.  ONGOING    2. The pt will ambulate for 15 minutes with no rest breaks of LOB. ONGOING    3. The pt will perform 10 consecutive STS with no breaks or failed attempts. ONGOING  4.  Pt to improve MULLER balance score to >/= 50/56 to improve functional mobility. NEW  5. Pt to improve FGA score to >/= 20/30 for improved balance with gait. NEW      Plan  Therapy options: will be seen for skilled physical therapy services  Planned modality interventions: cryotherapy, electrical stimulation/Russian stimulation, TENS, iontophoresis, thermotherapy (hydrocollator packs) and traction  Planned therapy interventions: abdominal trunk stabilization, manual therapy, motor coordination training, neuromuscular re-education, ADL retraining, body mechanics training, flexibility, functional ROM exercises, home exercise program, joint mobilization, postural training, soft tissue mobilization, spinal/joint mobilization, strengthening, stretching and therapeutic activities  Frequency: 1x week  Duration in visits: 6  Treatment plan discussed with: patient  Plan details: Pt to benefit from skilled PT services to improve gait, balance, strength, and overall functional mobility.          Visit Diagnoses:    ICD-10-CM ICD-9-CM   1. Balance problem  R26.89 781.99   2. Gait disturbance  R26.9 781.2       Progress toward previous goals: Partially Met    New Goals  Long-term goals (LTG): see added goals for MULLER and FGA      Recommendations: Continue as planned  Timeframe: 6 weeks  Prognosis to achieve goals: fair    PT Signature: Steph Joseph, PT  [place] License #: [id number]    Based upon review of the patient's progress and continued therapy plan, it is my medical opinion that Emmanuel Bermudez should continue physical therapy treatment at [facility]  [address] [facility] [address] [phone].    Signature:  __________________________________  Jarred Alejandro MD    Timed:  Manual Therapy:    0     mins  06416;  Therapeutic Exercise:    10     mins  39251;     Neuromuscular Ian:    20    mins  02823;    Therapeutic Activity:     0     mins  86050;     Gait Training:      15     mins  12582;     Ultrasound:     0     mins  67470;    Electrical Stimulation:    0     mins  57936 ( );    Untimed:  Electrical Stimulation:    0     mins  08607 ( );  Mechanical Traction:    0     mins  96416;     Timed Treatment:   45   mins   Total Treatment:     45   mins

## 2020-12-10 ENCOUNTER — TREATMENT (OUTPATIENT)
Dept: PHYSICAL THERAPY | Facility: CLINIC | Age: 70
End: 2020-12-10

## 2020-12-10 PROCEDURE — 97112 NEUROMUSCULAR REEDUCATION: CPT | Performed by: PHYSICAL THERAPIST

## 2020-12-10 PROCEDURE — 97110 THERAPEUTIC EXERCISES: CPT | Performed by: PHYSICAL THERAPIST

## 2020-12-10 NOTE — PROGRESS NOTES
"Physical Therapy Daily Progress Note  Visit: 7  Date of Initial Visit: Type: THERAPY  Noted: 2020    Emmanuel Bermudez reports: \"My pain is doing good today\"    Subjective Evaluation    Pain  Current pain ratin  Location: L hip           Objective   See Exercise, Manual, and Modality Logs for complete treatment.    Exercise 1  Exercise Name 1: NuStep B UE/LEs  Equipment/Resistance 1: level 6  Time: 10 min  Exercise 2  Exercise Name 2: DLP  Equipment/Resistance 2: total gym; emphasis on controlled lowering with explosive push into ext  Sets/Reps 2: 2  Time 2: 2 min  Exercise 3  Exercise Name 3: st balance on blue foam with one foot stepping onto step and other tapping cone raised up on box without UE A  Equipment/Resistance 3: blue foam; 8\" step; cone on box; min A  Sets/Reps 3: 10  Exercise 4  Exercise Name 4: Ambulation over 5 small hurdles with emphasis on clearing B LE with stepping over tara.  Pt catches tara 20% of the time secondary to decreased clearnce  Equipment/Resistance 4: 5 small hurdles; CGA  Sets/Reps 4: 6  Exercise 5  Exercise Name 5: B sidestepping with green tband with B shoulder abduction at the same time with emphasis on increased effort  Equipment/Resistance 5: green tband; CGA/VCing  Sets/Reps 5: length of // bars x 3               Assessment: Pt requires min A with SLS and balance with one LE on step and other tapping cone.  Pt has LOB up to 40% of the time.  Pt requires several seated rest breaks secondary to fatigue.  Pt to benefit from skilled PT services to meet goals.      Plan: Pt to benefit from skilled PT services 1x/wk for two additional visits to improve gait, balance, strength and overall functional mobility.        PT Neuro            Manual Therapy:     0     mins  24719;  Therapeutic Exercise:     30     mins  82167;     Neuromuscular Ian:     15    mins  65973;    Therapeutic Activity:      0     mins  43251;     Gait Trainin     mins  63276;   "   Ultrasound:      0     mins  03333;    Electrical Stimulation:     0     mins  11394 ( );  Dry Needling      0     mins self-pay  Traction      0     mins 66943  Canalith Repositioning    0     mins 95864      Timed Treatment:   45   mins   Total Treatment:     45   mins    Steph Joseph, PT  [place] License #: [id number]    Physical Therapist

## 2020-12-17 ENCOUNTER — TREATMENT (OUTPATIENT)
Dept: PHYSICAL THERAPY | Facility: CLINIC | Age: 70
End: 2020-12-17

## 2020-12-17 DIAGNOSIS — R26.9 GAIT DISTURBANCE: Primary | ICD-10-CM

## 2020-12-17 DIAGNOSIS — R26.89 BALANCE PROBLEM: ICD-10-CM

## 2020-12-17 PROCEDURE — 97110 THERAPEUTIC EXERCISES: CPT | Performed by: PHYSICAL THERAPIST

## 2020-12-17 PROCEDURE — 97116 GAIT TRAINING THERAPY: CPT | Performed by: PHYSICAL THERAPIST

## 2020-12-17 PROCEDURE — 97112 NEUROMUSCULAR REEDUCATION: CPT | Performed by: PHYSICAL THERAPIST

## 2020-12-17 NOTE — PROGRESS NOTES
"Physical Therapy Daily Progress Note  Visit: 8  Date of Initial Visit: Type: THERAPY  Noted: 2020    Emmanuel Bermudez reports: \"My back doesn't hurt anymore than usual today.  My balance is about the same.\"    Subjective Evaluation    Pain  Current pain ratin  Location: back pain           Objective   See Exercise, Manual, and Modality Logs for complete treatment.    Exercise 1  Exercise Name 1: NuStep B UE/LEs  Equipment/Resistance 1: level 6  Time: 8 min  Exercise 2  Exercise Name 2: Fwd and B sidestepping one foot in each square; fwd stepping with one foot in every other square; hold staggered with one foot in a square and toss/catch ball  Equipment/Resistance 2: agility ladder; ball; min A and VCing to increase step length/effort  Sets/Reps 2: 60 ft x 4 with fwd; 60 ft x 2 with sidestepping and staggered tos/catch  Exercise 3  Exercise Name 3: Quad opposite UEs with 3 sec hold  Sets/Reps 3: 5  Exercise 4  Exercise Name 4: Prone PWR! rotational reach on elbows; Supine PWR! B roational reach overhead   Sets/Reps 4: 10  Exercise 5  Exercise Name 5: Supine MH to low back per pt request  Time 5: 8 min       PT Neuro          Assessment & Plan     Assessment  Assessment details: Pt requires min A with agility ladder activities with VCing to increase effort with each step.  Pt has difficulty with any rotational activities secondary to back discomfort.  Pt to benefit from skilled PT services to improve functional mobility and balance.      Plan  Plan details: Pt to benefit from skilled PT services to improve gait, balance, strength, and overall functional mobility.          Manual Therapy:     0     mins  30833;  Therapeutic Exercise:     15     mins  25987;     Neuromuscular Ian:     15    mins  49325;    Therapeutic Activity:      0     mins  29677;     Gait Training:       10     mins  41187;     Ultrasound:      0     mins  60584;    Electrical Stimulation:     0     mins  82371 ( );  Dry Needling      " 0     mins self-pay  Traction      0     mins 82869  Canalith Repositioning    0     mins 80995      Timed Treatment:   40   mins   Total Treatment:     40   mins    Steph Joseph, PT  [place] License #: [id number]    Physical Therapist

## 2020-12-31 ENCOUNTER — TREATMENT (OUTPATIENT)
Dept: PHYSICAL THERAPY | Facility: CLINIC | Age: 70
End: 2020-12-31

## 2020-12-31 DIAGNOSIS — R26.9 GAIT DISTURBANCE: Primary | ICD-10-CM

## 2020-12-31 DIAGNOSIS — R26.89 BALANCE PROBLEM: ICD-10-CM

## 2020-12-31 PROCEDURE — 97116 GAIT TRAINING THERAPY: CPT | Performed by: PHYSICAL THERAPIST

## 2020-12-31 PROCEDURE — 97112 NEUROMUSCULAR REEDUCATION: CPT | Performed by: PHYSICAL THERAPIST

## 2020-12-31 PROCEDURE — 97110 THERAPEUTIC EXERCISES: CPT | Performed by: PHYSICAL THERAPIST

## 2020-12-31 NOTE — PROGRESS NOTES
"PT Re-Assessment / Re-Certification        Patient: Emmanuel Bermudez   : 1950  Diagnosis/ICD-10 Code:  Gait disturbance [R26.9]  Referring practitioner: Jarred Alejandro MD  Date of Initial Visit: Type: THERAPY  Noted: 2020  Today's Date: 2020  Patient seen for 9 sessions      Subjective:   Emmanuel Bermudez reports: \"My legs are weak.\"  Subjective Questionnaire: n/a  Clinical Progress: improved  Home Program Compliance: Yes  Treatment has included: therapeutic exercise, neuromuscular re-education, therapeutic activity and gait training    Subjective Evaluation    Pain  Current pain rating: 3  Location: R LBP/hip         Objective          Strength/Myotome Testing     Left Hip   Planes of Motion   Flexion: 4+  Extension: 4  Abduction: 4  Adduction: 4    Right Hip   Planes of Motion   Flexion: 4+  Extension: 4  Abduction: 4  Adduction: 4    Left Knee   Flexion: 4+  Extension: 4+    Right Knee   Flexion: 4+  Extension: 4+    Left Ankle/Foot   Dorsiflexion: 4  Plantar flexion: 4    Right Ankle/Foot   Dorsiflexion: 4  Plantar flexion: 4        PT Neuro         Assessment & Plan     Assessment  Impairments: abnormal gait, abnormal muscle firing, abnormal or restricted ROM, activity intolerance, impaired balance, impaired physical strength, lacks appropriate home exercise program and pain with function  Assessment details: Pt met all STGs with exception of ambulation with SC.  Pt improved MULLER balance and FGA score today.  Pt to benefit from skilled PT services through January to continue with functional gains/improvement.  Pt educated to call MD about getting a referral for his back pain.    Prognosis: fair  Functional Limitations: carrying objects, lifting, walking, uncomfortable because of pain, stooping and unable to perform repetitive tasks  Goals  Plan Goals: Short Term Goals (4 weeks):     1. The patient will be independent and compliant with initial HEP. MET    2. The patient will report pain at rest " 2/10 or less and worst pain 4/10 or less.  MET    3. The pt will see an improvement in glute strength to 4/5 in abd, ext, and ER. MET    4. The pt will report 2-3 bouts of walking per day. MET    5. The pt will demonstrate appropriate gait patten with a SPC. ONGOING    Long Term Goals:    1. The pt will demonstrate compliance with his HEP and the ability to manage independently.  ONGOING    2. The pt will ambulate for 15 minutes with no rest breaks of LOB. ONGOING    3. The pt will perform 10 consecutive STS with no breaks or failed attempts. ONGOING  4.  Pt to improve MULLER balance score to >/= 50/56 to improve functional mobility. ONGOING  5. Pt to improve FGA score to >/= 20/30 for improved balance with gait. ONGOING      Plan  Therapy options: will be seen for skilled physical therapy services  Planned modality interventions: cryotherapy, electrical stimulation/Russian stimulation, TENS, iontophoresis, thermotherapy (hydrocollator packs) and traction  Planned therapy interventions: abdominal trunk stabilization, manual therapy, motor coordination training, neuromuscular re-education, ADL retraining, body mechanics training, flexibility, functional ROM exercises, home exercise program, joint mobilization, postural training, soft tissue mobilization, spinal/joint mobilization, strengthening, stretching and therapeutic activities  Frequency: 1x week  Duration in visits: 4  Treatment plan discussed with: patient  Plan details: PT requesting 4 additional visits to meet remaining goals and improve functional mobility.        Visit Diagnoses:    ICD-10-CM ICD-9-CM   1. Gait disturbance  R26.9 781.2   2. Balance problem  R26.89 781.99       Progress toward previous goals: Partially Met    Recommendations: Continue with recommendations requesting 4 additional visits  Timeframe: 1 month  Prognosis to achieve goals: fair    PT Signature: Steph Joseph, PT  [place] License #: [id number]    Based upon review of the patient's  progress and continued therapy plan, it is my medical opinion that Emmanuel Bermudez should continue physical therapy treatment at [facility]  [address]   [place] [address] [phone].    Signature: __________________________________  Jarred Alejandro MD    Timed:  Manual Therapy:    0     mins  07000;  Therapeutic Exercise:    15     mins  54919;     Neuromuscular Ian:    15    mins  37511;    Therapeutic Activity:     0     mins  81541;     Gait Training:      15     mins  33983;     Ultrasound:     0     mins  57630;    Electrical Stimulation:    0     mins  14725 ( );    Untimed:  Electrical Stimulation:    0     mins  82408 ( );  Mechanical Traction:    0     mins  47222;     Timed Treatment:   45   mins   Total Treatment:     45   mins

## 2021-01-07 ENCOUNTER — TREATMENT (OUTPATIENT)
Dept: PHYSICAL THERAPY | Facility: CLINIC | Age: 71
End: 2021-01-07

## 2021-01-07 DIAGNOSIS — R26.9 GAIT DISTURBANCE: Primary | ICD-10-CM

## 2021-01-07 DIAGNOSIS — R26.89 BALANCE PROBLEM: ICD-10-CM

## 2021-01-07 PROCEDURE — 97112 NEUROMUSCULAR REEDUCATION: CPT | Performed by: PHYSICAL THERAPIST

## 2021-01-07 PROCEDURE — 97116 GAIT TRAINING THERAPY: CPT | Performed by: PHYSICAL THERAPIST

## 2021-01-07 PROCEDURE — 97110 THERAPEUTIC EXERCISES: CPT | Performed by: PHYSICAL THERAPIST

## 2021-01-07 NOTE — PROGRESS NOTES
"Physical Therapy Daily Progress Note  Visit: 10  Date of Initial Visit: Type: THERAPY  Noted: 11/6/2020    Emmanuel Bermudze reports: \"I'm about the same.\"    Subjective Evaluation    Pain  No pain reported           Objective   See Exercise, Manual, and Modality Logs for complete treatment.    Exercise 1  Exercise Name 1: NuStep B UE/LEs  Equipment/Resistance 1: level 6  Time: 8 min  Exercise 2  Exercise Name 2: St balance on trampoline with B UE A jumping, B ski jump, B hip ab/adduction jump, B LE marching and touching knee to bar in front; without UE A B full turns  Equipment/Resistance 2: trampoline; min A; VCing to increase effort  Sets/Reps 2: 10  Exercise 3  Exercise Name 3: Ambulation on TM with B UE A  Equipment/Resistance 3: @1.5 mph; 0%incline  Time 3: 3 min, 4 min  Exercise 4  Exercise Name 4: Ambulation with emphasis on increased B step length with heel strike and B arm swing following TM training  Equipment/Resistance 4: VCing to keep head lifted and increase B step length  Sets/Reps 4: 500'         PT Neuro          Assessment & Plan     Assessment  Assessment details: Pt requires min A with standing dynamic balance activities with LOB up to 40% of the time.  Pt demonstrates improved gait following TM training.  Pt requires several seated quick rest breaks secondary to fatigue.  Pt to benefit from skilled PT services to improve functional mobility.     Plan  Plan details: Pt to benefit from skilled PT services to improve gait, balance, strength, and overall functional mobility.          Manual Therapy:     0     mins  54466;  Therapeutic Exercise:     10     mins  04549;     Neuromuscular Ian:     20    mins  39054;    Therapeutic Activity:      0     mins  41092;     Gait Training:       15     mins  74983;     Ultrasound:      0     mins  66974;    Electrical Stimulation:     0     mins  60116 ( );  Dry Needling      0     mins self-pay  Traction      0     mins 80269  Canalith Repositioning    " 0     mins 52886      Timed Treatment:   45   mins   Total Treatment:     45   mins    Steph Joseph, PT  [place] License #: [id number]    Physical Therapist

## 2021-01-14 ENCOUNTER — TREATMENT (OUTPATIENT)
Dept: PHYSICAL THERAPY | Facility: CLINIC | Age: 71
End: 2021-01-14

## 2021-01-14 DIAGNOSIS — R26.89 BALANCE PROBLEM: ICD-10-CM

## 2021-01-14 DIAGNOSIS — R26.9 GAIT DISTURBANCE: Primary | ICD-10-CM

## 2021-01-14 PROCEDURE — 97110 THERAPEUTIC EXERCISES: CPT | Performed by: PHYSICAL THERAPIST

## 2021-01-14 PROCEDURE — 97112 NEUROMUSCULAR REEDUCATION: CPT | Performed by: PHYSICAL THERAPIST

## 2021-01-14 PROCEDURE — 97116 GAIT TRAINING THERAPY: CPT | Performed by: PHYSICAL THERAPIST

## 2021-01-14 NOTE — PROGRESS NOTES
"Physical Therapy Daily Progress Note  Visit: 11  Date of Initial Visit: Type: THERAPY  Noted: 2020    Emmanuel Bermudez reports: \"My back is better.  My balance is about the same.\"    Subjective Evaluation    Pain  Current pain ratin  Location: low back; during tx session           Objective   See Exercise, Manual, and Modality Logs for complete treatment.    Exercise 1  Exercise Name 1: NuStep B UE/LEs  Equipment/Resistance 1: level 6  Time: 8 min  Exercise 2  Exercise Name 2: B fwd, side, bwd lunge to rounded side of BOSU with B UE A on TRX  Equipment/Resistance 2: BOSU; TRX; min A  Sets/Reps 2: 10  Exercise 3  Exercise Name 3: ST balance on round side of BOSU with B UE A on TRX with B shoulder ab/adduction, alternating punching  Equipment/Resistance 3: BOSU; TRX;min/mod A  Sets/Reps 3: 10  Exercise 4  Exercise Name 4: Fwd/B sidestepping along balance beam; hold staggered with LE behind tapping cone in front; B full turns standing on beam  Equipment/Resistance 4: balance beam; min A  Sets/Reps 4: 2  Exercise 5  Exercise Name 5: Ambulation on TM with B UE A  Equipment/Resistance 5: TM @ 2.0 mph for 1 minute and then 1.5 mph for 2 min with VCing to increase B step length and perform heel strike  Sets/Reps 5: 3 min  Exercise 6  Exercise Name 6: MH to low back in prone  Time 6: 7 mn         PT Neuro          Assessment & Plan     Assessment  Assessment details: Pt requires min/mod A with standing balance on uneven surfaces with LOB up to 40% of the time.  Pt requires VCing to increase B step length and perform B heel strike with more upright trunk with ambulation on TM.  Pt to benefit from skilled PT services to improve functional mobility.  Barriers to therapy: back pain    Plan  Plan details: Pt to benefit from skilled PT services to improve gait, balance, strength, and overall functional mobility.          Manual Therapy:     0     mins  03627;  Therapeutic Exercise:     15     mins  37631;     Neuromuscular " Ian:     20    mins  37697;    Therapeutic Activity:      0     mins  31864;     Gait Training:       10     mins  13005;     Ultrasound:      0     mins  91442;    Electrical Stimulation:     0     mins  22970 ( );  Dry Needling      0     mins self-pay  Traction      0     mins 33140  Canalith Repositioning    0     mins 62451      Timed Treatment:   45   mins   Total Treatment:     45   mins    Steph Joseph, PT  [place] License #: [id number]    Physical Therapist

## 2021-01-21 ENCOUNTER — TREATMENT (OUTPATIENT)
Dept: PHYSICAL THERAPY | Facility: CLINIC | Age: 71
End: 2021-01-21

## 2021-01-21 DIAGNOSIS — R26.9 GAIT DISTURBANCE: Primary | ICD-10-CM

## 2021-01-21 DIAGNOSIS — R26.89 BALANCE PROBLEM: ICD-10-CM

## 2021-01-21 PROCEDURE — 97110 THERAPEUTIC EXERCISES: CPT | Performed by: PHYSICAL THERAPIST

## 2021-01-21 PROCEDURE — 97112 NEUROMUSCULAR REEDUCATION: CPT | Performed by: PHYSICAL THERAPIST

## 2021-01-21 NOTE — PROGRESS NOTES
"Physical Therapy Daily Progress Note  Visit: 12  Date of Initial Visit: Type: THERAPY  Noted: 2020    Emmanuel Bermudez reports: \"I went to neurologist and they added a medication for shuffling of my feet.  I started it yesterday.  He's scheduling a ALISA scan for me as well.\"    Subjective Evaluation    Pain  Current pain ratin  Location: R low back/hip pain         Objective   See Exercise, Manual, and Modality Logs for complete treatment.    Exercise 1  Exercise Name 1: NuStep B UE/LEs  Equipment/Resistance 1: level 6  Time: 8 min  Exercise 2  Exercise Name 2: Seated large amplitude first exercise with VCing to perform with more effort; B fwd, sidestepping and bwd stepping over half foam roll; rock and reach with B UE's moving togther large amplitude exercises  Equipment/Resistance 2: min A; half foam roll  Sets/Reps 2: 10         PT Neuro          Assessment & Plan     Assessment  Assessment details: Pt requires min A with both seated and standing large amplitude exercises with VCing to increase more effort.  Pt to benefit from skilled PT services to improve functional mobility to ensure HEP for one additional visit and then D/C.  Pt to take a break from therapy services until after he has his ALISA scan and meds are changed by MD.    Plan  Plan details: Pt to benefit from skilled PT services to improve gait, balance, strength, and overall functional mobility for one additional visit to ensure HEP.          Manual Therapy:     0     mins  36006;  Therapeutic Exercise:     10     mins  46966;     Neuromuscular Ian:     35    mins  07203;    Therapeutic Activity:      0     mins  26235;     Gait Trainin     mins  84508;     Ultrasound:      0     mins  68587;    Electrical Stimulation:     0     mins  88288 ( );  Dry Needling      0     mins self-pay  Traction      0     mins 08870  Canalith Repositioning    0     mins 20872      Timed Treatment:   45   mins   Total Treatment:     45   " lisset Joseph, PT  [place] License #: [id number]    Physical Therapist

## 2021-01-25 ENCOUNTER — TELEPHONE (OUTPATIENT)
Dept: NEUROLOGY | Facility: CLINIC | Age: 71
End: 2021-01-25

## 2021-01-25 NOTE — TELEPHONE ENCOUNTER
Provider: DR. FRANCE  Caller: EDWARD VASQUEZ  Relationship to Patient: PT'S WIFE    Reason for Call: PT'S WIFE ROSA MARIA IS CALLING STATING THAT DR. FRANCE HAD REFERRED THE PT TO  FOR [facility] . THE PT'S WIFE IS STATING THAT SHE HAD BRUNG THE PT'S CHART/DISC OF MRI FROM THE [facility] AND THEY ARE NEEDING THOSE RECORDS SENT TO DR. FELDER AT . SHE WAS ASKING IF SHE CAN COME AND PICK THEM UP OR CAN THEY BE MAILED TO HER.     .    When was the patient last seen: 11-      PLEASE CALL HER BACK [phone]

## 2021-01-25 NOTE — TELEPHONE ENCOUNTER
Patient was seen only once. 11/11/2020 appointment was canceled and never rescheduled. We can give patient copy of that one visit. As far as imaging, unfortunately we do not have that disc and they will need to contact that facility for disc.

## 2021-01-25 NOTE — TELEPHONE ENCOUNTER
Call placed to further discuss post-op DC plan  Pt reporting now is not a good time and reports she will call me back later        I spoke with pt's Disability care manager, LVM informing the patient's wife we can what we have to [facility], but they will need to request imaging from [facility].

## 2021-01-26 ENCOUNTER — DOCUMENTATION (OUTPATIENT)
Dept: PHYSICAL THERAPY | Facility: CLINIC | Age: 71
End: 2021-01-26

## 2021-01-26 NOTE — PROGRESS NOTES
Discharge Summary  Discharge Summary from Physical Therapy Report      Dates  PT visit: n/a  Number of Visits: 12     Discharge Status of Patient: See MD Note dated n/a    Goals: Partially Met    Discharge Plan: Patient to return to referring/providing physician    Comments Pt has had a COVID exposure.  Spoke with patients wife and educated about discharge from PT services and continue later on in the year as needed.      Date of Discharge 1/26/21        Steph Joseph, PT  Physical Therapist

## 2021-02-02 DIAGNOSIS — M47.818 ARTHRITIS OF SACROILIAC JOINT OF BOTH SIDES: Primary | ICD-10-CM

## 2021-02-02 RX ORDER — MELOXICAM 15 MG/1
15 TABLET ORAL DAILY
Qty: 90 TABLET | Refills: 1 | Status: SHIPPED | OUTPATIENT
Start: 2021-02-02 | End: 2021-09-08

## 2021-02-08 ENCOUNTER — TELEPHONE (OUTPATIENT)
Dept: INTERNAL MEDICINE | Facility: CLINIC | Age: 71
End: 2021-02-08

## 2021-04-25 ENCOUNTER — APPOINTMENT (OUTPATIENT)
Dept: PREADMISSION TESTING | Facility: HOSPITAL | Age: 71
End: 2021-04-25

## 2021-08-17 ENCOUNTER — TELEPHONE (OUTPATIENT)
Dept: INTERNAL MEDICINE | Facility: CLINIC | Age: 71
End: 2021-08-17

## 2021-08-17 NOTE — TELEPHONE ENCOUNTER
Dr. Mcmahon [phone]  Requested pt's x-ray of the spine, they advised we need to send a fax request to [phone]. Medical record request has been faxed, fax confirmation has been recieved. Placed in scan pile.

## 2021-08-17 NOTE — TELEPHONE ENCOUNTER
----- Message from Rema Bishop PA-C sent at 8/16/2021  4:29 PM EDT -----  [facility]. Wife thinks late 2020 or early 2021.

## 2021-09-08 ENCOUNTER — OFFICE VISIT (OUTPATIENT)
Dept: INTERNAL MEDICINE | Facility: CLINIC | Age: 71
End: 2021-09-08

## 2021-09-08 VITALS
SYSTOLIC BLOOD PRESSURE: 110 MMHG | RESPIRATION RATE: 16 BRPM | OXYGEN SATURATION: 97 % | HEART RATE: 74 BPM | BODY MASS INDEX: 25.55 KG/M2 | WEIGHT: 168.6 LBS | DIASTOLIC BLOOD PRESSURE: 70 MMHG | HEIGHT: 68 IN | TEMPERATURE: 96.8 F

## 2021-09-08 DIAGNOSIS — Z00.00 INITIAL MEDICARE ANNUAL WELLNESS VISIT: Primary | ICD-10-CM

## 2021-09-08 DIAGNOSIS — G89.29 CHRONIC BILATERAL LOW BACK PAIN WITHOUT SCIATICA: ICD-10-CM

## 2021-09-08 DIAGNOSIS — Z11.59 ENCOUNTER FOR HEPATITIS C SCREENING TEST FOR LOW RISK PATIENT: ICD-10-CM

## 2021-09-08 DIAGNOSIS — E55.9 VITAMIN D DEFICIENCY: ICD-10-CM

## 2021-09-08 DIAGNOSIS — E78.49 OTHER HYPERLIPIDEMIA: ICD-10-CM

## 2021-09-08 DIAGNOSIS — F02.80 LEWY BODY DEMENTIA WITHOUT BEHAVIORAL DISTURBANCE (HCC): ICD-10-CM

## 2021-09-08 DIAGNOSIS — G31.83 LEWY BODY DEMENTIA WITHOUT BEHAVIORAL DISTURBANCE (HCC): ICD-10-CM

## 2021-09-08 DIAGNOSIS — M54.50 CHRONIC BILATERAL LOW BACK PAIN WITHOUT SCIATICA: ICD-10-CM

## 2021-09-08 DIAGNOSIS — Z23 NEED FOR PNEUMOCOCCAL VACCINATION: ICD-10-CM

## 2021-09-08 DIAGNOSIS — Z12.5 SCREENING FOR MALIGNANT NEOPLASM OF PROSTATE: ICD-10-CM

## 2021-09-08 DIAGNOSIS — D22.9 BENIGN NEVUS OF SKIN: ICD-10-CM

## 2021-09-08 PROBLEM — M54.30 SCIATICA: Status: ACTIVE | Noted: 2020-10-25

## 2021-09-08 PROBLEM — R41.841 COGNITIVE COMMUNICATION DEFICIT: Status: ACTIVE | Noted: 2021-08-12

## 2021-09-08 PROCEDURE — G0438 PPPS, INITIAL VISIT: HCPCS | Performed by: PHYSICIAN ASSISTANT

## 2021-09-08 PROCEDURE — G0009 ADMIN PNEUMOCOCCAL VACCINE: HCPCS | Performed by: PHYSICIAN ASSISTANT

## 2021-09-08 PROCEDURE — 90732 PPSV23 VACC 2 YRS+ SUBQ/IM: CPT | Performed by: PHYSICIAN ASSISTANT

## 2021-09-08 RX ORDER — TRAMADOL HYDROCHLORIDE 50 MG/1
50 TABLET ORAL EVERY 8 HOURS PRN
Qty: 20 TABLET | Refills: 0 | Status: SHIPPED | OUTPATIENT
Start: 2021-09-08 | End: 2022-03-14

## 2021-09-08 RX ORDER — SENNOSIDES 8.6 MG
650 CAPSULE ORAL EVERY 8 HOURS PRN
COMMUNITY

## 2021-09-08 NOTE — PROGRESS NOTES
The ABCs of the Annual Wellness Visit  Initial Medicare Wellness Visit    Chief Complaint   Patient presents with   • Medicare Wellness-Initial Visit     Non-Fasting     Subjective   History of Present Illness:  Emmanuel Bermudez is a 71 y.o. male who presents for an Initial Medicare Wellness Visit.    The following portions of the patient's history were reviewed and   updated as appropriate: allergies, current medications, past family history, past medical history, past social history, past surgical history and problem list.      Compared to one year ago, the patient feels his physical   health is worse.    Compared to one year ago, the patient feels his mental   health is the same.    Recent Hospitalizations:  He was not admitted to the hospital during the last year.       Current Medical Providers:  Patient Care Team:  Rema Bishop PA-C as PCP - General (Physician Assistant)    Outpatient Medications Prior to Visit   Medication Sig Dispense Refill   • acetaminophen (TYLENOL) 650 MG 8 hr tablet Take 650 mg by mouth Every 8 (Eight) Hours As Needed.     • Cholecalciferol 125 MCG (5000 UT) tablet Take 1 tablet by mouth Daily. 90 tablet 1   • citalopram (CeleXA) 10 MG tablet Take 1 tablet by mouth Daily. 90 tablet 3   • donepezil (ARICEPT) 10 MG tablet Take 1 tablet by mouth 2 (Two) Times a Day. 180 tablet 3   • loratadine (Claritin) 10 MG tablet Take 1 tablet by mouth Daily. For runny or congested nose 30 tablet 5   • memantine (NAMENDA) 10 MG tablet Take 1 tablet by mouth 2 (Two) Times a Day. 180 tablet 3   • meloxicam (Mobic) 15 MG tablet Take 1 tablet by mouth Daily. 90 tablet 1   • pravastatin (PRAVACHOL) 20 MG tablet Take 1 tablet by mouth Daily. 90 tablet 1   • benzonatate (Tessalon Perles) 100 MG capsule Take 1 capsule by mouth 3 (Three) Times a Day As Needed for Cough. 30 capsule 0     No facility-administered medications prior to visit.       Opioid medication/s are on active medication list.  and I have  "evaluated his active treatment plan and pain score trends (see table).  Vitals:    09/08/21 1534   PainSc:   5   PainLoc: Back  Comment: Discuss MRI order     I have reviewed the chart for potential of high risk medication and harmful drug interactions in the elderly.            Aspirin is not on active medication list.  Aspirin use is contraindicated for this patient due to: current NSAID therapy.  .    Patient Active Problem List   Diagnosis   • Lewy body dementia without behavioral disturbance (CMS/HCC)   • Vitamin D deficiency   • Other hyperlipidemia   • Chronic bilateral low back pain without sciatica   • Cognitive communication deficit   • Sciatica     Advance Care Planning   Advance Directive is on file.  ACP discussion was held with the patient during this visit. Patient has an advance directive in EMR which is still valid.     Review of Systems   Musculoskeletal: Positive for back pain.   Neurological: Positive for tremors and weakness.        +memory deficits        Objective       Vitals:    09/08/21 1534   BP: 110/70   BP Location: Right arm   Patient Position: Sitting   Cuff Size: Adult   Pulse: 74   Resp: 16   Temp: 96.8 °F (36 °C)   TempSrc: Infrared   SpO2: 97%   Weight: 76.5 kg (168 lb 9.6 oz)   Height: 172.7 cm (68\")   PainSc:   5   PainLoc: Back  Comment: Discuss MRI order     BMI Readings from Last 1 Encounters:   09/08/21 25.64 kg/m²   BMI is above normal parameters. Recommendations include: nutrition counseling   Finger Rub Hearing{Test (right ear):passed  Finger Rub Hearing{Test (left ear):passed        Does the patient have evidence of cognitive impairment? Yes    Physical Exam  Constitutional:       Appearance: Normal appearance. He is well-developed.   HENT:      Head: Normocephalic and atraumatic.      Right Ear: Tympanic membrane, ear canal and external ear normal.      Left Ear: Tympanic membrane, ear canal and external ear normal.      Nose: Nose normal.   Eyes:      " Conjunctiva/sclera: Conjunctivae normal.   Neck:      Thyroid: No thyromegaly.   Cardiovascular:      Rate and Rhythm: Normal rate and regular rhythm.      Heart sounds: No murmur heard.     Pulmonary:      Effort: Pulmonary effort is normal.      Breath sounds: Normal breath sounds. No wheezing or rales.   Musculoskeletal:      Cervical back: Neck supple.   Lymphadenopathy:      Cervical: No cervical adenopathy.   Skin:     Findings: No rash.       Lab Results   Component Value Date    TRIG 663 (H) 09/13/2021    HDL 29 (L) 09/13/2021     (H) 09/13/2021    VLDL 125 (H) 09/13/2021          HEALTH RISK ASSESSMENT    Smoking Status:  Social History     Tobacco Use   Smoking Status Never Smoker   Smokeless Tobacco Never Used     Alcohol Consumption:  Social History     Substance and Sexual Activity   Alcohol Use Never     Fall Risk Screen:    STEADI Fall Risk Assessment has not been completed.    Depression Screen:   No flowsheet data found.    Health Habits and Functional and Cognitive Screening:  Functional & Cognitive Status 9/8/2021   Do you have difficulty preparing food and eating? No   Do you have difficulty bathing yourself, getting dressed or grooming yourself? No   Do you have difficulty using the toilet? No   Do you have difficulty moving around from place to place? No   Do you have trouble with steps or getting out of a bed or a chair? No   Current Diet Well Balanced Diet   Dental Exam Up to date   Eye Exam Up to date   Exercise (times per week) 0 times per week   Current Exercises Include No Regular Exercise   Do you need help using the phone?  No   Are you deaf or do you have serious difficulty hearing?  No   Do you need help with transportation? No   Do you need help shopping? No   Do you need help preparing meals?  Yes   Do you need help with housework?  Yes   Do you need help with laundry? No   Do you need help taking your medications? No   Do you need help managing money? No   Do you ever drive  or ride in a car without wearing a seat belt? No   Have you felt unusual stress, anger or loneliness in the last month? No   Who do you live with? Spouse   If you need help, do you have trouble finding someone available to you? No   Have you been bothered in the last four weeks by sexual problems? No   Do you have difficulty concentrating, remembering or making decisions? No       Age-appropriate Screening Schedule:  Refer to the list below for future screening recommendations based on patient's age, sex and/or medical conditions. Orders for these recommended tests are listed in the plan section. The patient has been provided with a written plan.    Health Maintenance   Topic Date Due   • ZOSTER VACCINE (1 of 2) Never done   • INFLUENZA VACCINE  10/01/2021   • LIPID PANEL  09/13/2022   • TDAP/TD VACCINES (2 - Tdap) 09/06/2026            Assessment/Plan      CMS Preventative Services Quick Reference  Risk Factors Identified During Encounter  Dementia/Memory   Obesity/Overweight   The above risks/problems have been discussed with the patient.  Follow up actions/plans if indicated are seen below in the Assessment/Plan Section.  Pertinent information has been shared with the patient in the After Visit Summary.    Diagnoses and all orders for this visit:    1. Initial Medicare annual wellness visit (Primary)  -     CBC & Differential; Future  -     Comprehensive Metabolic Panel; Future  -     Lipid Panel; Future    2. Screening for malignant neoplasm of prostate  -     PSA Screen; Future    3. Vitamin D deficiency  -     Vitamin D 25 Hydroxy; Future    4. Other hyperlipidemia  -     Comprehensive Metabolic Panel; Future  -     Lipid Panel; Future    5. Lewy body dementia without behavioral disturbance (CMS/HCC)  -     CBC & Differential; Future  -     Comprehensive Metabolic Panel; Future    6. Encounter for hepatitis C screening test for low risk patient  -     Hepatitis C Antibody; Future    7. Need for pneumococcal  vaccination  -     Pneumococcal Polysaccharide Vaccine 23-Valent Greater Than or Equal To 1yo Subcutaneous / IM    8. Benign nevus of skin  -     Ambulatory Referral to Dermatology    9. Chronic bilateral low back pain without sciatica  -     traMADol (ULTRAM) 50 MG tablet; Take 1 tablet by mouth Every 8 (Eight) Hours As Needed for Moderate Pain .  Dispense: 20 tablet; Refill: 0    Other orders  -     Cancel: POC Urinalysis Dipstick, Automated         Needs referral to Dr. Spann at -- neurosurgery-- will obtain MRI from Dr. Bon tamayo.     Follow Up:   Return in about 6 months (around 3/8/2022) for Follow up.     An After Visit Summary and PPPS were given to the patient.

## 2021-09-13 ENCOUNTER — LAB (OUTPATIENT)
Dept: INTERNAL MEDICINE | Facility: CLINIC | Age: 71
End: 2021-09-13

## 2021-09-13 DIAGNOSIS — Z11.59 ENCOUNTER FOR HEPATITIS C SCREENING TEST FOR LOW RISK PATIENT: ICD-10-CM

## 2021-09-13 DIAGNOSIS — Z12.5 SCREENING FOR MALIGNANT NEOPLASM OF PROSTATE: ICD-10-CM

## 2021-09-13 DIAGNOSIS — E78.49 OTHER HYPERLIPIDEMIA: ICD-10-CM

## 2021-09-13 DIAGNOSIS — E55.9 VITAMIN D DEFICIENCY: ICD-10-CM

## 2021-09-13 DIAGNOSIS — F02.80 LEWY BODY DEMENTIA WITHOUT BEHAVIORAL DISTURBANCE (HCC): ICD-10-CM

## 2021-09-13 DIAGNOSIS — G31.83 LEWY BODY DEMENTIA WITHOUT BEHAVIORAL DISTURBANCE (HCC): ICD-10-CM

## 2021-09-13 DIAGNOSIS — Z00.00 INITIAL MEDICARE ANNUAL WELLNESS VISIT: ICD-10-CM

## 2021-09-13 LAB
25(OH)D3 SERPL-MCNC: 35.5 NG/ML (ref 30–100)
ALBUMIN SERPL-MCNC: 4.4 G/DL (ref 3.5–5.2)
ALBUMIN/GLOB SERPL: 1.9 G/DL
ALP SERPL-CCNC: 82 U/L (ref 39–117)
ALT SERPL W P-5'-P-CCNC: 19 U/L (ref 1–41)
ANION GAP SERPL CALCULATED.3IONS-SCNC: 9.6 MMOL/L (ref 5–15)
AST SERPL-CCNC: 43 U/L (ref 1–40)
BASOPHILS # BLD AUTO: 0.04 10*3/MM3 (ref 0–0.2)
BASOPHILS NFR BLD AUTO: 0.6 % (ref 0–1.5)
BILIRUB SERPL-MCNC: 0.3 MG/DL (ref 0–1.2)
BUN SERPL-MCNC: 14 MG/DL (ref 8–23)
BUN/CREAT SERPL: 13.7 (ref 7–25)
CALCIUM SPEC-SCNC: 9.9 MG/DL (ref 8.6–10.5)
CHLORIDE SERPL-SCNC: 101 MMOL/L (ref 98–107)
CHOLEST SERPL-MCNC: 290 MG/DL (ref 0–200)
CO2 SERPL-SCNC: 28.4 MMOL/L (ref 22–29)
CREAT SERPL-MCNC: 1.02 MG/DL (ref 0.76–1.27)
DEPRECATED RDW RBC AUTO: 45.1 FL (ref 37–54)
EOSINOPHIL # BLD AUTO: 0.06 10*3/MM3 (ref 0–0.4)
EOSINOPHIL NFR BLD AUTO: 0.9 % (ref 0.3–6.2)
ERYTHROCYTE [DISTWIDTH] IN BLOOD BY AUTOMATED COUNT: 13.1 % (ref 12.3–15.4)
GFR SERPL CREATININE-BSD FRML MDRD: 72 ML/MIN/1.73
GLOBULIN UR ELPH-MCNC: 2.3 GM/DL
GLUCOSE SERPL-MCNC: 86 MG/DL (ref 65–99)
HCT VFR BLD AUTO: 40 % (ref 37.5–51)
HCV AB SER DONR QL: NORMAL
HDLC SERPL-MCNC: 29 MG/DL (ref 40–60)
HGB BLD-MCNC: 13.6 G/DL (ref 13–17.7)
IMM GRANULOCYTES # BLD AUTO: 0.03 10*3/MM3 (ref 0–0.05)
IMM GRANULOCYTES NFR BLD AUTO: 0.5 % (ref 0–0.5)
LDLC SERPL CALC-MCNC: 136 MG/DL (ref 0–100)
LDLC/HDLC SERPL: 4.43 {RATIO}
LYMPHOCYTES # BLD AUTO: 2.22 10*3/MM3 (ref 0.7–3.1)
LYMPHOCYTES NFR BLD AUTO: 34.7 % (ref 19.6–45.3)
MCH RBC QN AUTO: 32.3 PG (ref 26.6–33)
MCHC RBC AUTO-ENTMCNC: 34 G/DL (ref 31.5–35.7)
MCV RBC AUTO: 95 FL (ref 79–97)
MONOCYTES # BLD AUTO: 0.58 10*3/MM3 (ref 0.1–0.9)
MONOCYTES NFR BLD AUTO: 9.1 % (ref 5–12)
NEUTROPHILS NFR BLD AUTO: 3.46 10*3/MM3 (ref 1.7–7)
NEUTROPHILS NFR BLD AUTO: 54.2 % (ref 42.7–76)
NRBC BLD AUTO-RTO: 0 /100 WBC (ref 0–0.2)
PLATELET # BLD AUTO: 172 10*3/MM3 (ref 140–450)
PMV BLD AUTO: 11.5 FL (ref 6–12)
POTASSIUM SERPL-SCNC: 4 MMOL/L (ref 3.5–5.2)
PROT SERPL-MCNC: 6.7 G/DL (ref 6–8.5)
PSA SERPL-MCNC: 1.23 NG/ML (ref 0–4)
RBC # BLD AUTO: 4.21 10*6/MM3 (ref 4.14–5.8)
SODIUM SERPL-SCNC: 139 MMOL/L (ref 136–145)
TRIGL SERPL-MCNC: 663 MG/DL (ref 0–150)
VLDLC SERPL-MCNC: 125 MG/DL (ref 5–40)
WBC # BLD AUTO: 6.39 10*3/MM3 (ref 3.4–10.8)

## 2021-09-13 PROCEDURE — 85025 COMPLETE CBC W/AUTO DIFF WBC: CPT | Performed by: PHYSICIAN ASSISTANT

## 2021-09-13 PROCEDURE — G0103 PSA SCREENING: HCPCS | Performed by: PHYSICIAN ASSISTANT

## 2021-09-13 PROCEDURE — 80053 COMPREHEN METABOLIC PANEL: CPT | Performed by: PHYSICIAN ASSISTANT

## 2021-09-13 PROCEDURE — 86803 HEPATITIS C AB TEST: CPT | Performed by: PHYSICIAN ASSISTANT

## 2021-09-13 PROCEDURE — 36415 COLL VENOUS BLD VENIPUNCTURE: CPT | Performed by: PHYSICIAN ASSISTANT

## 2021-09-13 PROCEDURE — 80061 LIPID PANEL: CPT | Performed by: PHYSICIAN ASSISTANT

## 2021-09-13 PROCEDURE — 82306 VITAMIN D 25 HYDROXY: CPT | Performed by: PHYSICIAN ASSISTANT

## 2021-09-30 ENCOUNTER — TELEPHONE (OUTPATIENT)
Dept: INTERNAL MEDICINE | Facility: CLINIC | Age: 71
End: 2021-09-30

## 2021-09-30 DIAGNOSIS — E78.49 OTHER HYPERLIPIDEMIA: Primary | ICD-10-CM

## 2021-10-01 RX ORDER — PRAVASTATIN SODIUM 20 MG
20 TABLET ORAL DAILY
Qty: 90 TABLET | Refills: 1 | Status: SHIPPED | OUTPATIENT
Start: 2021-10-01 | End: 2022-03-28

## 2021-10-27 ENCOUNTER — TELEPHONE (OUTPATIENT)
Dept: INTERNAL MEDICINE | Facility: CLINIC | Age: 71
End: 2021-10-27

## 2021-10-27 DIAGNOSIS — M43.10 RETROLISTHESIS OF VERTEBRAE: ICD-10-CM

## 2021-10-27 DIAGNOSIS — M54.50 CHRONIC BILATERAL LOW BACK PAIN WITHOUT SCIATICA: ICD-10-CM

## 2021-10-27 DIAGNOSIS — G89.29 CHRONIC BILATERAL LOW BACK PAIN WITHOUT SCIATICA: ICD-10-CM

## 2021-10-27 DIAGNOSIS — M51.36 BULGING LUMBAR DISC: Primary | ICD-10-CM

## 2021-11-01 NOTE — TELEPHONE ENCOUNTER
Referral placed. Copy of MRI on Yulisa's desk, and copy placed in scan pile as well to be loaded into chart.

## 2021-11-01 NOTE — TELEPHONE ENCOUNTER
Called Dr Crockett's office at [phone]  Talked to Halle-MRI was done in March 2021-she will fax to me

## 2021-11-02 NOTE — TELEPHONE ENCOUNTER
Spoke to pt's wife advised of clinical message. Confirmed referrals is working on the referral, they will contact them as soon as it's ready and approved to be scheduled. Good verbal understanding.

## 2022-01-05 ENCOUNTER — TRANSCRIBE ORDERS (OUTPATIENT)
Dept: PHYSICAL THERAPY | Facility: CLINIC | Age: 72
End: 2022-01-05

## 2022-01-05 DIAGNOSIS — G89.29 CHRONIC BILATERAL LOW BACK PAIN WITHOUT SCIATICA: Primary | ICD-10-CM

## 2022-01-05 DIAGNOSIS — R26.89 BALANCE PROBLEM: ICD-10-CM

## 2022-01-05 DIAGNOSIS — M25.559 PAIN, HIP: ICD-10-CM

## 2022-01-05 DIAGNOSIS — M54.50 CHRONIC BILATERAL LOW BACK PAIN WITHOUT SCIATICA: Primary | ICD-10-CM

## 2022-01-06 ENCOUNTER — TELEPHONE (OUTPATIENT)
Dept: ORTHOPEDICS | Facility: OTHER | Age: 72
End: 2022-01-06

## 2022-03-14 ENCOUNTER — OFFICE VISIT (OUTPATIENT)
Dept: INTERNAL MEDICINE | Facility: CLINIC | Age: 72
End: 2022-03-14

## 2022-03-14 ENCOUNTER — TREATMENT (OUTPATIENT)
Dept: PHYSICAL THERAPY | Facility: CLINIC | Age: 72
End: 2022-03-14

## 2022-03-14 VITALS
TEMPERATURE: 97.7 F | BODY MASS INDEX: 26.22 KG/M2 | DIASTOLIC BLOOD PRESSURE: 80 MMHG | OXYGEN SATURATION: 96 % | HEIGHT: 68 IN | SYSTOLIC BLOOD PRESSURE: 110 MMHG | RESPIRATION RATE: 15 BRPM | WEIGHT: 173 LBS | HEART RATE: 73 BPM

## 2022-03-14 DIAGNOSIS — M54.50 CHRONIC BILATERAL LOW BACK PAIN WITHOUT SCIATICA: ICD-10-CM

## 2022-03-14 DIAGNOSIS — F02.80 LEWY BODY DEMENTIA WITHOUT BEHAVIORAL DISTURBANCE: ICD-10-CM

## 2022-03-14 DIAGNOSIS — G89.29 CHRONIC BILATERAL LOW BACK PAIN WITHOUT SCIATICA: ICD-10-CM

## 2022-03-14 DIAGNOSIS — R26.89 BALANCE PROBLEM: ICD-10-CM

## 2022-03-14 DIAGNOSIS — M25.662 KNEE STIFFNESS, LEFT: ICD-10-CM

## 2022-03-14 DIAGNOSIS — G31.83 LEWY BODY DEMENTIA WITHOUT BEHAVIORAL DISTURBANCE: ICD-10-CM

## 2022-03-14 DIAGNOSIS — M25.559 PAIN, HIP: ICD-10-CM

## 2022-03-14 DIAGNOSIS — E78.49 OTHER HYPERLIPIDEMIA: Primary | ICD-10-CM

## 2022-03-14 PROBLEM — G31.84 MINOR NEUROCOGNITIVE DISORDER: Status: ACTIVE | Noted: 2021-01-19

## 2022-03-14 PROCEDURE — 97110 THERAPEUTIC EXERCISES: CPT | Performed by: PHYSICAL THERAPIST

## 2022-03-14 PROCEDURE — 97162 PT EVAL MOD COMPLEX 30 MIN: CPT | Performed by: PHYSICAL THERAPIST

## 2022-03-14 PROCEDURE — 99214 OFFICE O/P EST MOD 30 MIN: CPT | Performed by: PHYSICIAN ASSISTANT

## 2022-03-14 NOTE — PROGRESS NOTES
Chief Complaint   Patient presents with   • Hyperlipidemia     6 month F/U   • Leg Pain     2 weeks F/U, locking up, stiffness       Subjective       History of Present Illness     Emmanuel Bermudez is a 71 y.o. male presents today for 6 month follow-up. Patient has been doing well since his last visit.     Patient has a back procedure on 02/11/22 where they cauterized the nerves. States at first he had no relief but as it healed, his back pain improved. He is still not where he can get up comfortably. Patient is now having trouble with his hips. He is following with physical therapy and has been doing some exercises to help with the pain. Currently reports that his back pain is down to a 3-4 out of 10. When he is active and running around with his grandson, he usually has to sit down to pain exacerbation in his back. With activity his pain is much higher on a pain scale.     He has also noticed some stiffness in the left leg when walking. Describes the stiffness is usually at the knee joint. States that it has locked up sporadically within the past several weeks, however, he had an episode where the knee locked up for 2 to 3 minutes. Denies any injuries or pain in the knee.     Patient would like to go over his medications. He wonders if he can take the Tylenol arthritis during the day as well. He is currently taking the Tylenol twice in the morning and twice in the evening. He remains compliant with medication regimen.     He continues to follow with Dr. Shen, neurology at . Also follows with Dr. Hurt, who is a movement disorder specialist.     Denies any chest pain, shortness of breath, abdominal pain, nausea, vomiting. Denies any side effects from his medications.     The following portions of the patient's history were reviewed and updated as appropriate: allergies, current medications, past medical history, past social history, past surgical history and problem list.    No Known Allergies  Social History      Tobacco Use   • Smoking status: Never Smoker   • Smokeless tobacco: Never Used   Substance Use Topics   • Alcohol use: Never         Current Outpatient Medications:   •  acetaminophen (TYLENOL) 650 MG 8 hr tablet, Take 650 mg by mouth Every 8 (Eight) Hours As Needed., Disp: , Rfl:   •  carbidopa-levodopa (SINEMET)  MG per tablet, Take 2 tablets by mouth 3 (Three) Times a Day., Disp: , Rfl:   •  Cholecalciferol 125 MCG (5000 UT) tablet, Take 1 tablet by mouth Daily., Disp: 90 tablet, Rfl: 1  •  citalopram (CeleXA) 10 MG tablet, Take 1 tablet by mouth Daily., Disp: 90 tablet, Rfl: 3  •  donepezil (ARICEPT) 10 MG tablet, Take 1 tablet by mouth 2 (Two) Times a Day., Disp: 180 tablet, Rfl: 3  •  memantine (NAMENDA) 10 MG tablet, Take 1 tablet by mouth 2 (Two) Times a Day., Disp: 180 tablet, Rfl: 3  •  pravastatin (Pravachol) 20 MG tablet, Take 1 tablet by mouth Daily., Disp: 90 tablet, Rfl: 1    Review of Systems   Constitutional: Positive for fatigue. Negative for chills and fever.   HENT: Negative for congestion, ear pain, sore throat and trouble swallowing.    Eyes: Negative for pain.   Respiratory: Negative for cough, shortness of breath and wheezing.    Cardiovascular: Negative for chest pain and palpitations.   Gastrointestinal: Negative for abdominal pain, diarrhea, nausea and vomiting.   Genitourinary: Negative for dysuria.   Musculoskeletal: Positive for arthralgias and back pain.        Left knee stiffness   Skin: Negative for rash.   Allergic/Immunologic: Negative for immunocompromised state.   Neurological: Positive for memory problem. Negative for dizziness, syncope, weakness and headache.   Psychiatric/Behavioral: Negative for depressed mood.       Objective   Vitals:    03/14/22 1532   BP: 110/80   Pulse: 73   Resp: 15   Temp: 97.7 °F (36.5 °C)   SpO2: 96%     Body mass index is 26.3 kg/m².    Physical Exam  Vitals reviewed.   Constitutional:       Appearance: He is well-developed.   HENT:       Head: Normocephalic and atraumatic.      Right Ear: Tympanic membrane, ear canal and external ear normal. No tenderness.      Left Ear: Tympanic membrane, ear canal and external ear normal. No tenderness.      Nose: Nose normal.      Mouth/Throat:      Mouth: Mucous membranes are moist. No oral lesions.      Pharynx: Oropharynx is clear.   Eyes:      General: No scleral icterus.     Conjunctiva/sclera: Conjunctivae normal.   Neck:      Thyroid: No thyromegaly.   Cardiovascular:      Rate and Rhythm: Normal rate and regular rhythm.      Pulses: Normal pulses.           Radial pulses are 2+ on the right side and 2+ on the left side.        Dorsalis pedis pulses are 2+ on the right side and 2+ on the left side.      Heart sounds: Normal heart sounds. No murmur heard.    No gallop.   Pulmonary:      Effort: Pulmonary effort is normal.      Breath sounds: Normal breath sounds. No wheezing or rales.   Chest:      Chest wall: No deformity.   Abdominal:      General: Bowel sounds are normal.      Palpations: Abdomen is soft. There is no mass.      Tenderness: There is no abdominal tenderness. Negative signs include English's sign.   Musculoskeletal:         General: No tenderness or deformity. Normal range of motion.      Cervical back: Neck supple.      Left knee: Normal range of motion. No tenderness. No LCL laxity, MCL laxity, ACL laxity or PCL laxity.     Instability Tests: Anterior drawer test negative. Posterior drawer test negative.      Comments: Left knee:  no tenderness to palpation. Normal active range of motion. No laxity noted. Negative anterior drawer, negative posterior drawer.   Lymphadenopathy:      Cervical: No cervical adenopathy.   Skin:     General: Skin is warm and dry.      Findings: No rash.   Neurological:      Mental Status: He is alert.   Psychiatric:         Behavior: Behavior normal.               Assessment/Plan   Diagnoses and all orders for this visit:    1. Other hyperlipidemia (Primary)  -  stable and well controlled on Pravastatin.     2. Lewy body dementia without behavioral disturbance (HCC)  - He continues to follow with neurology, Dr. Shen and Dr. Hurt, a movement specialist.   - continue current medications.     3. Knee stiffness, Left  - Patient reports no pain with this stiffness. Suspect arthritis, and somewhat complicated with his lack of physical activity. Encouraged him to begin walking when feasible even if this is just for short periods of time/ short distance as I feel his inactivity will further worsen his joint stiffness.      4. Chronic bilateral low back pain without sciatica  - Continue follow up with [facility]. OK to take 1 tablet Aleve in mid-day, and Tylenol BID as discussed.         Return in about 6 months (around 9/14/2022) for Subs AMW .        Transcribed from ambient dictation for Rema Bishop PA-C by Trish Simmons.  03/14/22   14:11 EDT    Patient verbalized consent to the visit recording.  I have personally performed the services described in this document as transcribed by the above individual, and it is both accurate and complete.  Rema Bishop PA-C  3/15/2022  10:31 EDT

## 2022-03-14 NOTE — PROGRESS NOTES
"  Physical Therapy Initial Evaluation and Plan of Care      Patient: Emmanuel Bermudez   : 1950  Diagnosis/ICD-10 Code:  There were no encounter diagnoses.   Referring practitioner: SADE Escobar  Date of Initial Visit: No linked episodes  Today's Date: 3/14/2022  Patient seen for Visit count could not be calculated. Make sure you are using a visit which is associated with an episode. sessions         Visit Diagnoses:  No diagnosis found.    Subjective Questionnaire: Oswestry: 44%    Subjective Evaluation    History of Present Illness  Onset date: 4-5 years ago.  Mechanism of injury: Pt does not recall what may have originally brought on his low back pain.  He reports chronic pain for the past 4 to 5 years.  He has had previous PT, but stopped his most recent course of treatment prematurely.  He has a surgical scar at R low back, but he does not recall what was done.  He says he recently had RF ablation procedure and this may have helped some with the pain.  He describes bilateral low back pain and bilateral hip pain intermittently.    Pt denies any recent falls.    Subjective comment: low back pain  Patient Occupation: Retired.  Says he \"does not do much\".  He reports no particular interests.  He tries to walk, but says he does not do as much as he should.   Pain  Current pain ratin  At best pain ratin  At worst pain ratin  Location: bilateral low back pain and bilateral hip pain  Quality: sharp (intermittent)  Relieving factors: heat and change in position  Aggravating factors: ambulation and standing (bending)  Progression: improved    Social Support  Lives with: spouse    Diagnostic Tests  MRI studies: abnormal (2021: see EMR for detailed results, degenerative changes most pronounced at L4-5 and L5-S1)    Treatments  Previous treatment: physical therapy and injection treatment (RF ablation procedure)  Patient Goals  Patient goals for therapy: decreased pain           Treatment   "              Objective          Postural Observations  Seated posture: poor  Standing posture: poor    Additional Postural Observation Details  Pronounced FHP, increased thoracic kyphosis, R thoracolumbar scoliosis, R scapula higher than left, pelvis appears level.    Neurological Testing     Sensation     Lumbar   Left   Intact: light touch    Right   Intact: light touch    Reflexes   Left   Patellar (L4): normal (2+)  Achilles (S1): trace (1+)    Right   Patellar (L4): normal (2+)  Achilles (S1): trace (1+)    Active Range of Motion     Additional Active Range of Motion Details  FIS: flexes knees, reports low back pain  EIS: less painful than forward bending  CARLOS: stretches low back  EIL: limited range, mild localized low back discomfort  Limited hip IR more than ER bilaterally.    Strength/Myotome Testing     Lumbar   Left   Heel walk: normal  Toe walk: normal    Right   Heel walk: normal  Toe walk: normal    Left Hip   Planes of Motion   Flexion: 4+  Extension: 3-  Abduction: 3    Right Hip   Planes of Motion   Flexion: 4+  Extension: 3-  Abduction: 3    Left Knee   Flexion: 5  Extension: 5    Right Knee   Flexion: 5  Extension: 5    Left Ankle/Foot   Dorsiflexion: 5  Plantar flexion: 5    Right Ankle/Foot   Dorsiflexion: 5  Plantar flexion: 5    Tests       Thoracic   Negative slump.     Lumbar     Left   Negative passive SLR.     Right   Negative passive SLR.     Lumbar Flexibility Comments:   Marked reduction in hamstring flexibility bilaterally.  Decreased hip flexor and quad flexibility.     General Comments     Lumbar Comments  PMH is significant for Lewy Body Dementia.  Gait, posture, and mobility consistent with Parkinson-like disease process.         Assessment & Plan     Assessment  Impairments: abnormal gait, abnormal or restricted ROM, activity intolerance, impaired balance, impaired physical strength, lacks appropriate home exercise program and pain with function  Functional Limitations: lifting,  walking, uncomfortable because of pain and standing  Assessment details: Pt presents with chronic low back pain and loss of lumbar ROM, as well as limited LE flexibility.  He may benefit from PT intervention to improve lumbar and LE mobility and flexibility, as well as addressing postural limitations likely contributing to chronic pain.  Prognosis: fair    Goals  Plan Goals: 4 weeks  Pt. demonstrates independence and compliance with initial HEP.  Pt. reports reduction in pain intensity to no worse than 5/10 on NPRS.  AROM of lumbar spine and LE's shows improvement over baseline measures to improve freedom of mobility with less pain.      6 weeks  Pt. demonstrates independence in advanced HEP and improving postural awareness to prevent/reduce chronic recurring back pain.  AROM of lumbar spine and LE's is sufficient for performance of daily activities.  Bilateral hip strength is improved over baseline measures to allow participation in desired activities such as walking.  Functional outcome measure is improved to 34% or less, indicating decreasing functional limitations related to back pain.          Plan  Therapy options: will be seen for skilled therapy services  Planned modality interventions: thermotherapy (hydrocollator packs) and cryotherapy  Planned therapy interventions: abdominal trunk stabilization, manual therapy, neuromuscular re-education, balance/weight-bearing training, body mechanics training, postural training, spinal/joint mobilization, strengthening, stretching, therapeutic activities, joint mobilization, home exercise program, functional ROM exercises, flexibility and gait training  Frequency: 1x week  Duration in weeks: 6  Treatment plan discussed with: patient  Plan details: PT weekly for 6 weeks, addressing pain, posture, and flexibility and strength deficits as noted.        Timed:  Manual Therapy:         mins  55486;  Therapeutic Exercise:    10     mins  56237;     Neuromuscular Ian:         mins  70386;    Therapeutic Activity:          mins  80101;     Gait Training:           mins  44553;     Ultrasound:          mins  32713;    Electrical Stimulation:         mins  54176 ( );  Iontophoresis  ___ mins   64561    Untimed:  Electrical Stimulation:         mins  45114 ( );  Mechanical Traction:         mins  40362;     Timed Treatment:   10   mins   Total Treatment:     45   mins    PT SIGNATURE: Estela Lau PT   Electronically signed  DATE TREATMENT INITIATED: 3/14/2022    Initial Certification  Certification Period: 3/14/5850tlwo2/11/2022  I certify that the therapy services are furnished while this patient is under my care.  The services outlined above are required by this patient, and will be reviewed every 90 days.    Physician Signature:_____________________________________________             PHYSICIAN: Neelam Arthur APRN  NPI: [id number]                                      DATE:       Please sign and return via fax to [phone].. Thank you, [facility].

## 2022-03-26 DIAGNOSIS — E78.49 OTHER HYPERLIPIDEMIA: ICD-10-CM

## 2022-03-28 RX ORDER — PRAVASTATIN SODIUM 20 MG
TABLET ORAL
Qty: 90 TABLET | Refills: 0 | Status: SHIPPED | OUTPATIENT
Start: 2022-03-28 | End: 2022-06-20

## 2022-04-07 ENCOUNTER — TREATMENT (OUTPATIENT)
Dept: PHYSICAL THERAPY | Facility: CLINIC | Age: 72
End: 2022-04-07

## 2022-04-07 DIAGNOSIS — M54.50 CHRONIC BILATERAL LOW BACK PAIN WITHOUT SCIATICA: Primary | ICD-10-CM

## 2022-04-07 DIAGNOSIS — G89.29 CHRONIC BILATERAL LOW BACK PAIN WITHOUT SCIATICA: Primary | ICD-10-CM

## 2022-04-07 PROCEDURE — 97110 THERAPEUTIC EXERCISES: CPT | Performed by: PHYSICAL THERAPIST

## 2022-04-07 NOTE — PROGRESS NOTES
Physical Therapy Daily Progress Note  VISIT: 2          Subjective    Emmanuel Bermudez reports: moderate R low back pain today.  He had increased pain after traveling to [place] and touring the [employer] about one week ago.  He notices pain when he stands at sink to do dishes or when he walks for more than 10 minutes.      Pre-treatment pain:  5  Post-treatment pain:  2      Objective    Treatment    Exercise 1  Exercise Name 1: HEP review  Exercise 2  Exercise Name 2: standing lumbar extension-3 options-pt prefers hands on low back during backward bend  Exercise 3  Exercise Name 3: prone quad stretch-assisted  Exercise 4  Exercise Name 4: prone passive hip extension  Exercise 5  Exercise Name 5: prone on elbows  Exercise 6  Exercise Name 6: ball roll flexion  Exercise 7  Exercise Name 7: double knee to chest stretch  Exercise 8  Exercise Name 8: pt demonstrated exercises he does at home on his own-suggestions provided to reduce back strain  Exercise 9  Exercise Name 9: gait in corridor, cues for posture  Exercise 10  Exercise Name 10: standing hip flexor stretch    Manual Rx 1  Manual Rx 1 Location: lumbar spine  Manual Rx 1 Type: B PA mobilization to improve ease of mobility into extension            Assessment & Plan     Assessment    Assessment details: Pt has difficulty recalling all of the exercises he does at home from previous courses of therapy.  He seemed to respond well to exercises in clinic today, but requires many cues for best technique.    Plan  Plan details: Continue PT.  Progress exercises as symptom response indicates.               Timed:  Manual Therapy:    2     mins  98514;  Therapeutic Exercise:    38     mins  02520;     Neuromuscular Ian:        mins  20431;    Therapeutic Activity:          mins  99573;     Gait Training:           mins  01650;     Ultrasound:          mins  82867;    Electrical Stimulation:         mins  44170 ( );    Untimed:  Electrical  Stimulation:         mins  87108 ( );  Mechanical Traction:         mins  21037;     Timed Treatment:   40   mins   Total Treatment:     40   mins      Estela Lau PT  Physical Therapist

## 2022-05-03 ENCOUNTER — TREATMENT (OUTPATIENT)
Dept: PHYSICAL THERAPY | Facility: CLINIC | Age: 72
End: 2022-05-03

## 2022-05-03 DIAGNOSIS — G89.29 CHRONIC BILATERAL LOW BACK PAIN WITHOUT SCIATICA: Primary | ICD-10-CM

## 2022-05-03 DIAGNOSIS — M54.50 CHRONIC BILATERAL LOW BACK PAIN WITHOUT SCIATICA: Primary | ICD-10-CM

## 2022-05-03 PROCEDURE — 97110 THERAPEUTIC EXERCISES: CPT | Performed by: PHYSICAL THERAPIST

## 2022-05-03 NOTE — PROGRESS NOTES
Physical Therapy Daily Progress Note    Patient: Emmanuel Bermudez   : 1950  Diagnosis/ICD-10 Code:  The encounter diagnosis was Chronic bilateral low back pain without sciatica.   Referring practitioner: SADE Escobar  Date of Initial Visit: Type: THERAPY  Noted: 3/14/2022  Today's Date: 2022  Visit:  3     Emmanuel Bermudez reports: his pain is about the same.  He is doing his home exercises.  He has not had any falls recently.    Subjective     Treatment  Pre-treatment pain:  5  Post-treatment pain:  4    Exercise 1  Exercise Name 1: Reassessment  Exercise 2  Exercise Name 2: Detailed HEP review and additions  Exercise 3  Exercise Name 3: prone hip extension  Exercise 4  Exercise Name 4: bridging         Functional Testing  Functional Tests Options: Modified Gonzalez 44% (unchanged since initial assessment)   Objective          Strength/Myotome Testing     Left Hip   Planes of Motion   Extension: 3+  Abduction: 3+    Right Hip   Planes of Motion   Extension: 3+  Abduction: 3+        Assessment & Plan     Assessment    Assessment details: Minimal improvement noted after 3 visits. Pt will benefit from completing scheduled visits to progress exercises to maximum potential.  Rehab potential is limited by Lewy Body dementia.    Goals  Plan Goals: Plan Goals: 4 weeks  Pt. demonstrates independence and compliance with initial HEP-met.  Pt. reports reduction in pain intensity to no worse than 5/10 on NPRS-ongoing.  AROM of lumbar spine and LE's shows improvement over baseline measures to improve freedom of mobility with less pain-ongoing.      6 weeks-ongoing  Pt. demonstrates independence in advanced HEP and improving postural awareness to prevent/reduce chronic recurring back pain.  AROM of lumbar spine and LE's is sufficient for performance of daily activities.  Bilateral hip strength is improved over baseline measures to allow participation in desired activities such as walking.  Functional outcome  measure is improved to 34% or less, indicating decreasing functional limitations related to back pain.     Plan  Plan details: Continue PT.  Progress clinic and home exercises as tolerated.               Timed:  Manual Therapy:         mins  51245;  Therapeutic Exercise:    40     mins  54199;     Neuromuscular Ian:        mins  09521;    Therapeutic Activity:          mins  65093;     Gait Training:           mins  75995;     Ultrasound:          mins  91686;    Electrical Stimulation:         mins  91729 ( );    Untimed:  Electrical Stimulation:         mins  72145 ( );  Mechanical Traction:         mins  87627;     Timed Treatment:   40   mins   Total Treatment:     40   mins      Estela Lau, LAURA  Physical Therapist

## 2022-05-12 ENCOUNTER — TREATMENT (OUTPATIENT)
Dept: PHYSICAL THERAPY | Facility: CLINIC | Age: 72
End: 2022-05-12

## 2022-05-12 DIAGNOSIS — M54.50 CHRONIC BILATERAL LOW BACK PAIN WITHOUT SCIATICA: Primary | ICD-10-CM

## 2022-05-12 DIAGNOSIS — M25.559 PAIN, HIP: ICD-10-CM

## 2022-05-12 DIAGNOSIS — G89.29 CHRONIC BILATERAL LOW BACK PAIN WITHOUT SCIATICA: Primary | ICD-10-CM

## 2022-05-12 PROCEDURE — 97110 THERAPEUTIC EXERCISES: CPT | Performed by: PHYSICAL THERAPIST

## 2022-05-12 PROCEDURE — 97140 MANUAL THERAPY 1/> REGIONS: CPT | Performed by: PHYSICAL THERAPIST

## 2022-05-12 NOTE — PROGRESS NOTES
"      Physical Therapy Daily Progress Note  VISIT: 4          Subjective    Emmanuel Bermudez reports: he is discouraged.  He reports R hip pain and he says \"no one is doing anything for my hip\".  When patient points to location of pain, he indicates R superior gluteal and buttock area.      Pre-treatment pain:  0 (no pain at rest, increase in pain with standing/walking)  Post-treatment pain:  0      Objective    Treatment    Exercise 1  Exercise Name 1: L side glide in standing with R side toward wall  Exercise 2  Exercise Name 2: hip rotation stretches  Exercise 3  Exercise Name 3: lumbar rotation stretch-pulling R LE across    Manual Rx 1  Manual Rx 1 Location: R hip  Manual Rx 1 Type: belt assisted lateral femoral glide, R LAD, AA-PROM R hip IR/ER            Assessment & Plan     Assessment    Assessment details: R PREETHI produces LS pain but not hip joint pain.  Pt denies tenderness at greater trochanter.  Pain location and behavior is more consistent with pain of lumbar origin.  Pt seemed to get some relief with side glides in standing.    Plan  Plan details: Continue for 2 more scheduled visits, then transition to [facility].               Timed:  Manual Therapy:    10     mins  49177;  Therapeutic Exercise:    20     mins  80129;     Neuromuscular Ian:        mins  14074;    Therapeutic Activity:          mins  75278;     Gait Training:           mins  76945;     Ultrasound:          mins  14441;    Electrical Stimulation:         mins  84658 ( );    Untimed:  Electrical Stimulation:         mins  17824 ( );  Mechanical Traction:         mins  50652;  Canalith Repositioning       mins  97588    Timed Treatment:   30   mins   Total Treatment:     30   mins      Estela Lau, PT  Physical Therapist                    "

## 2022-05-17 ENCOUNTER — TELEPHONE (OUTPATIENT)
Dept: PHYSICAL THERAPY | Facility: CLINIC | Age: 72
End: 2022-05-17

## 2022-05-21 NOTE — TELEPHONE ENCOUNTER
Clinical message relayed to spouse Elva. Good verbal understanding.   
Please let wife know this has been sent in, per our MyChart discussion.   
I personally performed the service described in the documentation recorded by the scribe in my presence, and it accurately and completely records my words and actions.

## 2022-05-24 ENCOUNTER — TREATMENT (OUTPATIENT)
Dept: PHYSICAL THERAPY | Facility: CLINIC | Age: 72
End: 2022-05-24

## 2022-05-24 DIAGNOSIS — M54.50 CHRONIC BILATERAL LOW BACK PAIN WITHOUT SCIATICA: ICD-10-CM

## 2022-05-24 DIAGNOSIS — M25.559 PAIN, HIP: Primary | ICD-10-CM

## 2022-05-24 DIAGNOSIS — G89.29 CHRONIC BILATERAL LOW BACK PAIN WITHOUT SCIATICA: ICD-10-CM

## 2022-05-24 PROCEDURE — 97110 THERAPEUTIC EXERCISES: CPT | Performed by: PHYSICAL THERAPIST

## 2022-05-24 PROCEDURE — 97112 NEUROMUSCULAR REEDUCATION: CPT | Performed by: PHYSICAL THERAPIST

## 2022-05-24 NOTE — PROGRESS NOTES
Physical Therapy Daily Progress Note/Discharge Note    Patient: Emmanuel Bermudez   : 1950  Diagnosis/ICD-10 Code:  The primary encounter diagnosis was Pain, hip. A diagnosis of Chronic bilateral low back pain without sciatica was also pertinent to this visit.   Referring practitioner: SADE Escobar  Date of Initial Visit: Type: THERAPY  Noted: 3/14/2022  Today's Date: 2022  Visit:  Sandi Bermudez reports: mild intermittent R low back pain persists, but is relieved with rest and exercises.    Subjective     Treatment  Pre-treatment pain:  3  Post-treatment pain:  0    Exercise 1  Exercise Name 1: Nu-Step  Time: 8'  Exercise 2  Exercise Name 2: Detailed HEP review/practice  Exercise 3  Exercise Name 3: side glide in standing  Exercise 4  Exercise Name 4: lumbar rotation stretch  Exercise 5  Exercise Name 5: BKFO  Exercise 6  Exercise Name 6: hip rotation stretch  Exercise 7  Exercise Name 7: bridging  Exercise 8  Exercise Name 8: Rhomberg balance-solid and foam surface  Exercise 9  Exercise Name 9: semi-tandem balance-solid surface  Exercise 10  Exercise Name 10: single leg balance practice  Exercise 11  Exercise Name 11: heel/toe raises             Objective     Assessment & Plan     Assessment    Assessment details: Pt has improved since initial evaluation, and has reached plateau in progress with limiting factors including Lewy Body Dementia.    Goals  Plan Goals: Plan Goals: 4 weeks  Pt. demonstrates independence and compliance with initial HEP-met.  Pt. reports reduction in pain intensity to no worse than 5/10 on NPRS-met.  AROM of lumbar spine and LE's shows improvement over baseline measures to improve freedom of mobility with less pain-met.      6 weeks-ongoing  Pt. demonstrates independence in advanced HEP and improving postural awareness to prevent/reduce chronic recurring back pain-partially met.  AROM of lumbar spine and LE's is sufficient for performance of daily  activities-met.  Bilateral hip strength is improved over baseline measures to allow participation in desired activities such as walking-partially met.  Functional outcome measure is improved to 34% or less, indicating decreasing functional limitations related to back pain-not met.     Plan  Plan details: DC to HEP.               Timed:  Manual Therapy:         mins  17641;  Therapeutic Exercise:    20     mins  55701;     Neuromuscular Ian:    10   mins  91030;    Therapeutic Activity:          mins  08682;     Gait Training:           mins  22385;     Ultrasound:          mins  11957;    Electrical Stimulation:         mins  66122 ( );    Untimed:  Electrical Stimulation:         mins  13193 ( );  Mechanical Traction:         mins  72498;     Timed Treatment:   30   mins   Total Treatment:     30   mins      Estela Lau PT  Physical Therapist

## 2022-06-10 ENCOUNTER — TELEPHONE (OUTPATIENT)
Dept: INTERNAL MEDICINE | Facility: CLINIC | Age: 72
End: 2022-06-10

## 2022-06-10 NOTE — TELEPHONE ENCOUNTER
Patience spouse (Elva) has clinical questions about her  health concerning OCVID. She would like a call back.

## 2022-06-10 NOTE — TELEPHONE ENCOUNTER
Pt tested positive for covid 19 today. Sx of cough, congestion, pt has gotten weak, unstable.no other Sx.   Spouse is concerned with the medications Pt take if they need to take additional steps to recovery.

## 2022-06-10 NOTE — TELEPHONE ENCOUNTER
On-call note: The patient's wife called at 5:09 PM tonight, since she had not heard back from the earlier message.  She states her  tested positive for COVID-19 today.  He has a history of Lewy body dementia.  He has been sick with cough and congestion for 2 days.  No fever.  She states yesterday he was confused, disoriented, and unsteady on his feet.  However, today he is back to his baseline.  She has questions regarding Paxlovid.    I informed the patient's wife he most likely is not a candidate for Paxlovid, especially since his symptoms are improving.  He is on maintenance Tylenol and Aleve, and I recommended he continue that.  I also recommended he add Mucinex and plenty of fluids.  I recommended she take him to the ED if he has any recurrence of his disorientation or unsteadiness, or any other worsening of his condition.    Verbalized understanding and agreement.   Satisfactory

## 2022-06-18 DIAGNOSIS — E78.49 OTHER HYPERLIPIDEMIA: ICD-10-CM

## 2022-06-20 RX ORDER — PRAVASTATIN SODIUM 20 MG
TABLET ORAL
Qty: 90 TABLET | Refills: 0 | Status: SHIPPED | OUTPATIENT
Start: 2022-06-20 | End: 2022-09-23

## 2022-06-20 NOTE — TELEPHONE ENCOUNTER
Rx Refill Note  Requested Prescriptions     Pending Prescriptions Disp Refills   • pravastatin (PRAVACHOL) 20 MG tablet [Pharmacy Med Name: Pravastatin Sodium 20 MG Oral Tablet] 90 tablet 0     Sig: Take 1 tablet by mouth once daily      Last office visit with prescribing clinician: 3/14/2022      Next office visit with prescribing clinician: 9/23/2022             Cary Pineda MA  06/20/22, 15:08 EDT

## 2022-08-22 ENCOUNTER — TELEMEDICINE (OUTPATIENT)
Dept: INTERNAL MEDICINE | Facility: CLINIC | Age: 72
End: 2022-08-22

## 2022-08-22 DIAGNOSIS — R25.2 LEG CRAMPS: ICD-10-CM

## 2022-08-22 DIAGNOSIS — J06.9 UPPER RESPIRATORY TRACT INFECTION, UNSPECIFIED TYPE: Primary | ICD-10-CM

## 2022-08-22 LAB
EXPIRATION DATE: NORMAL
FLUAV AG UPPER RESP QL IA.RAPID: NOT DETECTED
FLUBV AG UPPER RESP QL IA.RAPID: NOT DETECTED
INTERNAL CONTROL: NORMAL
Lab: NORMAL
SARS-COV-2 AG UPPER RESP QL IA.RAPID: NOT DETECTED

## 2022-08-22 PROCEDURE — U0004 COV-19 TEST NON-CDC HGH THRU: HCPCS | Performed by: NURSE PRACTITIONER

## 2022-08-22 PROCEDURE — 99213 OFFICE O/P EST LOW 20 MIN: CPT | Performed by: NURSE PRACTITIONER

## 2022-08-22 PROCEDURE — U0005 INFEC AGEN DETEC AMPLI PROBE: HCPCS | Performed by: NURSE PRACTITIONER

## 2022-08-22 PROCEDURE — 87428 SARSCOV & INF VIR A&B AG IA: CPT | Performed by: NURSE PRACTITIONER

## 2022-08-22 RX ORDER — DOXYCYCLINE HYCLATE 100 MG/1
100 CAPSULE ORAL 2 TIMES DAILY
Qty: 20 CAPSULE | Refills: 0 | Status: SHIPPED | OUTPATIENT
Start: 2022-08-22 | End: 2022-09-23

## 2022-08-22 RX ORDER — BENZONATATE 200 MG/1
200 CAPSULE ORAL 3 TIMES DAILY PRN
Qty: 30 CAPSULE | Refills: 0 | Status: SHIPPED | OUTPATIENT
Start: 2022-08-22 | End: 2022-09-23

## 2022-08-22 NOTE — PROGRESS NOTES
Chief Complaint  URI    Subjective          Emmanuel Bermudez presents to [facility]  History of Present Illness  This was an audio and video enabled telemedicine encounter.    You have chosen to receive care through a telehealth visit.  Do you consent to use a video/audio connection for your medical care today? Yes    Location provider home location patient home video    party includes patient provider and wife    Emmanuel Bermudez is a 76-year-old male who presents today via video visit for a new onset of cough.    The patient reports that his cough began approximately 3 days ago. He states that he had paroxysmal coughing that occur sporadically. The adult female states that the patient complained of nasal drainage last night. He denies any headache, fatigue, nasal congestion, sore throat, fever, chills, change to taste or smell, shortness of breath, chest pain, nausea, vomiting, or diarrhea. He denies having a productive cough. The adult female reports that the patient's grandsons have a cough, but they have not been tested for COVID-19. The adult female also reports that they are going on a trip on Sunday, 08/28/2022, and she does not want the patient to be sick because they will be gone for 1 week. The patient has not tried any over-the-counter or prescription medications for his symptoms. He denies any history of asthma, lung disease or pneumonia. The patient does not have an oxygen monitor at home.    The patient reports that he has had cramping in his legs. He states that his legs will lock and he has to grab behind his knee to pull it up until it bends. The patient has a history of chronic low back pain. He is seeing neurology and a movement specialist. He is taking Sinemet.    The patient is taking pravastatin for his cholesterol. His last chemistry panel in 07/2022 was normal. His magnesium and electrolytes were also normal.    The patient does not have any  allergies.    Objective   Vital Signs:   There were no vitals taken for this visit.    Physical Exam  Constitutional:       General: He is not in acute distress.     Appearance: Normal appearance. He is not ill-appearing.   HENT:      Mouth/Throat:      Pharynx: Oropharynx is clear.   Eyes:      General:         Right eye: No discharge.         Left eye: No discharge.      Conjunctiva/sclera: Conjunctivae normal.   Pulmonary:      Effort: Pulmonary effort is normal.   Skin:     Coloration: Skin is not pale.   Neurological:      Mental Status: He is alert and oriented to person, place, and time.   Psychiatric:         Mood and Affect: Mood normal.         Behavior: Behavior normal.         Thought Content: Thought content normal.         Judgment: Judgment normal.        Result Review :                 Assessment and Plan    Diagnoses and all orders for this visit:    1. Upper respiratory tract infection, unspecified type (Primary)  -     COVID-19 PCR, [facility], NP SWAB IN [facility]AL TRANSPORT MEDIA/ORAL SWISH 24-30 HR TAT - Swab, Nasopharynx; Future  -     POCT SARS-CoV-2 Antigen WOLF + Flu  -     benzonatate (TESSALON) 200 MG capsule; Take 1 capsule by mouth 3 (Three) Times a Day As Needed for Cough.  Dispense: 30 capsule; Refill: 0  -     COVID-19 PCR, [facility], NP SWAB IN [facility] TRANSPORT MEDIA/ORAL SWISH 24-30 HR TAT - Swab, Nasopharynx    2. Leg cramps    Other orders  -     doxycycline (VIBRAMYCIN) 100 MG capsule; Take 1 capsule by mouth 2 (Two) Times a Day.  Dispense: 20 capsule; Refill: 0     Upper respiratory symptoms  - The patient will be tested for COVID-19 today. If his symptoms do not improve by Friday, 08/25/2022, he will contact the office for an order for a chest x-ray.  - The patient was prescribed a cough medication.  - The patient was advised to monitor his oxygen levels at home.  - He was advised to go to the hospital if his oxygen levels are below 90 percent.     Leg cramps  - The  patient was advised to discontinue pravastatin for a couple of weeks to see if his symptoms improve. If his symptoms improve, his cholesterol medication may need to be changed.            Follow Up   Return if symptoms worsen or fail to improve.  Patient was given instructions and counseling regarding his condition or for health maintenance advice. Please see specific information pulled into the AVS if appropriate.     RTC/call  If symptoms worsen  Meds MOA and SE's reviewed and pt v/u    Video visit 13 mintues    SADE Rodriguez [facility]  [address] [address]  Fax [phone]  Phone [phone]    Transcribed from ambient dictation for SADE Rodriguez by Cary Bourne.  08/22/22   18:36 EDT    Patient verbalized consent to the visit recording.

## 2022-08-23 LAB — SARS-COV-2 RNA NOSE QL NAA+PROBE: NOT DETECTED

## 2022-09-23 ENCOUNTER — OFFICE VISIT (OUTPATIENT)
Dept: INTERNAL MEDICINE | Facility: CLINIC | Age: 72
End: 2022-09-23

## 2022-09-23 VITALS
TEMPERATURE: 97.3 F | RESPIRATION RATE: 16 BRPM | OXYGEN SATURATION: 99 % | SYSTOLIC BLOOD PRESSURE: 118 MMHG | DIASTOLIC BLOOD PRESSURE: 66 MMHG | BODY MASS INDEX: 26.84 KG/M2 | HEART RATE: 67 BPM | HEIGHT: 66 IN | WEIGHT: 167 LBS

## 2022-09-23 DIAGNOSIS — G20 PARKINSON DISEASE: ICD-10-CM

## 2022-09-23 DIAGNOSIS — Z00.00 MEDICARE ANNUAL WELLNESS VISIT, SUBSEQUENT: Primary | ICD-10-CM

## 2022-09-23 DIAGNOSIS — E78.49 OTHER HYPERLIPIDEMIA: ICD-10-CM

## 2022-09-23 DIAGNOSIS — E55.9 VITAMIN D DEFICIENCY: ICD-10-CM

## 2022-09-23 PROBLEM — G20.A1 PARKINSON DISEASE: Status: ACTIVE | Noted: 2022-09-23

## 2022-09-23 PROCEDURE — 1159F MED LIST DOCD IN RCRD: CPT | Performed by: STUDENT IN AN ORGANIZED HEALTH CARE EDUCATION/TRAINING PROGRAM

## 2022-09-23 PROCEDURE — 99214 OFFICE O/P EST MOD 30 MIN: CPT | Performed by: STUDENT IN AN ORGANIZED HEALTH CARE EDUCATION/TRAINING PROGRAM

## 2022-09-23 PROCEDURE — 36415 COLL VENOUS BLD VENIPUNCTURE: CPT | Performed by: STUDENT IN AN ORGANIZED HEALTH CARE EDUCATION/TRAINING PROGRAM

## 2022-09-23 PROCEDURE — 84100 ASSAY OF PHOSPHORUS: CPT | Performed by: STUDENT IN AN ORGANIZED HEALTH CARE EDUCATION/TRAINING PROGRAM

## 2022-09-23 PROCEDURE — 82306 VITAMIN D 25 HYDROXY: CPT | Performed by: STUDENT IN AN ORGANIZED HEALTH CARE EDUCATION/TRAINING PROGRAM

## 2022-09-23 PROCEDURE — 83735 ASSAY OF MAGNESIUM: CPT | Performed by: STUDENT IN AN ORGANIZED HEALTH CARE EDUCATION/TRAINING PROGRAM

## 2022-09-23 PROCEDURE — 85025 COMPLETE CBC W/AUTO DIFF WBC: CPT | Performed by: STUDENT IN AN ORGANIZED HEALTH CARE EDUCATION/TRAINING PROGRAM

## 2022-09-23 PROCEDURE — 80053 COMPREHEN METABOLIC PANEL: CPT | Performed by: STUDENT IN AN ORGANIZED HEALTH CARE EDUCATION/TRAINING PROGRAM

## 2022-09-23 PROCEDURE — 82330 ASSAY OF CALCIUM: CPT | Performed by: STUDENT IN AN ORGANIZED HEALTH CARE EDUCATION/TRAINING PROGRAM

## 2022-09-23 PROCEDURE — 80061 LIPID PANEL: CPT | Performed by: STUDENT IN AN ORGANIZED HEALTH CARE EDUCATION/TRAINING PROGRAM

## 2022-09-23 PROCEDURE — 1170F FXNL STATUS ASSESSED: CPT | Performed by: STUDENT IN AN ORGANIZED HEALTH CARE EDUCATION/TRAINING PROGRAM

## 2022-09-23 PROCEDURE — G0439 PPPS, SUBSEQ VISIT: HCPCS | Performed by: STUDENT IN AN ORGANIZED HEALTH CARE EDUCATION/TRAINING PROGRAM

## 2022-09-23 RX ORDER — DULOXETIN HYDROCHLORIDE 30 MG/1
30 CAPSULE, DELAYED RELEASE ORAL DAILY
Qty: 90 CAPSULE | Refills: 3 | Status: SHIPPED | OUTPATIENT
Start: 2022-09-23

## 2022-09-23 RX ORDER — ROSUVASTATIN CALCIUM 20 MG/1
20 TABLET, COATED ORAL NIGHTLY
Qty: 90 TABLET | Refills: 3 | Status: SHIPPED | OUTPATIENT
Start: 2022-09-23

## 2022-09-23 NOTE — PROGRESS NOTES
The ABCs of the Annual Wellness Visit  Subsequent Medicare Wellness Visit    Chief Complaint   Patient presents with   • Back Pain     Lower back       Subjective    History of Present Illness:  Emmanuel Bermudez is a 72 y.o. male who presents for a Subsequent Medicare Wellness Visit.    The following portions of the patient's history were reviewed and   updated as appropriate: allergies, current medications, past family history, past medical history, past social history, past surgical history and problem list.    Compared to one year ago, the patient feels his physical   health is worse.    Compared to one year ago, the patient feels his mental   health is the same.    Recent Hospitalizations:  He was not admitted to the hospital during the last year.     Hyperlipidemia  Muscle Cramping  - notes several months of symptoms  - states that he sometimes has to stand up because his leg locks in place  - states that it releases spontaneously  - doesn't happen as frequently while moving  - Patient and wife were previously concerned about interaction with statin, but self discontinued pravastatin and there was no improvement of symptoms over the last several weeks        Current Medical Providers:  Patient Care Team:  Carson Mcadams MD as PCP - General (Family Medicine)  Neelam Arthur APRN (Nurse Practitioner)    Outpatient Medications Prior to Visit   Medication Sig Dispense Refill   • acetaminophen (TYLENOL) 650 MG 8 hr tablet Take 650 mg by mouth Every 8 (Eight) Hours As Needed.     • carbidopa-levodopa (SINEMET)  MG per tablet Take 2 tablets by mouth 3 (Three) Times a Day.     • donepezil (ARICEPT) 10 MG tablet Take 1 tablet by mouth 2 (Two) Times a Day. 180 tablet 3   • memantine (NAMENDA) 10 MG tablet Take 1 tablet by mouth 2 (Two) Times a Day. 180 tablet 3   • Cholecalciferol 125 MCG (5000 UT) tablet Take 1 tablet by mouth Daily. 90 tablet 1   • doxycycline (VIBRAMYCIN) 100 MG capsule Take 1 capsule by  mouth 2 (Two) Times a Day. 20 capsule 0   • benzonatate (TESSALON) 200 MG capsule Take 1 capsule by mouth 3 (Three) Times a Day As Needed for Cough. 30 capsule 0   • citalopram (CeleXA) 10 MG tablet Take 1 tablet by mouth Daily. 90 tablet 3   • pravastatin (PRAVACHOL) 20 MG tablet Take 1 tablet by mouth once daily 90 tablet 0     No facility-administered medications prior to visit.       No opioid medication identified on active medication list. I have reviewed chart for other potential  high risk medication/s and harmful drug interactions in the elderly.          Aspirin is not on active medication list.  Aspirin use is not indicated based on review of current medical condition/s. Risk of harm outweighs potential benefits.  .    Patient Active Problem List   Diagnosis   • Lewy body dementia without behavioral disturbance (HCC)   • Vitamin D deficiency   • Other hyperlipidemia   • Chronic bilateral low back pain without sciatica   • Cognitive communication deficit   • Sciatica   • Minor neurocognitive disorder   • Parkinson disease (HCC)     Advance Care Planning  Advance Directive is on file.  ACP discussion was held with the patient during this visit. Patient has an advance directive in EMR which is still valid.     Review of Systems   Constitutional: Negative for activity change, appetite change, fatigue and fever.   HENT: Negative for congestion, postnasal drip and rhinorrhea.    Respiratory: Negative for chest tightness, shortness of breath and wheezing.    Cardiovascular: Negative for chest pain and palpitations.   Gastrointestinal: Negative for abdominal distention, abdominal pain, blood in stool, constipation and diarrhea.   Genitourinary: Negative for difficulty urinating and hematuria.   Musculoskeletal: Positive for myalgias. Negative for arthralgias and back pain.   Skin: Negative for rash and wound.   Neurological: Negative for dizziness and confusion.   Psychiatric/Behavioral: Negative for self-injury  "and suicidal ideas. The patient is not nervous/anxious.         Objective    Vitals:    09/23/22 1050   BP: 118/66   BP Location: Right arm   Patient Position: Sitting   Cuff Size: Adult   Pulse: 67   Resp: 16   Temp: 97.3 °F (36.3 °C)   TempSrc: Temporal   SpO2: 99%   Weight: 75.8 kg (167 lb)   Height: 167.6 cm (66\")   PainSc:   8   PainLoc: Back     Estimated body mass index is 26.95 kg/m² as calculated from the following:    Height as of this encounter: 167.6 cm (66\").    Weight as of this encounter: 75.8 kg (167 lb).    BMI is >= 25 and <30. (Overweight) The following options were offered after discussion;: exercise counseling/recommendations and nutrition counseling/recommendations      Does the patient have evidence of cognitive impairment? Yes    Physical Exam  Vitals and nursing note reviewed.   Constitutional:       General: He is not in acute distress.     Appearance: Normal appearance. He is normal weight. He is not ill-appearing or toxic-appearing.   HENT:      Nose: No congestion or rhinorrhea.   Eyes:      General:         Right eye: No discharge.         Left eye: No discharge.      Conjunctiva/sclera: Conjunctivae normal.   Cardiovascular:      Rate and Rhythm: Normal rate and regular rhythm.      Heart sounds: Normal heart sounds. No murmur heard.    No friction rub.   Pulmonary:      Effort: Pulmonary effort is normal. No respiratory distress.      Breath sounds: Normal breath sounds. No wheezing or rhonchi.   Abdominal:      General: Abdomen is flat. Bowel sounds are normal. There is no distension.      Palpations: Abdomen is soft. There is no mass.      Tenderness: There is no abdominal tenderness. There is no guarding or rebound.   Musculoskeletal:      Cervical back: Normal range of motion.      Right lower leg: No edema.      Left lower leg: No edema.   Skin:     Findings: No lesion or rash.   Neurological:      General: No focal deficit present.      Mental Status: He is alert. Mental status " is at baseline.      Coordination: Coordination normal.      Gait: Gait normal.   Psychiatric:         Mood and Affect: Mood normal.         Behavior: Behavior normal.         Thought Content: Thought content normal.         Judgment: Judgment normal.       Lab Results   Component Value Date     (H) 07/15/2022            HEALTH RISK ASSESSMENT    Smoking Status:  Social History     Tobacco Use   Smoking Status Never Smoker   Smokeless Tobacco Never Used     Alcohol Consumption:  Social History     Substance and Sexual Activity   Alcohol Use Never     Fall Risk Screen:    STEADI Fall Risk Assessment was completed, and patient is at LOW risk for falls.Assessment completed on:9/23/2022    Depression Screening:  PHQ-2/PHQ-9 Depression Screening 3/14/2022   Little Interest or Pleasure in Doing Things 0-->not at all   Feeling Down, Depressed or Hopeless 0-->not at all   PHQ-9: Brief Depression Severity Measure Score 0       Health Habits and Functional and Cognitive Screening:  Functional & Cognitive Status 9/23/2022   Do you have difficulty preparing food and eating? No   Do you have difficulty bathing yourself, getting dressed or grooming yourself? No   Do you have difficulty using the toilet? No   Do you have difficulty moving around from place to place? Yes   Do you have trouble with steps or getting out of a bed or a chair? Yes   Current Diet Other   Dental Exam Up to date   Eye Exam Up to date   Exercise (times per week) 5 times per week   Current Exercises Include No Regular Exercise   Do you need help using the phone?  Yes   Are you deaf or do you have serious difficulty hearing?  No   Do you need help with transportation? Yes   Do you need help shopping? No   Do you need help preparing meals?  No   Do you need help with housework?  No   Do you need help with laundry? No   Do you need help taking your medications? No   Do you need help managing money? No   Do you ever drive or ride in a car without wearing  a seat belt? No   Have you felt unusual stress, anger or loneliness in the last month? No   Who do you live with? Spouse   If you need help, do you have trouble finding someone available to you? No   Have you been bothered in the last four weeks by sexual problems? No   Do you have difficulty concentrating, remembering or making decisions? Yes       Age-appropriate Screening Schedule:  Refer to the list below for future screening recommendations based on patient's age, sex and/or medical conditions. Orders for these recommended tests are listed in the plan section. The patient has been provided with a written plan.    Health Maintenance   Topic Date Due   • ZOSTER VACCINE (1 of 2) Never done   • LIPID PANEL  09/13/2022   • INFLUENZA VACCINE  10/01/2022   • TDAP/TD VACCINES (2 - Tdap) 09/06/2026              Assessment & Plan   CMS Preventative Services Quick Reference  Risk Factors Identified During Encounter  Dementia/Memory   Depression/Dysphoria     The above risks/problems have been discussed with the patient.  Follow up actions/plans if indicated are seen below in the Assessment/Plan Section.  Pertinent information has been shared with the patient in the After Visit Summary.    Diagnoses and all orders for this visit:    1. Medicare annual wellness visit, subsequent (Primary)  -     Code Status and Medical Interventions:    2. Other hyperlipidemia  Assessment & Plan:  Lipid abnormalities are worsening.  The 10-year ASCVD risk score (Collinsmazin OTERO Jr., et al., 2013) is: 23.9%    Values used to calculate the score:      Age: 72 years      Sex: Male      Is Non- : No      Diabetic: No      Tobacco smoker: No      Systolic Blood Pressure: 118 mmHg      Is BP treated: No      HDL Cholesterol: 29 mg/dL      Total Cholesterol: 290 mg/dL    Nutritional counseling was provided. and Pharmacotherapy as ordered.   -   Discontinued on 9/23/2022: Pravastatin due to inadequate risk medication with ASCVD  calculator as noted above  -   Initiate 9/23/2022: Rosuvastatin 20 mg nightly  Lipids will be reassessed in 1 year.    Orders:  -     rosuvastatin (Crestor) 20 MG tablet; Take 1 tablet by mouth Every Night.  Dispense: 90 tablet; Refill: 3  -     Lipid Panel; Future  -     Lipid Panel    3. Parkinson disease (HCC)  Assessment & Plan:  -   Patient with chronic history of Parkinson disease with associated Lewy body dementia prior to clinical exam on 9/23/2022  -   Patient currently tolerating carbidopa/levodopa along with donepezil/memantine as initiated by neurologist  -   Currently completing [facility] the [facility]  -   Patient with likely associated muscle cramping that is worsening prior to clinical exam on 9/23/2022  Plan:  -   Additional testing completed as noted below to identify secondary causes of muscle cramping  -   In the event of negative/within normal limits testing of laboratory work-up as noted below, consider coordination with neurology for dosage adjustment of current medication regimen to optimize symptom profile  -   Family declined referral to case management, home health, or home physical therapy for additional resources at this time    Orders:  -     Comprehensive Metabolic Panel; Future  -     CBC Auto Differential; Future  -     Magnesium; Future  -     Phosphorus; Future  -     Calcium, Ionized; Future  -     Comprehensive Metabolic Panel  -     Calcium, Ionized  -     Phosphorus  -     Magnesium  -     CBC Auto Differential    4. Vitamin D deficiency  -     Vitamin D 25 hydroxy; Future  -     Vitamin D 25 hydroxy    Other orders  -     DULoxetine (CYMBALTA) 30 MG capsule; Take 1 capsule by mouth Daily.  Dispense: 90 capsule; Refill: 3      Follow Up:   Return in about 3 months (around 12/23/2022) for Recheck.     An After Visit Summary and PPPS were made available to the patient.    Carson Mcadams MD    Return in about 3 months (around 12/23/2022) for  Recheck.

## 2022-09-23 NOTE — ASSESSMENT & PLAN NOTE
Lipid abnormalities are worsening.  The 10-year ASCVD risk score (Collins OTERO Jr., et al., 2013) is: 23.9%    Values used to calculate the score:      Age: 72 years      Sex: Male      Is Non- : No      Diabetic: No      Tobacco smoker: No      Systolic Blood Pressure: 118 mmHg      Is BP treated: No      HDL Cholesterol: 29 mg/dL      Total Cholesterol: 290 mg/dL    Nutritional counseling was provided. and Pharmacotherapy as ordered.   -   Discontinued on 9/23/2022: Pravastatin due to inadequate risk medication with ASCVD calculator as noted above  -   Initiate 9/23/2022: Rosuvastatin 20 mg nightly  Lipids will be reassessed in 1 year.

## 2022-09-23 NOTE — PATIENT INSTRUCTIONS

## 2022-09-23 NOTE — ASSESSMENT & PLAN NOTE
-   Patient with chronic history of Parkinson disease with associated Lewy body dementia prior to clinical exam on 9/23/2022  -   Patient currently tolerating carbidopa/levodopa along with donepezil/memantine as initiated by neurologist  -   Currently completing movement therapy through the [facility]  -   Patient with likely associated muscle cramping that is worsening prior to clinical exam on 9/23/2022  Plan:  -   Additional testing completed as noted below to identify secondary causes of muscle cramping  -   In the event of negative/within normal limits testing of laboratory work-up as noted below, consider coordination with neurology for dosage adjustment of current medication regimen to optimize symptom profile  -   Family declined referral to case management, home health, or home physical therapy for additional resources at this time

## 2022-09-24 LAB
25(OH)D3 SERPL-MCNC: 54.4 NG/ML (ref 30–100)
ALBUMIN SERPL-MCNC: 4.4 G/DL (ref 3.5–5.2)
ALBUMIN/GLOB SERPL: 1.9 G/DL
ALP SERPL-CCNC: 75 U/L (ref 39–117)
ALT SERPL W P-5'-P-CCNC: 8 U/L (ref 1–41)
ANION GAP SERPL CALCULATED.3IONS-SCNC: 9 MMOL/L (ref 5–15)
AST SERPL-CCNC: 18 U/L (ref 1–40)
BASOPHILS # BLD AUTO: 0.04 10*3/MM3 (ref 0–0.2)
BASOPHILS NFR BLD AUTO: 0.4 % (ref 0–1.5)
BILIRUB SERPL-MCNC: 0.3 MG/DL (ref 0–1.2)
BUN SERPL-MCNC: 20 MG/DL (ref 8–23)
BUN/CREAT SERPL: 22.5 (ref 7–25)
CALCIUM SPEC-SCNC: 10.1 MG/DL (ref 8.6–10.5)
CHLORIDE SERPL-SCNC: 104 MMOL/L (ref 98–107)
CHOLEST SERPL-MCNC: 219 MG/DL (ref 0–200)
CO2 SERPL-SCNC: 26 MMOL/L (ref 22–29)
CREAT SERPL-MCNC: 0.89 MG/DL (ref 0.76–1.27)
DEPRECATED RDW RBC AUTO: 44.2 FL (ref 37–54)
EGFRCR SERPLBLD CKD-EPI 2021: 92.2 ML/MIN/1.73
EOSINOPHIL # BLD AUTO: 0.09 10*3/MM3 (ref 0–0.4)
EOSINOPHIL NFR BLD AUTO: 1 % (ref 0.3–6.2)
ERYTHROCYTE [DISTWIDTH] IN BLOOD BY AUTOMATED COUNT: 12.7 % (ref 12.3–15.4)
GLOBULIN UR ELPH-MCNC: 2.3 GM/DL
GLUCOSE SERPL-MCNC: 96 MG/DL (ref 65–99)
HCT VFR BLD AUTO: 39.1 % (ref 37.5–51)
HDLC SERPL-MCNC: 36 MG/DL (ref 40–60)
HGB BLD-MCNC: 13.6 G/DL (ref 13–17.7)
IMM GRANULOCYTES # BLD AUTO: 0.02 10*3/MM3 (ref 0–0.05)
IMM GRANULOCYTES NFR BLD AUTO: 0.2 % (ref 0–0.5)
LDLC SERPL CALC-MCNC: 126 MG/DL (ref 0–100)
LDLC/HDLC SERPL: 3.31 {RATIO}
LYMPHOCYTES # BLD AUTO: 2.18 10*3/MM3 (ref 0.7–3.1)
LYMPHOCYTES NFR BLD AUTO: 23.6 % (ref 19.6–45.3)
MAGNESIUM SERPL-MCNC: 2.2 MG/DL (ref 1.6–2.4)
MCH RBC QN AUTO: 33.3 PG (ref 26.6–33)
MCHC RBC AUTO-ENTMCNC: 34.8 G/DL (ref 31.5–35.7)
MCV RBC AUTO: 95.6 FL (ref 79–97)
MONOCYTES # BLD AUTO: 0.82 10*3/MM3 (ref 0.1–0.9)
MONOCYTES NFR BLD AUTO: 8.9 % (ref 5–12)
NEUTROPHILS NFR BLD AUTO: 6.07 10*3/MM3 (ref 1.7–7)
NEUTROPHILS NFR BLD AUTO: 65.9 % (ref 42.7–76)
NRBC BLD AUTO-RTO: 0 /100 WBC (ref 0–0.2)
PHOSPHATE SERPL-MCNC: 2.8 MG/DL (ref 2.5–4.5)
PLATELET # BLD AUTO: 192 10*3/MM3 (ref 140–450)
PMV BLD AUTO: 11.4 FL (ref 6–12)
POTASSIUM SERPL-SCNC: 4.8 MMOL/L (ref 3.5–5.2)
PROT SERPL-MCNC: 6.7 G/DL (ref 6–8.5)
RBC # BLD AUTO: 4.09 10*6/MM3 (ref 4.14–5.8)
SODIUM SERPL-SCNC: 139 MMOL/L (ref 136–145)
TRIGL SERPL-MCNC: 319 MG/DL (ref 0–150)
VLDLC SERPL-MCNC: 57 MG/DL (ref 5–40)
WBC NRBC COR # BLD: 9.22 10*3/MM3 (ref 3.4–10.8)

## 2022-09-27 LAB — CA-I SERPL ISE-MCNC: NORMAL MG/DL

## 2023-01-09 ENCOUNTER — HOME HEALTH ADMISSION (OUTPATIENT)
Dept: HOME HEALTH SERVICES | Facility: HOME HEALTHCARE | Age: 73
End: 2023-01-09
Payer: MEDICARE

## 2023-01-09 ENCOUNTER — OFFICE VISIT (OUTPATIENT)
Dept: INTERNAL MEDICINE | Facility: CLINIC | Age: 73
End: 2023-01-09
Payer: MEDICARE

## 2023-01-09 ENCOUNTER — TELEPHONE (OUTPATIENT)
Dept: INTERNAL MEDICINE | Facility: CLINIC | Age: 73
End: 2023-01-09

## 2023-01-09 VITALS
BODY MASS INDEX: 25.89 KG/M2 | SYSTOLIC BLOOD PRESSURE: 118 MMHG | WEIGHT: 160.4 LBS | DIASTOLIC BLOOD PRESSURE: 68 MMHG | TEMPERATURE: 98 F | RESPIRATION RATE: 16 BRPM

## 2023-01-09 DIAGNOSIS — M54.42 CHRONIC BILATERAL LOW BACK PAIN WITH BILATERAL SCIATICA: ICD-10-CM

## 2023-01-09 DIAGNOSIS — G89.29 CHRONIC BILATERAL LOW BACK PAIN WITH BILATERAL SCIATICA: ICD-10-CM

## 2023-01-09 DIAGNOSIS — M54.41 CHRONIC BILATERAL LOW BACK PAIN WITH BILATERAL SCIATICA: ICD-10-CM

## 2023-01-09 DIAGNOSIS — G20 PARKINSON DISEASE: Primary | ICD-10-CM

## 2023-01-09 PROCEDURE — 99214 OFFICE O/P EST MOD 30 MIN: CPT | Performed by: STUDENT IN AN ORGANIZED HEALTH CARE EDUCATION/TRAINING PROGRAM

## 2023-01-09 RX ORDER — CARBIDOPA AND LEVODOPA 25; 100 MG/1; MG/1
TABLET, EXTENDED RELEASE ORAL
COMMUNITY
Start: 2022-12-31

## 2023-01-09 NOTE — TELEPHONE ENCOUNTER
Per [facility] we are unable to accept at this due to staffing volume.    Please find another agency and send message back to me so I can fax

## 2023-01-09 NOTE — PROGRESS NOTES
Follow Up Office Visit      Date: 2023   Patient Name: Emmanuel Bermudez  : 1950   MRN: [id number]     Chief Complaint:    Chief Complaint   Patient presents with   • Hyperlipidemia     fu   • Balance Issues       History of Present Illness: Emmanuel Bermudez is a 72 y.o. male who is here today to follow up with with his Parkinson's. He is accompanied by a female.     Parkinsons  The patient states he has not seen anyone for his symptoms. He states his balance is becoming more unstable. His is unsure if the memantine and donepezil is working for his memory and daily function because he does not know what it would be like if he was not taking it. The female says she does not notice any side effects in the medications for the patient. He states he does not have depression, hopelessness or a lost in interest but he states he is frustrated with things he can no longer do such as driving or doing activities with his grandson. He states he uses his tonio to help him with his outlook on things. The patient is taking duloxetine and states it is helping his back. He states he does not get much movement and know he needs to exercise more. The female inquired about a new dementia medication she saw on TV. The patient was also questioning the rate of decline that happens with dementia.     Vaccinations  The patient declines any COVID-19 vaccinations today.   Subjective      Review of Systems:   Review of Systems   Constitutional: Negative for activity change, appetite change, fatigue and fever.   Eyes: Negative for blurred vision, photophobia and visual disturbance.   Respiratory: Negative for cough, chest tightness and shortness of breath.    Cardiovascular: Negative for chest pain and palpitations.   Gastrointestinal: Negative for abdominal distention, abdominal pain, blood in stool, constipation, diarrhea, nausea and vomiting.   Genitourinary: Negative for dysuria and hematuria.   Musculoskeletal: Negative for  arthralgias, back pain and joint swelling.   Skin: Negative for rash and wound.   Neurological: Positive for weakness. Negative for headache and confusion.       I have reviewed the patients family history, social history, past medical history, past surgical history and have updated it as appropriate.     Medications:     Current Outpatient Medications:   •  acetaminophen (TYLENOL) 650 MG 8 hr tablet, Take 650 mg by mouth Every 8 (Eight) Hours As Needed., Disp: , Rfl:   •  carbidopa-levodopa (SINEMET)  MG per tablet, Take 2 tablets by mouth 3 (Three) Times a Day., Disp: , Rfl:   •  carbidopa-levodopa ER (SINEMET CR)  MG per tablet, START WITH 1 TABLET AT NIGHT BEFORE BED. IF NEEDED AFTER 1 WEEK, MAY INCREASE TO 2 TABLETS AT NIGHT (THIS IS IN ADDTIION TO 3 TIMES A DAY LEVODOPA YOU ARE ALREADY TAKING) DO NOT CRUSH, CHEW, OR SPLIT THIS TABLET, Disp: , Rfl:   •  donepezil (ARICEPT) 10 MG tablet, Take 1 tablet by mouth 2 (Two) Times a Day., Disp: 180 tablet, Rfl: 3  •  DULoxetine (CYMBALTA) 30 MG capsule, Take 1 capsule by mouth Daily., Disp: 90 capsule, Rfl: 3  •  memantine (NAMENDA) 10 MG tablet, Take 1 tablet by mouth 2 (Two) Times a Day., Disp: 180 tablet, Rfl: 3  •  rosuvastatin (Crestor) 20 MG tablet, Take 1 tablet by mouth Every Night., Disp: 90 tablet, Rfl: 3    Allergies:   No Known Allergies    Objective     Physical Exam: Please see above  Vital Signs:   Vitals:    01/09/23 0945   BP: 118/68   BP Location: Right arm   Patient Position: Sitting   Cuff Size: Adult   Resp: 16   Temp: 98 °F (36.7 °C)   TempSrc: Temporal   Weight: 72.8 kg (160 lb 6.4 oz)   PainSc: 0-No pain     Body mass index is 25.89 kg/m².       Physical Exam  Vitals and nursing note reviewed.   Constitutional:       General: He is not in acute distress.     Appearance: Normal appearance. He is normal weight. He is not ill-appearing or toxic-appearing.   HENT:      Nose: No congestion or rhinorrhea.   Eyes:      General:          Right eye: No discharge.         Left eye: No discharge.      Conjunctiva/sclera: Conjunctivae normal.   Pulmonary:      Effort: Pulmonary effort is normal. No respiratory distress.   Abdominal:      General: Abdomen is flat. There is no distension.   Musculoskeletal:      Cervical back: Normal range of motion.   Skin:     Coloration: Skin is not jaundiced.      Findings: No rash.   Neurological:      General: No focal deficit present.      Mental Status: He is alert. Mental status is at baseline.      Coordination: Coordination normal.      Gait: Gait normal.   Psychiatric:         Mood and Affect: Mood normal.         Behavior: Behavior normal.         Thought Content: Thought content normal.         Judgment: Judgment normal.         Procedures    Results:   Labs:   No results found for: HGBA1C, CMP, CBCDIFFPANEL, CREAT, TSH     Imaging:   No valid procedures specified.     Assessment / Plan      Assessment/Plan:   Problem List Items Addressed This Visit        Musculoskeletal and Injuries    Chronic bilateral low back pain with bilateral sciatica    Relevant Orders    Ambulatory Referral to Home Health       Neuro    Parkinson disease (HCC) - Primary    Current Assessment & Plan     I will write a referral for in home physical therapy.          Relevant Medications    carbidopa-levodopa ER (SINEMET CR)  MG per tablet    Other Relevant Orders    Ambulatory Referral to Home Health         Follow Up:   Return in about 35 weeks (around 9/11/2023) for Annual.    I spent 30 minutes caring for Edward on this date of service. This time includes time spent by me in the following activities: preparing for the visit, reviewing tests, obtaining and/or reviewing a separately obtained history, performing a medically appropriate examination and/or evaluation, counseling and educating the patient/family/caregiver, ordering medications, tests, or procedures, referring and communicating with other health care professionals,  documenting information in the medical record, independently interpreting results and communicating that information with the patient/family/caregiver and care coordination.      Transcribed from ambient dictation for Carson Mcadams MD by Loan Macdonald.  01/09/23   12:36 EST    Patient or patient representative verbalized consent to the visit recording.  I have personally performed the services described in this document as transcribed by the above individual, and it is both accurate and complete.        Carson Mcadams MD  [facility]

## 2023-01-17 NOTE — TELEPHONE ENCOUNTER
Seymour Portillo, Dannielle Stark, we started care today of Emmanuel Bermudez. Thanks for the referral.

## 2023-03-13 ENCOUNTER — TELEPHONE (OUTPATIENT)
Dept: INTERNAL MEDICINE | Facility: CLINIC | Age: 73
End: 2023-03-13
Payer: MEDICARE

## 2023-03-13 NOTE — TELEPHONE ENCOUNTER
MARK Johnson () called to see if she can get an order for Speech Therapy. Gave Elizabeth verbal approval. They will fax form with all the information.

## 2023-03-16 ENCOUNTER — TELEPHONE (OUTPATIENT)
Dept: INTERNAL MEDICINE | Facility: CLINIC | Age: 73
End: 2023-03-16
Payer: MEDICARE

## 2023-03-16 NOTE — TELEPHONE ENCOUNTER
Caller: [facility]    Relationship: Home Health    Best call back number: [phone]    CONTINUE PHYSICAL THERAPY ONCE A WEEK FOR 6WKS.  FOR STRENGTH AND BALANCE    PLEASE CALL WITH VERBAL ORDER

## 2023-03-22 ENCOUNTER — OUTSIDE FACILITY SERVICE (OUTPATIENT)
Dept: INTERNAL MEDICINE | Facility: CLINIC | Age: 73
End: 2023-03-22
Payer: MEDICARE

## 2023-05-18 ENCOUNTER — OUTSIDE FACILITY SERVICE (OUTPATIENT)
Dept: INTERNAL MEDICINE | Facility: CLINIC | Age: 73
End: 2023-05-18
Payer: MEDICARE

## 2023-09-05 DIAGNOSIS — E78.49 OTHER HYPERLIPIDEMIA: ICD-10-CM

## 2023-09-05 RX ORDER — ROSUVASTATIN CALCIUM 20 MG/1
TABLET, COATED ORAL
Qty: 90 TABLET | Refills: 0 | Status: SHIPPED | OUTPATIENT
Start: 2023-09-05

## 2023-09-08 RX ORDER — DULOXETIN HYDROCHLORIDE 30 MG/1
30 CAPSULE, DELAYED RELEASE ORAL DAILY
Qty: 90 CAPSULE | Refills: 0 | Status: SHIPPED | OUTPATIENT
Start: 2023-09-08

## 2023-09-12 ENCOUNTER — OFFICE VISIT (OUTPATIENT)
Dept: INTERNAL MEDICINE | Facility: CLINIC | Age: 73
End: 2023-09-12
Payer: MEDICARE

## 2023-09-12 ENCOUNTER — LAB (OUTPATIENT)
Dept: LAB | Facility: HOSPITAL | Age: 73
End: 2023-09-12
Payer: MEDICARE

## 2023-09-12 VITALS
HEIGHT: 66 IN | TEMPERATURE: 98 F | BODY MASS INDEX: 25.58 KG/M2 | RESPIRATION RATE: 16 BRPM | HEART RATE: 74 BPM | SYSTOLIC BLOOD PRESSURE: 98 MMHG | DIASTOLIC BLOOD PRESSURE: 68 MMHG | WEIGHT: 159.2 LBS

## 2023-09-12 DIAGNOSIS — M54.42 CHRONIC BILATERAL LOW BACK PAIN WITH BILATERAL SCIATICA: ICD-10-CM

## 2023-09-12 DIAGNOSIS — K59.01 SLOW TRANSIT CONSTIPATION: ICD-10-CM

## 2023-09-12 DIAGNOSIS — E78.00 PURE HYPERCHOLESTEROLEMIA: ICD-10-CM

## 2023-09-12 DIAGNOSIS — G89.29 CHRONIC BILATERAL LOW BACK PAIN WITH BILATERAL SCIATICA: ICD-10-CM

## 2023-09-12 DIAGNOSIS — G47.01 INSOMNIA DUE TO MEDICAL CONDITION: ICD-10-CM

## 2023-09-12 DIAGNOSIS — F02.80 LEWY BODY DEMENTIA WITHOUT BEHAVIORAL DISTURBANCE: ICD-10-CM

## 2023-09-12 DIAGNOSIS — M54.41 CHRONIC BILATERAL LOW BACK PAIN WITH BILATERAL SCIATICA: ICD-10-CM

## 2023-09-12 DIAGNOSIS — Z23 ENCOUNTER FOR VACCINATION: ICD-10-CM

## 2023-09-12 DIAGNOSIS — G20 PARKINSON DISEASE: ICD-10-CM

## 2023-09-12 DIAGNOSIS — G31.83 LEWY BODY DEMENTIA WITHOUT BEHAVIORAL DISTURBANCE: ICD-10-CM

## 2023-09-12 DIAGNOSIS — Z12.11 SCREEN FOR COLON CANCER: ICD-10-CM

## 2023-09-12 DIAGNOSIS — Z00.00 MEDICARE ANNUAL WELLNESS VISIT, SUBSEQUENT: Primary | ICD-10-CM

## 2023-09-12 LAB
ALBUMIN SERPL-MCNC: 4.8 G/DL (ref 3.5–5.2)
ALBUMIN/GLOB SERPL: 2.1 G/DL
ALP SERPL-CCNC: 80 U/L (ref 39–117)
ALT SERPL W P-5'-P-CCNC: 11 U/L (ref 1–41)
ANION GAP SERPL CALCULATED.3IONS-SCNC: 11 MMOL/L (ref 5–15)
AST SERPL-CCNC: 28 U/L (ref 1–40)
BILIRUB SERPL-MCNC: 0.3 MG/DL (ref 0–1.2)
BUN SERPL-MCNC: 20 MG/DL (ref 8–23)
BUN/CREAT SERPL: 19 (ref 7–25)
CALCIUM SPEC-SCNC: 10.7 MG/DL (ref 8.6–10.5)
CHLORIDE SERPL-SCNC: 105 MMOL/L (ref 98–107)
CHOLEST SERPL-MCNC: 138 MG/DL (ref 0–200)
CO2 SERPL-SCNC: 27 MMOL/L (ref 22–29)
CREAT SERPL-MCNC: 1.05 MG/DL (ref 0.76–1.27)
EGFRCR SERPLBLD CKD-EPI 2021: 75.9 ML/MIN/1.73
GLOBULIN UR ELPH-MCNC: 2.3 GM/DL
GLUCOSE SERPL-MCNC: 95 MG/DL (ref 65–99)
HDLC SERPL-MCNC: 44 MG/DL (ref 40–60)
LDLC SERPL CALC-MCNC: 52 MG/DL (ref 0–100)
LDLC/HDLC SERPL: 0.9 {RATIO}
POTASSIUM SERPL-SCNC: 4.7 MMOL/L (ref 3.5–5.2)
PROT SERPL-MCNC: 7.1 G/DL (ref 6–8.5)
SODIUM SERPL-SCNC: 143 MMOL/L (ref 136–145)
TRIGL SERPL-MCNC: 272 MG/DL (ref 0–150)
VLDLC SERPL-MCNC: 42 MG/DL (ref 5–40)

## 2023-09-12 PROCEDURE — 80053 COMPREHEN METABOLIC PANEL: CPT

## 2023-09-12 PROCEDURE — 80061 LIPID PANEL: CPT

## 2023-09-12 RX ORDER — MULTIPLE VITAMINS W/ MINERALS TAB 9MG-400MCG
1 TAB ORAL DAILY
COMMUNITY

## 2023-09-12 RX ORDER — CALCIUM CARBONATE 300MG(750)
400 TABLET,CHEWABLE ORAL
COMMUNITY
Start: 2023-08-09 | End: 2024-08-08

## 2023-09-12 NOTE — PATIENT INSTRUCTIONS

## 2023-09-12 NOTE — PROGRESS NOTES
"The ABCs of the Annual Wellness Visit  Subsequent Medicare Wellness Visit    Chief Complaint   Patient presents with    Medicare Wellness-subsequent      Subjective    History of Present Illness:  Emmanuel Bermudez is a 73 y.o. male who presents for a Subsequent Medicare Wellness Visit.    Parkinson's Disease   He describes muscle spasming and pain of his bilateral lower extremities including the feet. The spasms awaken him at night. His movement disorder specialist, whom he sees for Parkinson's disease, increased his carbidopa/levodopa in approximately 07/2023 in an effort to address this, but it has not helped. They have a follow-up appointment scheduled for 02/2024. They have also tried an over-the-counter medication with no relief. They question whether exercise would help. The patient has had physical therapy over the past year. He reports 20 minutes per day of exercise (walking).     The patient's wife requests assistance with a shower  bar/bathroom equipment for safety purposes. She notes that there is a step from the living room to the kitchen which has caused several falls. She requests some type of hand  or other accommodation to help with this.     Lewy Body Dementia  His wife reports the patient is \"really starting to struggle\" and is concerned about getting lost on their upcoming trip to [place]. She plans to employ an air tag for safety purposes in the event that they become physically .     Low Back Pain  He received an epidural steroid injection from [facility] and would like to have this repeated. He requests something for low back pain to help him with an 9-day trip they have planned to [place] [employer]. He is very frustrated with his level of pain and his inability to obtain relief. He has been taking Tylenol 650 mg q.i.d. for approximately 5 years for low back pain.    Insomnia  He reports difficulty with sleep. He was given a prescription to help with sleep from the movement " disorder office (by the physician assistant); however, he was very somnolent the next day, so he discontinued this. They cannot recall the name of this medication.     Constipation   He takes Benefiber for constipation. His wife questions whether Tylenol may be exacerbating constipation.  He reports drinking 3 bottles of water daily.     Health Maintenance  The patient requests Cologuard versus colonoscopy for colon cancer screening.     The following portions of the patient's history were reviewed and   updated as appropriate: allergies, current medications, past family history, past medical history, past social history, past surgical history, and problem list.    Compared to one year ago, the patient feels his physical   health is worse.    Compared to one year ago, the patient feels his mental   health is the same.    Recent Hospitalizations:  He was not admitted to the hospital during the last year.       Current Medical Providers:  Patient Care Team:  Carson Mcadams MD as PCP - General (Family Medicine)  Neelam Arthur APRN (Nurse Practitioner)    Outpatient Medications Prior to Visit   Medication Sig Dispense Refill    acetaminophen (TYLENOL) 650 MG 8 hr tablet Take 1 tablet by mouth Every 8 (Eight) Hours As Needed.      carbidopa-levodopa (SINEMET)  MG per tablet Take 2 tablets by mouth 3 (Three) Times a Day.      carbidopa-levodopa ER (SINEMET CR)  MG per tablet START WITH 1 TABLET AT NIGHT BEFORE BED. IF NEEDED AFTER 1 WEEK, MAY INCREASE TO 2 TABLETS AT NIGHT (THIS IS IN ADDTIION TO 3 TIMES A DAY LEVODOPA YOU ARE ALREADY TAKING) DO NOT CRUSH, CHEW, OR SPLIT THIS TABLET      Cholecalciferol 25 MCG (1000 UT) capsule Take 1,000 Int'l Units by mouth Daily.      donepezil (ARICEPT) 10 MG tablet Take 1 tablet by mouth 2 (Two) Times a Day. 180 tablet 3    DULoxetine (CYMBALTA) 30 MG capsule Take 1 capsule by mouth once daily 90 capsule 0    Magnesium 400 MG tablet Take 400 mg by mouth.       "memantine (NAMENDA) 10 MG tablet Take 1 tablet by mouth 2 (Two) Times a Day. 180 tablet 3    multivitamin with minerals tablet tablet Take 1 tablet by mouth Daily.      rosuvastatin (CRESTOR) 20 MG tablet TAKE 1 TABLET BY MOUTH ONCE DAILY AT NIGHT 90 tablet 0     No facility-administered medications prior to visit.       No opioid medication identified on active medication list. I have reviewed chart for other potential  high risk medication/s and harmful drug interactions in the elderly.        Aspirin is not on active medication list.  Aspirin use is not indicated based on review of current medical condition/s. Risk of harm outweighs potential benefits.  .    Patient Active Problem List   Diagnosis    Lewy body dementia without behavioral disturbance    Vitamin D deficiency    Other hyperlipidemia    Chronic bilateral low back pain with bilateral sciatica    Cognitive communication deficit    Minor neurocognitive disorder    Parkinson disease    Insomnia due to medical condition    Slow transit constipation     Advance Care Planning  Advance Directive is on file.  ACP discussion was held with the patient during this visit. Patient has an advance directive in EMR which is still valid.     Review of Systems   Gastrointestinal:  Positive for constipation.   Musculoskeletal:  Positive for back pain and myalgias.   Neurological:  Positive for confusion.   Psychiatric/Behavioral:  Positive for sleep disturbance.       Objective    Vitals:    09/12/23 0815   BP: 98/68   BP Location: Right arm   Patient Position: Sitting   Cuff Size: Adult   Pulse: 74   Resp: 16   Temp: 98 °F (36.7 °C)   TempSrc: Temporal   Weight: 72.2 kg (159 lb 3.2 oz)   Height: 167.6 cm (66\")   PainSc: 0-No pain     Estimated body mass index is 25.7 kg/m² as calculated from the following:    Height as of this encounter: 167.6 cm (66\").    Weight as of this encounter: 72.2 kg (159 lb 3.2 oz).    BMI is >= 25 and <30. (Overweight) The following options " were offered after discussion;: exercise counseling/recommendations and nutrition counseling/recommendations      Does the patient have evidence of cognitive impairment? Yes    Physical Exam  Vitals and nursing note reviewed.   Constitutional:       General: He is not in acute distress.     Appearance: Normal appearance. He is normal weight. He is not ill-appearing or toxic-appearing.   HENT:      Nose: No congestion or rhinorrhea.   Eyes:      General:         Right eye: No discharge.         Left eye: No discharge.      Conjunctiva/sclera: Conjunctivae normal.   Cardiovascular:      Rate and Rhythm: Normal rate and regular rhythm.      Heart sounds: Normal heart sounds. No murmur heard.    No friction rub.   Pulmonary:      Effort: Pulmonary effort is normal. No respiratory distress.      Breath sounds: Normal breath sounds. No wheezing or rhonchi.   Abdominal:      General: Abdomen is flat. Bowel sounds are normal. There is no distension.      Palpations: Abdomen is soft. There is no mass.      Tenderness: There is no abdominal tenderness. There is no guarding or rebound.   Musculoskeletal:      Cervical back: Normal range of motion.      Right lower leg: No edema.      Left lower leg: No edema.   Skin:     Findings: No lesion or rash.   Neurological:      General: No focal deficit present.      Mental Status: He is alert. Mental status is at baseline.      Motor: Weakness present.      Coordination: Coordination normal.      Gait: Gait abnormal.   Psychiatric:         Mood and Affect: Mood normal.         Behavior: Behavior normal.         Thought Content: Thought content normal.         Judgment: Judgment normal.       Lab Results   Component Value Date    TRIG 272 (H) 09/12/2023    HDL 44 09/12/2023    LDL 52 09/12/2023    VLDL 42 (H) 09/12/2023            HEALTH RISK ASSESSMENT    Smoking Status:  Social History     Tobacco Use   Smoking Status Never   Smokeless Tobacco Never     Alcohol Consumption:  Social  History     Substance and Sexual Activity   Alcohol Use Never     Fall Risk Screen:    JOSEFINAADI Fall Risk Assessment was completed, and patient is at HIGH risk for falls. Assessment completed on:2023    Depression Screenin/12/2023     8:09 AM   PHQ-2/PHQ-9 Depression Screening   Little Interest or Pleasure in Doing Things 0-->not at all   Feeling Down, Depressed or Hopeless 0-->not at all   PHQ-9: Brief Depression Severity Measure Score 0       Health Habits and Functional and Cognitive Screenin/12/2023     8:07 AM   Functional & Cognitive Status   Do you have difficulty preparing food and eating? No   Do you have difficulty bathing yourself, getting dressed or grooming yourself? No   Do you have difficulty using the toilet? No   Do you have difficulty moving around from place to place? No   Do you have trouble with steps or getting out of a bed or a chair? No   Current Diet Well Balanced Diet   Exercise (times per week) 7 times per week   Current Exercises Include Walking   Do you need help using the phone?  No   Are you deaf or do you have serious difficulty hearing?  No   Do you need help to go to places out of walking distance? No   Do you need help shopping? No   Do you need help preparing meals?  Yes   Do you need help with housework?  No   Do you need help with laundry? No   Do you need help taking your medications? No   Do you need help managing money? No   Do you ever drive or ride in a car without wearing a seat belt? No   Have you felt unusual stress, anger or loneliness in the last month? Yes   Who do you live with? Spouse   If you need help, do you have trouble finding someone available to you? No   Have you been bothered in the last four weeks by sexual problems? No   Do you have difficulty concentrating, remembering or making decisions? Yes       Age-appropriate Screening Schedule:  Refer to the list below for future screening recommendations based on patient's age, sex and/or  medical conditions. Orders for these recommended tests are listed in the plan section. The patient has been provided with a written plan.    Health Maintenance   Topic Date Due    ZOSTER VACCINE (1 of 2) Never done    COLORECTAL CANCER SCREENING  01/27/2023    BMI FOLLOWUP  09/23/2023    INFLUENZA VACCINE  10/01/2023    ANNUAL WELLNESS VISIT  09/12/2024    LIPID PANEL  09/12/2024    TDAP/TD VACCINES (3 - Td or Tdap) 09/06/2026    HEPATITIS C SCREENING  Completed    COVID-19 Vaccine  Discontinued    Pneumococcal Vaccine 65+  Discontinued              Assessment & Plan   CMS Preventative Services Quick Reference  Risk Factors Identified During Encounter  Dental Screening Recommended  Vision Screening Recommended  The above risks/problems have been discussed with the patient.  Follow up actions/plans if indicated are seen below in the Assessment/Plan Section.  Pertinent information has been shared with the patient in the After Visit Summary.    Diagnoses and all orders for this visit:    1. Medicare annual wellness visit, subsequent (Primary)    2. Parkinson disease  -     Ambulatory Referral to Home Health    3. Lewy body dementia without behavioral disturbance  -     Ambulatory Referral to Home Health    4. Encounter for vaccination  -     RSVPreF3 Vac Recomb Adjuvanted (AREXVY) 120 MCG/0.5ML reconstituted suspension injection; Inject 0.5 mL into the appropriate muscle as directed by prescriber 1 (One) Time for 1 dose.  Dispense: 1 each; Refill: 0  -     Zoster Vac Recomb Adjuvanted 50 MCG/0.5ML reconstituted suspension; Inject 0.5 mL into the appropriate muscle as directed by prescriber 1 (One) Time for 1 dose.  Dispense: 1 each; Refill: 0  -     Tdap (BOOSTRIX) 5-2.5-18.5 LF-MCG/0.5 injection; Inject 0.5 mL into the appropriate muscle as directed by prescriber 1 (One) Time for 1 dose.  Dispense: 0.5 mL; Refill: 0    5. Insomnia due to medical condition    6. Slow transit constipation  -     Comprehensive Metabolic  Panel; Future    7. Chronic bilateral low back pain with bilateral sciatica  -     Comprehensive Metabolic Panel; Future    8. Screen for colon cancer  -     Cologuard - Stool, Per Rectum; Future    9. Pure hypercholesterolemia  -     Lipid Panel; Future      1. Medicare annual wellness visit, subsequent (Primary)    2. Parkinson disease  -     Ambulatory Referral to Home Health  -     Home Health will perform a home safety assessment.   -     Discussed the etiology of muscle spasming/cramping with the patient as a function of dopamine deficiency as well as the function of Sinemet as an effort to offset that.   -     Continue to follow with Neurology and Movement Disorder.   -     Patient was offered but declined Flexeril as a temporary bridge for night time symptom relief.   -     Low level, low resistance exercise was encouraged - walking, water jogging, elliptical, stationary bike.   -     Offered neurologic physical therapy referral.   -     Advised patient and his wife to contact this provider should they need assistance with resources or have any issues, concerns, or questions.     3. Lewy body dementia without behavioral disturbance  -     Ambulatory Referral to Home Health    4. Encounter for vaccination  -     RSVPreF3 Vac Recomb Adjuvanted (AREXVY) 120 MCG/0.5ML reconstituted suspension injection; Inject 0.5 mL into the appropriate muscle as directed by prescriber 1 (One) Time for 1 dose.  Dispense: 1 each; Refill: 0  -     Zoster Vac Recomb Adjuvanted 50 MCG/0.5ML reconstituted suspension; Inject 0.5 mL into the appropriate muscle as directed by prescriber 1 (One) Time for 1 dose.  Dispense: 1 each; Refill: 0  -     Tdap (BOOSTRIX) 5-2.5-18.5 LF-MCG/0.5 injection; Inject 0.5 mL into the appropriate muscle as directed by prescriber 1 (One) Time for 1 dose.  Dispense: 0.5 mL; Refill: 0    5. Insomnia due to medical condition  -     Patient's spouse will send a message via [employer] identifying the sleep  medication he was unable to tolerate. A decision will be made for medication pending that identification.     6. Slow transit constipation  -     Comprehensive Metabolic Panel; Future  -     MiraLAX instructions discussed. Start with 1 capful and advance each day up to 3 until one soft bowel movement is achieved. If this is not achieved with 3 to 4 capfuls per day, they will contact this provider for a medication change.   -     Continue Benefiber.     7. Chronic bilateral low back pain with bilateral sciatica  -     Comprehensive Metabolic Panel; Future  -     Follow up with [facility] for repeat injection.   -     Recommended over-the-counter topical preparations (Voltaren gel, BenGay, Icy Hot).   -     Discussed with patient my reticence to add oral pain medication, given the longer term interventions in play from the pain specialist.     8. Screen for colon cancer  -     Cologuard - Stool, Per Rectum; Future  -     Discussed the potential need for colonoscopy if Cologuard is positive.     9. Pure hypercholesterolemia  -     Lipid Panel; Future    Follow up in 3 months (sooner if needed) to ensure all needs are being met appropriately and that there are no gaps in his complex medical care. They may cancel or reschedule this appointment at their discretion. They were reassured that they may contact this provider for assistance with things such as refills, medical issues, etc., should they arise while on their trip to [place].     Follow Up:   Return in about 3 months (around 12/12/2023) for Recheck.     An After Visit Summary and PPPS were made available to the patient.             Transcribed from ambient dictation for Carson Mcadams MD by Robyn Wilde.  09/12/23   11:05 EDT    Patient or patient representative verbalized consent to the visit recording.  I have personally performed the services described in this document as transcribed by the above individual, and it is both accurate and  complete.

## 2023-09-13 ENCOUNTER — TELEPHONE (OUTPATIENT)
Dept: INTERNAL MEDICINE | Facility: CLINIC | Age: 73
End: 2023-09-13
Payer: MEDICARE

## 2023-09-13 ENCOUNTER — HOME HEALTH ADMISSION (OUTPATIENT)
Dept: HOME HEALTH SERVICES | Facility: HOME HEALTHCARE | Age: 73
End: 2023-09-13
Payer: MEDICARE

## 2023-09-13 NOTE — TELEPHONE ENCOUNTER
Tried to reach patient no answer left voicemail to return call.     HUB OK TO READ:  Please let the patient and/or his wife know that his cholesterol level has significantly improved since his last visit.  The remainder of his labs are within normal limits and no additional intervention is needed.  Thanks for coordinating.

## 2023-09-13 NOTE — TELEPHONE ENCOUNTER
----- Message from Carson Mcadams MD sent at 9/13/2023  7:47 AM EDT -----  Please let the patient and/or his wife know that his cholesterol level has significantly improved since his last visit.  The remainder of his labs are within normal limits and no additional intervention is needed.  Thanks for coordinating.

## 2023-09-14 ENCOUNTER — TELEPHONE (OUTPATIENT)
Dept: INTERNAL MEDICINE | Facility: CLINIC | Age: 73
End: 2023-09-14
Payer: MEDICARE

## 2023-09-14 NOTE — TELEPHONE ENCOUNTER
Annie grossman [facility] is asking for verbal order PT, OT to start as of 9/25/2023.  Please Call [phone].

## 2023-09-25 ENCOUNTER — HOME CARE VISIT (OUTPATIENT)
Dept: HOME HEALTH SERVICES | Facility: HOME HEALTHCARE | Age: 73
End: 2023-09-25
Payer: MEDICARE

## 2023-09-25 VITALS
OXYGEN SATURATION: 95 % | DIASTOLIC BLOOD PRESSURE: 72 MMHG | RESPIRATION RATE: 18 BRPM | SYSTOLIC BLOOD PRESSURE: 118 MMHG | HEART RATE: 89 BPM | TEMPERATURE: 96.2 F

## 2023-09-25 PROCEDURE — G0151 HHCP-SERV OF PT,EA 15 MIN: HCPCS

## 2023-09-25 NOTE — Clinical Note
"SOC Note:  72 y/o male referred to HH PT from PCP related to functional decline from progression of Parkinson's disease and Lewy-body Dementia; hx of frequent falls; patient lives in 2-story home with spouse, adult children live nearby and able to assist as needed; PLOF is IND with all ADLs and functional mobility, meds set-up with spouse assist with medi-planner; patient was walking 20 minutes daily for exercise    Home Health ordered for: disciplines PT (patient declines OT eval)    Reason for Hosp/Primary Dx/Co-morbidities: Parkinson's disease    Focus of Care: PT to address deficits in functional mobility, balance, and functional strength related to Parkinson's disease    Patient's goal(s): \"I want to be independent\"    Current Functional status/mobility/DME: ambulates in home with no AD, has SPC but does not use    HB status/Living Arrangements: fall risk, abnormal gait, assist of one to leave home, impaired driving ability, debilitating back pain    Skin Integrity/wound status: unremarkable    Code Status: full code    Fall Risk/Safety concerns: high risk for falls    Medication issues/Concerns:NA    Additional Problems/Concerns: NA    SDOH Barriers (i.e. caregiver concerns, social isolation, transportation, food insecurity, environment, income etc.)/Need for MSW: NA    Plan for next visit: Progress dynamic balance and functional strength to reduce risk for falls    Frequency:  1w5   "

## 2023-09-26 NOTE — HOME HEALTH
"SOC Note:  74 y/o male referred to HH PT from PCP related to functional decline from progression of Parkinson's disease and Lewy-body Dementia; hx of frequent falls; patient lives in 2-story home with spouse, adult children live nearby and able to assist as needed; PLOF is IND with all ADLs and functional mobility, meds set-up with spouse assist with medi-planner; patient was walking 20 minutes daily for exercise    Home Health ordered for: disciplines PT (patient declines OT eval)    Reason for Hosp/Primary Dx/Co-morbidities: Parkinson's disease    Focus of Care: PT to address deficits in functional mobility, balance, and functional strength related to Parkinson's disease    Patient's goal(s): \"I want to be independent\"    Current Functional status/mobility/DME: ambulates in home with no AD, has SPC but does not use    HB status/Living Arrangements: fall risk, abnormal gait, assist of one to leave home, impaired driving ability, debilitating back pain    Skin Integrity/wound status: unremarkable    Code Status: full code    Fall Risk/Safety concerns: high risk for falls    Medication issues/Concerns:NA    Additional Problems/Concerns: NA    SDOH Barriers (i.e. caregiver concerns, social isolation, transportation, food insecurity, environment, income etc.)/Need for MSW: NA    Plan for next visit: Progress dynamic balance and functional strength to reduce risk for falls    Frequency:  1w5"

## 2023-10-06 ENCOUNTER — HOME CARE VISIT (OUTPATIENT)
Dept: HOME HEALTH SERVICES | Facility: HOME HEALTHCARE | Age: 73
End: 2023-10-06
Payer: MEDICARE

## 2023-10-06 NOTE — CASE COMMUNICATION
Patient missed a PTA visit from [facility] on 10/6/23.    Reason: No answer via phone, or message returned to schedule visit.      For your records only.   As per home health protocol, MD must be notified of missed/cancelled visits; therefore the prescribed frequency was not met.

## 2023-10-11 ENCOUNTER — HOME CARE VISIT (OUTPATIENT)
Dept: HOME HEALTH SERVICES | Facility: HOME HEALTHCARE | Age: 73
End: 2023-10-11
Payer: MEDICARE

## 2023-10-11 VITALS
RESPIRATION RATE: 18 BRPM | OXYGEN SATURATION: 96 % | TEMPERATURE: 96.8 F | DIASTOLIC BLOOD PRESSURE: 68 MMHG | SYSTOLIC BLOOD PRESSURE: 114 MMHG | HEART RATE: 60 BPM

## 2023-10-11 PROCEDURE — G0151 HHCP-SERV OF PT,EA 15 MIN: HCPCS

## 2023-10-18 ENCOUNTER — HOME CARE VISIT (OUTPATIENT)
Dept: HOME HEALTH SERVICES | Facility: HOME HEALTHCARE | Age: 73
End: 2023-10-18
Payer: MEDICARE

## 2023-10-18 VITALS
TEMPERATURE: 96.8 F | SYSTOLIC BLOOD PRESSURE: 102 MMHG | RESPIRATION RATE: 18 BRPM | HEART RATE: 80 BPM | OXYGEN SATURATION: 96 % | DIASTOLIC BLOOD PRESSURE: 70 MMHG

## 2023-10-18 PROCEDURE — G0151 HHCP-SERV OF PT,EA 15 MIN: HCPCS

## 2023-10-25 ENCOUNTER — HOME CARE VISIT (OUTPATIENT)
Dept: HOME HEALTH SERVICES | Facility: HOME HEALTHCARE | Age: 73
End: 2023-10-25
Payer: MEDICARE

## 2023-11-01 ENCOUNTER — HOME CARE VISIT (OUTPATIENT)
Dept: HOME HEALTH SERVICES | Facility: HOME HEALTHCARE | Age: 73
End: 2023-11-01
Payer: MEDICARE

## 2023-11-01 VITALS
RESPIRATION RATE: 18 BRPM | TEMPERATURE: 97.3 F | SYSTOLIC BLOOD PRESSURE: 130 MMHG | HEART RATE: 62 BPM | OXYGEN SATURATION: 97 % | DIASTOLIC BLOOD PRESSURE: 62 MMHG

## 2023-11-01 PROCEDURE — G0151 HHCP-SERV OF PT,EA 15 MIN: HCPCS

## 2023-11-26 DIAGNOSIS — E78.49 OTHER HYPERLIPIDEMIA: ICD-10-CM

## 2023-11-27 RX ORDER — ROSUVASTATIN CALCIUM 20 MG/1
TABLET, COATED ORAL
Qty: 90 TABLET | Refills: 0 | Status: SHIPPED | OUTPATIENT
Start: 2023-11-27

## 2023-12-04 RX ORDER — DULOXETIN HYDROCHLORIDE 30 MG/1
30 CAPSULE, DELAYED RELEASE ORAL DAILY
Qty: 90 CAPSULE | Refills: 0 | Status: SHIPPED | OUTPATIENT
Start: 2023-12-04

## 2024-01-09 NOTE — TELEPHONE ENCOUNTER
Patient wife calling- referral faxed to the practice [phone] from [place]- Dr. Clemons. She has called multiple times to check status and obtain appt. There is nothing in chart yet- referral sent to the practice.     [phone] Elva (wife)   10

## 2024-02-19 ENCOUNTER — OFFICE VISIT (OUTPATIENT)
Dept: INTERNAL MEDICINE | Facility: CLINIC | Age: 74
End: 2024-02-19
Payer: MEDICARE

## 2024-02-19 VITALS
RESPIRATION RATE: 18 BRPM | BODY MASS INDEX: 24.66 KG/M2 | WEIGHT: 152.8 LBS | HEART RATE: 68 BPM | DIASTOLIC BLOOD PRESSURE: 74 MMHG | TEMPERATURE: 97.3 F | SYSTOLIC BLOOD PRESSURE: 108 MMHG

## 2024-02-19 DIAGNOSIS — G47.01 INSOMNIA DUE TO MEDICAL CONDITION: ICD-10-CM

## 2024-02-19 DIAGNOSIS — Z23 ENCOUNTER FOR VACCINATION: ICD-10-CM

## 2024-02-19 DIAGNOSIS — K59.01 SLOW TRANSIT CONSTIPATION: ICD-10-CM

## 2024-02-19 DIAGNOSIS — G89.29 CHRONIC BILATERAL LOW BACK PAIN WITH BILATERAL SCIATICA: ICD-10-CM

## 2024-02-19 DIAGNOSIS — M54.41 CHRONIC BILATERAL LOW BACK PAIN WITH BILATERAL SCIATICA: ICD-10-CM

## 2024-02-19 DIAGNOSIS — G20.B1 PARKINSON'S DISEASE WITH DYSKINESIA WITHOUT FLUCTUATING MANIFESTATIONS: Primary | ICD-10-CM

## 2024-02-19 DIAGNOSIS — M54.42 CHRONIC BILATERAL LOW BACK PAIN WITH BILATERAL SCIATICA: ICD-10-CM

## 2024-02-19 DIAGNOSIS — R63.4 WEIGHT LOSS: ICD-10-CM

## 2024-02-19 DIAGNOSIS — R11.0 CHRONIC NAUSEA: ICD-10-CM

## 2024-02-19 PROBLEM — M47.816 LUMBAR SPONDYLOSIS: Status: ACTIVE | Noted: 2023-05-17

## 2024-02-19 PROCEDURE — G2211 COMPLEX E/M VISIT ADD ON: HCPCS | Performed by: STUDENT IN AN ORGANIZED HEALTH CARE EDUCATION/TRAINING PROGRAM

## 2024-02-19 PROCEDURE — 1160F RVW MEDS BY RX/DR IN RCRD: CPT | Performed by: STUDENT IN AN ORGANIZED HEALTH CARE EDUCATION/TRAINING PROGRAM

## 2024-02-19 PROCEDURE — 1159F MED LIST DOCD IN RCRD: CPT | Performed by: STUDENT IN AN ORGANIZED HEALTH CARE EDUCATION/TRAINING PROGRAM

## 2024-02-19 PROCEDURE — 99214 OFFICE O/P EST MOD 30 MIN: CPT | Performed by: STUDENT IN AN ORGANIZED HEALTH CARE EDUCATION/TRAINING PROGRAM

## 2024-02-19 RX ORDER — UREA 10 %
1 LOTION (ML) TOPICAL NIGHTLY
COMMUNITY

## 2024-02-19 RX ORDER — QUETIAPINE FUMARATE 25 MG/1
1 TABLET, FILM COATED ORAL NIGHTLY
COMMUNITY
End: 2024-02-19

## 2024-02-19 NOTE — PROGRESS NOTES
Follow Up Office Visit      Date: 2024   Patient Name: Emmanuel Bermudez  : 1950   MRN: [id number]     Chief Complaint:    Chief Complaint   Patient presents with    Parkinson's Disease     fu       History of Present Illness: Emmanuel Bermudez is a 73 y.o. male who is here today to follow up with chronic care management.    History of Present Illness  The patient presents for evaluation of multiple medical concerns. He is accompanied by an adult male.    He has been complaining about not being able to sleep. They have tried 125 mg of Benadryl, which did not work. He is no longer taking Seroquel. He took it for 2 days, but he did not like it because it made him feel down. They went to the movement specialist on Friday, who did not want him to be on Benadryl. He was prescribed melatonin 1 mg, which he has taken only for 2 nights. The adult male is not sure if it is working or not, but would like to try it a little bit longer. The movement specialist told him that if the melatonin did not work, she would not mind prescribing clonazepam 2.5 mg. They have not tried trazodone or Vistaril.    He has experiences quite often where he takes his medicines and starts vomiting. The adult male is not sure if it is because he is eating sweets or dessert for breakfast, which is making him nauseated. This has been going on sporadically for a long time. He does not vomit with every meal. It is usually in the morning medicines. He takes a multivitamin, but he does not take it until after lunch. They took him off of naproxen. He has decreased his Tylenol. He takes Tylenol Arthritis 650 mg 1 in the morning. He takes Pepcid occasionally.    He is losing weight. Lately, he has lost about 6 more pounds. He likes sugar. He ate Pop-Tarts 2 times.    He has continued cramping issues. He is following with neurology. His leg locks. They saw a PA last summer, who increased his Sinemet to 250 mg 3 times a day. He had not seen him in  a year. She noticed involuntary movement, which could have been from the higher dose. They need to go back down if it is bothersome. They are not working with physical therapy with home health.    He has trouble with constipation. He is not taking MiraLAX on a regular basis. He is doing half of a capful every other day.    He had another injection for his back pain with [facility]. It did feel better after the injection. He takes Tylenol Arthritis 650 mg 1 in the morning.   He takes rosuvastatin at night.      Subjective      Review of Systems:   Review of Systems   All other systems reviewed and are negative.      I have reviewed the patients family history, social history, past medical history, past surgical history and have updated it as appropriate.     Medications:     Current Outpatient Medications:     carbidopa-levodopa (SINEMET)  MG per tablet, Take 1 tablet by mouth 3 (Three) Times a Day., Disp: , Rfl:     carbidopa-levodopa ER (SINEMET CR)  MG per tablet, START WITH 1 TABLET AT NIGHT BEFORE BED. IF NEEDED AFTER 1 WEEK, MAY INCREASE TO 2 TABLETS AT NIGHT (THIS IS IN ADDTIION TO 3 TIMES A DAY LEVODOPA YOU ARE ALREADY TAKING) DO NOT CRUSH, CHEW, OR SPLIT THIS TABLET, Disp: , Rfl:     Cholecalciferol 25 MCG (1000 UT) capsule, Take 1,000 Int'l Units by mouth Daily., Disp: , Rfl:     donepezil (ARICEPT) 10 MG tablet, Take 1 tablet by mouth 2 (Two) Times a Day., Disp: 180 tablet, Rfl: 3    DULoxetine (CYMBALTA) 30 MG capsule, Take 1 capsule by mouth once daily, Disp: 90 capsule, Rfl: 0    memantine (NAMENDA) 10 MG tablet, Take 1 tablet by mouth 2 (Two) Times a Day., Disp: 180 tablet, Rfl: 3    multivitamin with minerals tablet tablet, Take 1 tablet by mouth Daily., Disp: , Rfl:     rosuvastatin (CRESTOR) 20 MG tablet, TAKE 1 TABLET BY MOUTH ONCE DAILY AT NIGHT, Disp: 90 tablet, Rfl: 0    melatonin 1 MG tablet, Take 1 tablet by mouth Every Night., Disp: , Rfl:     RSVPreF3 Vac Recomb  Adjuvanted (AREXVY) 120 MCG/0.5ML reconstituted suspension injection, Inject 0.5 mL into the appropriate muscle as directed by prescriber 1 (One) Time for 1 dose., Disp: 0.5 mL, Rfl: 0    Zoster Vac Recomb Adjuvanted 50 MCG/0.5ML reconstituted suspension, Inject 0.5 mL into the appropriate muscle as directed by prescriber 1 (One) Time for 1 dose., Disp: 1 each, Rfl: 0    Allergies:   Allergies   Allergen Reactions    Haloperidol Other (See Comments)     Patient with LBD and parkinsonian symptoms could worsen    Quetiapine Other (See Comments)     Patient with LBD       Objective     Physical Exam: Please see above  Vital Signs:   Vitals:    02/19/24 0811   BP: 108/74   BP Location: Right arm   Patient Position: Sitting   Cuff Size: Adult   Pulse: 68   Resp: 18   Temp: 97.3 °F (36.3 °C)   TempSrc: Temporal   Weight: 69.3 kg (152 lb 12.8 oz)   PainSc:   4   PainLoc: Generalized     Body mass index is 24.66 kg/m².          Physical Exam  Vitals and nursing note reviewed.   Constitutional:       General: He is not in acute distress.     Appearance: Normal appearance. He is normal weight. He is not ill-appearing or toxic-appearing.   HENT:      Nose: No congestion or rhinorrhea.   Eyes:      General:         Right eye: No discharge.         Left eye: No discharge.      Conjunctiva/sclera: Conjunctivae normal.   Pulmonary:      Effort: Pulmonary effort is normal. No respiratory distress.   Abdominal:      General: Abdomen is flat. There is no distension.   Musculoskeletal:      Cervical back: Normal range of motion.   Skin:     Coloration: Skin is not jaundiced.      Findings: No rash.   Neurological:      General: No focal deficit present.      Mental Status: He is alert. Mental status is at baseline.      Motor: Weakness present.      Coordination: Coordination abnormal.   Psychiatric:         Mood and Affect: Mood normal.         Behavior: Behavior normal.         Thought Content: Thought content normal.          "Judgment: Judgment normal.         Procedures    Results:   Results      Labs:   No results found for: \"HGBA1C\", \"CMP\", \"CBCDIFFPANEL\", \"CREAT\", \"TSH\"     Imaging:   No valid procedures specified.     Assessment / Plan      Assessment/Plan:   Problem List Items Addressed This Visit       Chronic bilateral low back pain with bilateral sciatica    Parkinson disease - Primary    Relevant Orders    Ambulatory Referral to Physical Therapy Evaluate and treat (Completed)    Insomnia due to medical condition    Slow transit constipation    Chronic nausea    Weight loss     Other Visit Diagnoses       Encounter for vaccination        Relevant Medications    Zoster Vac Recomb Adjuvanted 50 MCG/0.5ML reconstituted suspension    RSVPreF3 Vac Recomb Adjuvanted (AREXVY) 120 MCG/0.5ML reconstituted suspension injection            Assessment & Plan  1. Insomnia.  We discussed that duloxetine is not at the max dose. He will continue melatonin 1 mg 1 extra pill of 2 mg total per night.    2. Nausea.  He is on a decent number of medications, which could be playing into his symptoms. He can take up to 4 g of Tylenol a day, but he can have rebound headaches and pain. He will take the multivitamin at night. He can take Pepcid preemptively in the mornings. If his symptoms continue, we can try an as-needed anti-nausea medication.    3. Weight loss.  I recommended Ensure as an option. He will start Ensure once a day. He will continue to eat a protein rich diet.    4. Parkinsonian symptoms.  He is following with neurology. I will refer him to physical therapy.    5. Constipation.  He will take MiraLAX 1 capful at least once daily with a full glass of liquid. If he notices bowel incontinence, he will pull back a little bit.    6. Back pain.  He will continue to work with physical therapy. He will receive shingles and RSV vaccines.    Follow-up  The patient will follow up in 3 months via telehealth.    Follow Up:   Return in about 3 months " (around 5/19/2024) for Recheck via telehealth.    I spent 35 minutes caring for Edward on this date of service. This time includes time spent by me in the following activities: preparing for the visit, reviewing tests, obtaining and/or reviewing a separately obtained history, performing a medically appropriate examination and/or evaluation, counseling and educating the patient/family/caregiver, ordering medications, tests, or procedures, and documenting information in the medical record.     Patient or patient representative verbalized consent for the use of Ambient Listening during the visit with  Carson Mcadams MD for chart documentation. 2/19/2024  09:04 EST    Carson Mcadams MD  [facility] [place]

## 2024-02-25 DIAGNOSIS — E78.49 OTHER HYPERLIPIDEMIA: ICD-10-CM

## 2024-02-26 RX ORDER — ROSUVASTATIN CALCIUM 20 MG/1
TABLET, COATED ORAL
Qty: 90 TABLET | Refills: 0 | Status: SHIPPED | OUTPATIENT
Start: 2024-02-26

## 2024-02-26 RX ORDER — DULOXETIN HYDROCHLORIDE 30 MG/1
30 CAPSULE, DELAYED RELEASE ORAL DAILY
Qty: 90 CAPSULE | Refills: 0 | Status: SHIPPED | OUTPATIENT
Start: 2024-02-26

## 2024-03-25 ENCOUNTER — TELEPHONE (OUTPATIENT)
Dept: INTERNAL MEDICINE | Facility: CLINIC | Age: 74
End: 2024-03-25
Payer: MEDICARE

## 2024-03-25 NOTE — TELEPHONE ENCOUNTER
Gerald from [facility] is calling to report on patient having Orthostatic hypotension, reading as laying down 126/82 HR-75 and standing 88/67 and HR-82. Patient also reported fatigue during exercise and dizziness after.

## 2024-03-25 NOTE — TELEPHONE ENCOUNTER
Thank you.  Please have the patient hydrate well and we can plan on evaluating sooner than 5/20/2024 in clinic if he is having persistent issues with dizziness and low blood pressure.  Thanks.

## 2024-03-26 NOTE — TELEPHONE ENCOUNTER
Called patients wife and read providers message, good verb given. She stated that patient is doing well and is not currently having these symptoms. Advised to call with any questions or if symptoms persist or worsen.

## 2024-03-27 ENCOUNTER — TELEPHONE (OUTPATIENT)
Dept: INTERNAL MEDICINE | Facility: CLINIC | Age: 74
End: 2024-03-27
Payer: MEDICARE

## 2024-03-27 NOTE — TELEPHONE ENCOUNTER
Patient was at PT today and his bloodpressure dropped. It started out at 107/77 and then while exercising it dropped to 82/66 he became symptomatic. I offered patient appt tomorrow at 215 but patient's wife has obligations with grandson. She states she would call back that she didn't know what to do.

## 2024-03-28 ENCOUNTER — TELEMEDICINE (OUTPATIENT)
Dept: INTERNAL MEDICINE | Facility: CLINIC | Age: 74
End: 2024-03-28
Payer: MEDICARE

## 2024-03-28 DIAGNOSIS — G20.B1 PARKINSON'S DISEASE WITH DYSKINESIA WITHOUT FLUCTUATING MANIFESTATIONS: Primary | ICD-10-CM

## 2024-03-28 DIAGNOSIS — F02.80 LEWY BODY DEMENTIA WITHOUT BEHAVIORAL DISTURBANCE: ICD-10-CM

## 2024-03-28 DIAGNOSIS — I95.1 ORTHOSTATIC HYPOTENSION: ICD-10-CM

## 2024-03-28 DIAGNOSIS — K59.01 SLOW TRANSIT CONSTIPATION: ICD-10-CM

## 2024-03-28 DIAGNOSIS — G31.83 LEWY BODY DEMENTIA WITHOUT BEHAVIORAL DISTURBANCE: ICD-10-CM

## 2024-03-28 PROBLEM — I95.0 IDIOPATHIC HYPOTENSION: Status: ACTIVE | Noted: 2024-03-28

## 2024-03-28 PROCEDURE — 99213 OFFICE O/P EST LOW 20 MIN: CPT | Performed by: STUDENT IN AN ORGANIZED HEALTH CARE EDUCATION/TRAINING PROGRAM

## 2024-03-28 NOTE — PROGRESS NOTES
Telehealth E-Visit      Patient Name: Emmanuel Bermudez  : 1950   MRN: [id number]   14:46 EDT    Chief Complaint:  No chief complaint on file.      I have reviewed the E-Visit questionnaire and the patient's answers, my assessment and plan are listed below.     This provider is located at the [facility] [address] [facility], [address] [place] [address] using a secure F[employer]at [employer]at Video Visit through [employer]. Patient is being seen remotely via telehealth at their home address in [place], and stated they are in a secure environment for this session. The patient's condition being diagnosed/treated is appropriate for telemedicine. The provider identified herself as well as her credentials. The patient, and/or patients guardian, consent to be seen remotely, and when consent is given they understand that the consent allows for patient identifiable information to be sent to a third party as needed. They may refuse to be seen remotely at any time. The electronic data is encrypted and password protected, and the patient and/or guardian has been advised of the potential risks to privacy not withstanding such measures.    You have chosen to receive care through a telehealth visit. Do you consent to use a audio connection for your medical care today (video malfunctioned during visit? Yes    History of Present Illness: Emmanuel Bermudez is a 73 y.o. male who is here today to follow up with chronic care management.    History of Present Illness  The patient presents via virtual visit for evaluation of hypotension. He is accompanied by an adult female.    The adult female is wondering if they need to reschedule an appointment about his blood pressure. She ordered a blood pressure cuff yesterday and got it today. His blood pressure was 98/58 or 60. She is worried because she does not know what to do if it goes really low. He had more dizziness when they went  to PT. This happened 2 times on Monday and Wednesday. When they started doing stuff with him, he said he was dizzy. They sit him down and take his blood pressure and it would be 88/68. He was outside with his grandson who was sick. He was standing around, sitting, and watching him and he seemed to be fine.      Subjective      Review of Systems:   Review of Systems   All other systems reviewed and are negative.      I have reviewed and the following portions of the patient's history were updated as appropriate: past family history, past medical history, past social history, past surgical history and problem list.    Medications:     Current Outpatient Medications:     carbidopa-levodopa (SINEMET)  MG per tablet, Take 1 tablet by mouth 3 (Three) Times a Day., Disp: , Rfl:     carbidopa-levodopa ER (SINEMET CR)  MG per tablet, START WITH 1 TABLET AT NIGHT BEFORE BED. IF NEEDED AFTER 1 WEEK, MAY INCREASE TO 2 TABLETS AT NIGHT (THIS IS IN ADDTIION TO 3 TIMES A DAY LEVODOPA YOU ARE ALREADY TAKING) DO NOT CRUSH, CHEW, OR SPLIT THIS TABLET, Disp: , Rfl:     Cholecalciferol 25 MCG (1000 UT) capsule, Take 1,000 Int'l Units by mouth Daily., Disp: , Rfl:     donepezil (ARICEPT) 10 MG tablet, Take 1 tablet by mouth 2 (Two) Times a Day., Disp: 180 tablet, Rfl: 3    DULoxetine (CYMBALTA) 30 MG capsule, Take 1 capsule by mouth once daily, Disp: 90 capsule, Rfl: 0    melatonin 1 MG tablet, Take 1 tablet by mouth Every Night., Disp: , Rfl:     memantine (NAMENDA) 10 MG tablet, Take 1 tablet by mouth 2 (Two) Times a Day., Disp: 180 tablet, Rfl: 3    multivitamin with minerals tablet tablet, Take 1 tablet by mouth Daily., Disp: , Rfl:     rosuvastatin (CRESTOR) 20 MG tablet, TAKE 1 TABLET BY MOUTH ONCE DAILY AT NIGHT, Disp: 90 tablet, Rfl: 0    Allergies:   Allergies   Allergen Reactions    Haloperidol Other (See Comments)     Patient with LBD and parkinsonian symptoms could worsen    Quetiapine Other (See Comments)      Patient with LBD       Objective     Physical Exam:  Vital Signs: There were no vitals filed for this visit.  There is no height or weight on file to calculate BMI.    Physical Exam  Vitals and nursing note reviewed.   Constitutional:       General: He is not in acute distress.  Pulmonary:      Effort: Pulmonary effort is normal.   Neurological:      General: No focal deficit present.           Assessment / Plan      Assessment/Plan:   Problem List Items Addressed This Visit       Lewy body dementia without behavioral disturbance    Parkinson disease - Primary    Slow transit constipation    Idiopathic hypotension         Diagnoses and all orders for this visit:    1. Parkinson's disease with dyskinesia without fluctuating manifestations (Primary)    2. Lewy body dementia without behavioral disturbance    3. Slow transit constipation    4. Orthostatic hypotension       Assessment & Plan  1. Orthostatic hypotension.  In these episodes of hypotension, they could be due to a multitude of things. Parkinson's disease can be associated with dysautonomia. He is going to be the most prone to significant blood pressure changes whenever he is going to physical therapy and making big changes with his movements, standing up and sitting down, doing exercises, and moving quicker than he is used to because his heart will not be able to respond as quickly. It would be very rare that we would ever give a medication to actually increase the blood pressure because that could put him at higher cardiovascular risk. He was advised to hydrate well and increase sodium intake. He was advised to continue to work with PT to maintain his musculature and coordination. Ideally, we can keep the Sinemet at the same dose along with his memantine and donepezil.    Follow-up  The patient will follow up as needed.    Follow Up:   Return in about 8 weeks (around 5/20/2024) for Next scheduled follow up.    Any medications prescribed have been sent  electronically to   [address] - [address] [facility] - [phone]  - [phone] FX  [address] [facility] [address]  Phone: [phone] Fax: [phone]          Patient or patient representative verbalized consent for the use of Ambient Listening during the visit with  Carson Mcadams MD for chart documentation. 3/28/2024  16:20 EDT      Carson Mcadams MD  [facility]   03/28/24  14:46 EDT

## 2024-04-15 ENCOUNTER — TELEPHONE (OUTPATIENT)
Dept: INTERNAL MEDICINE | Facility: CLINIC | Age: 74
End: 2024-04-15
Payer: MEDICARE

## 2024-04-15 NOTE — TELEPHONE ENCOUNTER
GIANT recommended monitoring as  patient has been having symptoms like this lately, however, dizziness is new. ROBERTO also informed patient they would let us know to see what our recommendations are.

## 2024-04-15 NOTE — TELEPHONE ENCOUNTER
Caller: [facility]    Relationship: [facility]    Best call back number: [phone]     PATIENT WAS BEING SEEN TODAY.  BEFORE THERAPY WAS INITIATED HE STARTED TO DRY HEAVE, HE WAS PALE SWEATY AND DIZZY

## 2024-04-16 NOTE — TELEPHONE ENCOUNTER
Spoke with Ms. Dinh and she confirmed Pt is doing okay, just a little spell and will come for next appointment.

## 2024-05-17 ENCOUNTER — TELEPHONE (OUTPATIENT)
Dept: INTERNAL MEDICINE | Facility: CLINIC | Age: 74
End: 2024-05-17
Payer: MEDICARE

## 2024-05-17 NOTE — TELEPHONE ENCOUNTER
[facility] called. Patient has orthostatic hypotension when standing or walking. He has chronic lightheadedness  and dizziness. Patient seems to be chronically dehydrated and therapist is concerned that the patient may fall. There has been minimal relief in the lower back.  Please call: [phone]

## 2024-05-20 ENCOUNTER — TELEMEDICINE (OUTPATIENT)
Dept: INTERNAL MEDICINE | Facility: CLINIC | Age: 74
End: 2024-05-20
Payer: MEDICARE

## 2024-05-20 DIAGNOSIS — G47.01 INSOMNIA DUE TO MEDICAL CONDITION: ICD-10-CM

## 2024-05-20 DIAGNOSIS — M47.816 LUMBAR SPONDYLOSIS: ICD-10-CM

## 2024-05-20 DIAGNOSIS — F02.80 LEWY BODY DEMENTIA WITHOUT BEHAVIORAL DISTURBANCE: ICD-10-CM

## 2024-05-20 DIAGNOSIS — G31.83 LEWY BODY DEMENTIA WITHOUT BEHAVIORAL DISTURBANCE: ICD-10-CM

## 2024-05-20 DIAGNOSIS — R55 POSTURAL DIZZINESS WITH PRESYNCOPE: ICD-10-CM

## 2024-05-20 DIAGNOSIS — G20.B1 PARKINSON'S DISEASE WITH DYSKINESIA WITHOUT FLUCTUATING MANIFESTATIONS: Primary | ICD-10-CM

## 2024-05-20 DIAGNOSIS — R25.2 LEG CRAMPING: ICD-10-CM

## 2024-05-20 DIAGNOSIS — R42 POSTURAL DIZZINESS WITH PRESYNCOPE: ICD-10-CM

## 2024-05-20 DIAGNOSIS — Z23 ENCOUNTER FOR VACCINATION: ICD-10-CM

## 2024-05-20 PROCEDURE — 1125F AMNT PAIN NOTED PAIN PRSNT: CPT | Performed by: STUDENT IN AN ORGANIZED HEALTH CARE EDUCATION/TRAINING PROGRAM

## 2024-05-20 PROCEDURE — G2211 COMPLEX E/M VISIT ADD ON: HCPCS | Performed by: STUDENT IN AN ORGANIZED HEALTH CARE EDUCATION/TRAINING PROGRAM

## 2024-05-20 PROCEDURE — 99214 OFFICE O/P EST MOD 30 MIN: CPT | Performed by: STUDENT IN AN ORGANIZED HEALTH CARE EDUCATION/TRAINING PROGRAM

## 2024-05-20 RX ORDER — CHOLECALCIFEROL (VITAMIN D3) 125 MCG
5 CAPSULE ORAL NIGHTLY
Qty: 30 TABLET | Refills: 1 | Status: SHIPPED | OUTPATIENT
Start: 2024-05-20

## 2024-05-20 NOTE — PROGRESS NOTES
Telehealth E-Visit      Patient Name: Emmanuel Bermudez  : 1950   MRN: [id number]   13:14 EDT    Chief Complaint:  No chief complaint on file.      I have reviewed the E-Visit questionnaire and the patient's answers, my assessment and plan are listed below.     This provider is located at the [facility] (through [facility]), [address], [address] [place] [address] using a secure [email or web address] Video Visit through [employer]. Patient is being seen remotely via telehealth at their home address in [place], and stated they are in a secure environment for this session. The patient's condition being diagnosed/treated is appropriate for telemedicine. The provider identified herself as well as her credentials. The patient, and/or patients guardian, consent to be seen remotely, and when consent is given they understand that the consent allows for patient identifiable information to be sent to a third party as needed. They may refuse to be seen remotely at any time. The electronic data is encrypted and password protected, and the patient and/or guardian has been advised of the potential risks to privacy not withstanding such measures.    You have chosen to receive care through a telehealth visit. Do you consent to use a video/audio connection for your medical care today? Yes    History of Present Illness: Emmanuel Bermudez is a 74 y.o. male who is here today to follow up with chronic care management.    History of Present Illness  The patient presents via virtual visit for evaluation of multiple medical concerns. He is accompanied by an adult female.    The patient reports persistent cramping in his legs, feet, and back, which has recently begun to disrupt his sleep. The cramping is particularly severe enough to radiate down his legs, but subsides upon bending his knees. The severity of the cramping appears to be escalating.    The patient's  reports that he  continues to experience sleep disturbances. He is currently on a regimen of 1 mg of melatonin. His sleep schedule remains consistent, retiring at 11:00 PM, and he tends to sleep late each day.    The patient continues to participate in physical therapy, however, the physical therapist is contemplating a repeat x-ray of his back. The patient is scheduled to visit [facility] on 06/13/2024 for an injection prior to 08/2024. He experienced dizziness at the onset of his physical therapy sessions, prompting a reduction in his activity. The physical therapist noted a decrease in his blood pressure during physical therapy, prompting an increase in his salt intake.      Subjective      Review of Systems:   Review of Systems   All other systems reviewed and are negative.      I have reviewed and the following portions of the patient's history were updated as appropriate: past family history, past medical history, past social history, past surgical history and problem list.    Medications:     Current Outpatient Medications:     melatonin 5 MG tablet tablet, Take 1 tablet by mouth Every Night., Disp: 30 tablet, Rfl: 1    carbidopa-levodopa (SINEMET)  MG per tablet, Take 1 tablet by mouth 3 (Three) Times a Day., Disp: , Rfl:     carbidopa-levodopa ER (SINEMET CR)  MG per tablet, START WITH 1 TABLET AT NIGHT BEFORE BED. IF NEEDED AFTER 1 WEEK, MAY INCREASE TO 2 TABLETS AT NIGHT (THIS IS IN ADDTIION TO 3 TIMES A DAY LEVODOPA YOU ARE ALREADY TAKING) DO NOT CRUSH, CHEW, OR SPLIT THIS TABLET, Disp: , Rfl:     Cholecalciferol 25 MCG (1000 UT) capsule, Take 1,000 Int'l Units by mouth Daily., Disp: , Rfl:     donepezil (ARICEPT) 10 MG tablet, Take 1 tablet by mouth 2 (Two) Times a Day., Disp: 180 tablet, Rfl: 3    DULoxetine (CYMBALTA) 30 MG capsule, Take 1 capsule by mouth once daily, Disp: 90 capsule, Rfl: 0    memantine (NAMENDA) 10 MG tablet, Take 1 tablet by mouth 2 (Two) Times a Day., Disp: 180 tablet,  Rfl: 3    multivitamin with minerals tablet tablet, Take 1 tablet by mouth Daily., Disp: , Rfl:     rosuvastatin (CRESTOR) 20 MG tablet, TAKE 1 TABLET BY MOUTH ONCE DAILY AT NIGHT, Disp: 90 tablet, Rfl: 0    RSVPreF3 Vac Recomb Adjuvanted (AREXVY) 120 MCG/0.5ML reconstituted suspension injection, Inject 0.5 mL into the appropriate muscle as directed by prescriber 1 (One) Time for 1 dose., Disp: 0.5 mL, Rfl: 0    Zoster Vac Recomb Adjuvanted 50 MCG/0.5ML reconstituted suspension, Inject 0.5 mL into the appropriate muscle as directed by prescriber 1 (One) Time for 1 dose., Disp: 1 each, Rfl: 0    Allergies:   Allergies   Allergen Reactions    Haloperidol Other (See Comments)     Patient with LBD and parkinsonian symptoms could worsen    Quetiapine Other (See Comments)     Patient with LBD       Objective     Physical Exam:  Vital Signs: There were no vitals filed for this visit.  There is no height or weight on file to calculate BMI.    Physical Exam  Vitals and nursing note reviewed.   Constitutional:       General: He is not in acute distress.     Appearance: Normal appearance. He is not ill-appearing or toxic-appearing.   Eyes:      General:         Right eye: No discharge.         Left eye: No discharge.      Conjunctiva/sclera: Conjunctivae normal.   Pulmonary:      Effort: Pulmonary effort is normal. No respiratory distress.   Musculoskeletal:      Cervical back: Normal range of motion.   Skin:     Coloration: Skin is not jaundiced.      Findings: No rash.   Neurological:      General: No focal deficit present.      Mental Status: He is alert. Mental status is at baseline.      Coordination: Coordination abnormal.   Psychiatric:         Mood and Affect: Mood normal.         Behavior: Behavior normal.           Assessment / Plan      Assessment/Plan:   Problem List Items Addressed This Visit       Lewy body dementia without behavioral disturbance    Parkinson disease - Primary    Insomnia due to medical condition     Relevant Medications    melatonin 5 MG tablet tablet    Lumbar spondylosis    Leg cramping    Relevant Orders    CBC & Differential    Comprehensive Metabolic Panel    Magnesium    Phosphorus     Other Visit Diagnoses       Postural dizziness with presyncope        Relevant Orders    Adult Transthoracic Echo Complete W/ Cont if Necessary Per Protocol    Duplex Carotid Ultrasound CAR    Ambulatory Referral to [facility] [place] [facility] [place]    Encounter for vaccination        Relevant Medications    Zoster Vac Recomb Adjuvanted 50 MCG/0.5ML reconstituted suspension    RSVPreF3 Vac Recomb Adjuvanted (AREXVY) 120 MCG/0.5ML reconstituted suspension injection          Assessment & Plan  1. Parkinson's disease.  Laboratory tests have been ordered to further investigate the cause of the patient's symptoms. Should the lab tests return normal results, a nerve conduction study may be considered to investigate potential neuropathic pain symptoms. If a specific neuropathy or other abnormalities are detected, appropriate treatment will be initiated. If all tests return normal results, a referral to neurology will be made to adjust the patient's medications to potentially alleviate some of his symptoms.    2. Insomnia.  The patient's melatonin dosage will be increased to 5 mg.    3. Undergoing physical therapy.  The decision has been made to postpone an x-ray of the patient's back at this time due to close follow-up with the interventional pain specialist.    4.  Orthostatic hypotension  Presyncope  The patient's dizziness is likely due to orthostatic hypotension, a condition that has not significantly improved and is primarily associated with exertion. The patient has been advised to modify his salt intake. An echocardiogram and carotid ultrasound will be performed to rule out any severe valvular abnormalities.  Follow-up with the cardiology syncope clinic was ordered.    Follow-up  The patient is scheduled for a  follow-up visit in 1 month.    Follow Up:   Return in about 4 weeks (around 6/17/2024) for Recheck.    Any medications prescribed have been sent electronically to   [facility] [address] [place] [address] - [address] - [phone]  - [phone] FX  [address] [address]  Phone: [phone] Fax: [phone]          Patient or patient representative verbalized consent for the use of Ambient Listening during the visit with  Carson Mcadams MD for chart documentation. 5/20/2024  14:04 EDT      Carson Mcadams MD  [facility]   05/20/24  13:14 EDT

## 2024-05-23 ENCOUNTER — LAB (OUTPATIENT)
Dept: INTERNAL MEDICINE | Facility: CLINIC | Age: 74
End: 2024-05-23
Payer: MEDICARE

## 2024-05-23 DIAGNOSIS — R25.2 LEG CRAMPING: ICD-10-CM

## 2024-05-23 LAB
ALBUMIN SERPL-MCNC: 4.5 G/DL (ref 3.5–5.2)
ALBUMIN/GLOB SERPL: 1.7 G/DL
ALP SERPL-CCNC: 100 U/L (ref 39–117)
ALT SERPL W P-5'-P-CCNC: 15 U/L (ref 1–41)
ANION GAP SERPL CALCULATED.3IONS-SCNC: 8.8 MMOL/L (ref 5–15)
AST SERPL-CCNC: 26 U/L (ref 1–40)
BASOPHILS # BLD AUTO: 0.03 10*3/MM3 (ref 0–0.2)
BASOPHILS NFR BLD AUTO: 0.3 % (ref 0–1.5)
BILIRUB SERPL-MCNC: 0.3 MG/DL (ref 0–1.2)
BUN SERPL-MCNC: 16 MG/DL (ref 8–23)
BUN/CREAT SERPL: 14.2 (ref 7–25)
CALCIUM SPEC-SCNC: 10.6 MG/DL (ref 8.6–10.5)
CHLORIDE SERPL-SCNC: 103 MMOL/L (ref 98–107)
CO2 SERPL-SCNC: 30.2 MMOL/L (ref 22–29)
CREAT SERPL-MCNC: 1.13 MG/DL (ref 0.76–1.27)
DEPRECATED RDW RBC AUTO: 43 FL (ref 37–54)
EGFRCR SERPLBLD CKD-EPI 2021: 69.1 ML/MIN/1.73
EOSINOPHIL # BLD AUTO: 0.07 10*3/MM3 (ref 0–0.4)
EOSINOPHIL NFR BLD AUTO: 0.7 % (ref 0.3–6.2)
ERYTHROCYTE [DISTWIDTH] IN BLOOD BY AUTOMATED COUNT: 12.2 % (ref 12.3–15.4)
GLOBULIN UR ELPH-MCNC: 2.6 GM/DL
GLUCOSE SERPL-MCNC: 103 MG/DL (ref 65–99)
HCT VFR BLD AUTO: 42.2 % (ref 37.5–51)
HGB BLD-MCNC: 14 G/DL (ref 13–17.7)
IMM GRANULOCYTES # BLD AUTO: 0.05 10*3/MM3 (ref 0–0.05)
IMM GRANULOCYTES NFR BLD AUTO: 0.5 % (ref 0–0.5)
LYMPHOCYTES # BLD AUTO: 2.88 10*3/MM3 (ref 0.7–3.1)
LYMPHOCYTES NFR BLD AUTO: 27.6 % (ref 19.6–45.3)
MAGNESIUM SERPL-MCNC: 2.3 MG/DL (ref 1.6–2.4)
MCH RBC QN AUTO: 32.3 PG (ref 26.6–33)
MCHC RBC AUTO-ENTMCNC: 33.2 G/DL (ref 31.5–35.7)
MCV RBC AUTO: 97.5 FL (ref 79–97)
MONOCYTES # BLD AUTO: 0.8 10*3/MM3 (ref 0.1–0.9)
MONOCYTES NFR BLD AUTO: 7.7 % (ref 5–12)
NEUTROPHILS NFR BLD AUTO: 6.62 10*3/MM3 (ref 1.7–7)
NEUTROPHILS NFR BLD AUTO: 63.2 % (ref 42.7–76)
NRBC BLD AUTO-RTO: 0 /100 WBC (ref 0–0.2)
PHOSPHATE SERPL-MCNC: 3.4 MG/DL (ref 2.5–4.5)
PLATELET # BLD AUTO: 201 10*3/MM3 (ref 140–450)
PMV BLD AUTO: 11.1 FL (ref 6–12)
POTASSIUM SERPL-SCNC: 4.6 MMOL/L (ref 3.5–5.2)
PROT SERPL-MCNC: 7.1 G/DL (ref 6–8.5)
RBC # BLD AUTO: 4.33 10*6/MM3 (ref 4.14–5.8)
SODIUM SERPL-SCNC: 142 MMOL/L (ref 136–145)
WBC NRBC COR # BLD AUTO: 10.45 10*3/MM3 (ref 3.4–10.8)

## 2024-05-23 PROCEDURE — 84100 ASSAY OF PHOSPHORUS: CPT | Performed by: STUDENT IN AN ORGANIZED HEALTH CARE EDUCATION/TRAINING PROGRAM

## 2024-05-23 PROCEDURE — 80053 COMPREHEN METABOLIC PANEL: CPT | Performed by: STUDENT IN AN ORGANIZED HEALTH CARE EDUCATION/TRAINING PROGRAM

## 2024-05-23 PROCEDURE — 85025 COMPLETE CBC W/AUTO DIFF WBC: CPT | Performed by: STUDENT IN AN ORGANIZED HEALTH CARE EDUCATION/TRAINING PROGRAM

## 2024-05-23 PROCEDURE — 83735 ASSAY OF MAGNESIUM: CPT | Performed by: STUDENT IN AN ORGANIZED HEALTH CARE EDUCATION/TRAINING PROGRAM

## 2024-05-28 ENCOUNTER — TELEPHONE (OUTPATIENT)
Dept: INTERNAL MEDICINE | Facility: CLINIC | Age: 74
End: 2024-05-28
Payer: MEDICARE

## 2024-06-02 DIAGNOSIS — E78.49 OTHER HYPERLIPIDEMIA: ICD-10-CM

## 2024-06-03 RX ORDER — ROSUVASTATIN CALCIUM 20 MG/1
TABLET, COATED ORAL
Qty: 90 TABLET | Refills: 0 | Status: SHIPPED | OUTPATIENT
Start: 2024-06-03

## 2024-06-03 RX ORDER — DULOXETIN HYDROCHLORIDE 30 MG/1
30 CAPSULE, DELAYED RELEASE ORAL DAILY
Qty: 90 CAPSULE | Refills: 0 | Status: SHIPPED | OUTPATIENT
Start: 2024-06-03

## 2024-06-07 ENCOUNTER — OFFICE VISIT (OUTPATIENT)
Dept: CARDIOLOGY | Facility: HOSPITAL | Age: 74
End: 2024-06-07
Payer: MEDICARE

## 2024-06-07 ENCOUNTER — HOSPITAL ENCOUNTER (OUTPATIENT)
Dept: CARDIOLOGY | Facility: HOSPITAL | Age: 74
Discharge: HOME | End: 2024-06-07
Payer: MEDICARE

## 2024-06-07 VITALS
HEART RATE: 82 BPM | TEMPERATURE: 97.8 F | HEIGHT: 66 IN | BODY MASS INDEX: 24.93 KG/M2 | RESPIRATION RATE: 20 BRPM | WEIGHT: 155.13 LBS | SYSTOLIC BLOOD PRESSURE: 101 MMHG | OXYGEN SATURATION: 97 % | DIASTOLIC BLOOD PRESSURE: 63 MMHG

## 2024-06-07 DIAGNOSIS — R42 DIZZINESS: ICD-10-CM

## 2024-06-07 DIAGNOSIS — R42 DIZZINESS: Primary | ICD-10-CM

## 2024-06-07 PROCEDURE — 93005 ELECTROCARDIOGRAM TRACING: CPT | Performed by: NURSE PRACTITIONER

## 2024-06-07 NOTE — PROGRESS NOTES
"[facility]    Chief Complaint  Establish Care and Dizziness    Subjective    History of Present Illness {CC  Problem List  Visit  Diagnosis   Encounters  Notes  Medications  Labs  Result Review Imaging  Media :23}     Emmanuel Bermudez presents to [facility] for   History of Present Illness     74-year-old male with history of idiopathic hypotension, chronic nausea, chronic low back pain with sciatica, Lewy body dementia with behavioral disturbance, Parkinson's disease, lumbar spondylosis    Patient complaining of postural dizziness and near syncope.    PCP has ordered echocardiogram, Carotid duplex. Schedule but not completed yet.     Pt goes to PT 2 times per week.  He was having dizziness with activity.  PT reduced his activity levels and his s/s improved.  Pt is primarily sedentary at home.      No CP or pressure.  Pt has dyspnea with minimal activity.  Recovers quickly.  Has progressed with PT in terms of duration.  NO near syncope, syncope.  NO edema. Occasional HA but not frequent. NO palpitations.     Objective     Vital Signs:   Vitals:    06/07/24 1448 06/07/24 1450   BP: 95/61 101/63   BP Location: Left arm Left arm   Patient Position: Standing Sitting   Cuff Size: Adult Adult   Pulse: 87 82   Resp:  20   Temp:  97.8 °F (36.6 °C)   TempSrc:  Temporal   SpO2: 96% 97%   Weight:  70.4 kg (155 lb 2 oz)   Height:  167.6 cm (66\")     Body mass index is 25.04 kg/m².  Physical Exam  Vitals reviewed.   Constitutional:       General: He is not in acute distress.  Neck:      Vascular: No carotid bruit.   Cardiovascular:      Rate and Rhythm: Normal rate and regular rhythm.      Heart sounds: No murmur heard.  Pulmonary:      Effort: Pulmonary effort is normal.      Breath sounds: Normal breath sounds.   Musculoskeletal:      Right lower leg: No edema.      Left lower leg: No edema.   Skin:     Coloration: Skin is not " pale.   Neurological:      Mental Status: He is alert.   Psychiatric:         Mood and Affect: Mood normal.         Behavior: Behavior normal. Behavior is cooperative.              Result Review  Data Reviewed:{ Labs  Result Review  Imaging  Med Tab  Media :23}   Lab on 05/23/2024   Component Date Value Ref Range Status    Glucose 05/23/2024 103 (H)  65 - 99 mg/dL Final    BUN 05/23/2024 16  8 - 23 mg/dL Final    Creatinine 05/23/2024 1.13  0.76 - 1.27 mg/dL Final    Sodium 05/23/2024 142  136 - 145 mmol/L Final    Potassium 05/23/2024 4.6  3.5 - 5.2 mmol/L Final    Chloride 05/23/2024 103  98 - 107 mmol/L Final    CO2 05/23/2024 30.2 (H)  22.0 - 29.0 mmol/L Final    Calcium 05/23/2024 10.6 (H)  8.6 - 10.5 mg/dL Final    Total Protein 05/23/2024 7.1  6.0 - 8.5 g/dL Final    Albumin 05/23/2024 4.5  3.5 - 5.2 g/dL Final    ALT (SGPT) 05/23/2024 15  1 - 41 U/L Final    AST (SGOT) 05/23/2024 26  1 - 40 U/L Final    Alkaline Phosphatase 05/23/2024 100  39 - 117 U/L Final    Total Bilirubin 05/23/2024 0.3  0.0 - 1.2 mg/dL Final    Globulin 05/23/2024 2.6  gm/dL Final    A/G Ratio 05/23/2024 1.7  g/dL Final    BUN/Creatinine Ratio 05/23/2024 14.2  7.0 - 25.0 Final    Anion Gap 05/23/2024 8.8  5.0 - 15.0 mmol/L Final    eGFR 05/23/2024 69.1  >60.0 mL/min/1.73 Final    Magnesium 05/23/2024 2.3  1.6 - 2.4 mg/dL Final    Phosphorus 05/23/2024 3.4  2.5 - 4.5 mg/dL Final    WBC 05/23/2024 10.45  3.40 - 10.80 10*3/mm3 Final    RBC 05/23/2024 4.33  4.14 - 5.80 10*6/mm3 Final    Hemoglobin 05/23/2024 14.0  13.0 - 17.7 g/dL Final    Hematocrit 05/23/2024 42.2  37.5 - 51.0 % Final    MCV 05/23/2024 97.5 (H)  79.0 - 97.0 fL Final    MCH 05/23/2024 32.3  26.6 - 33.0 pg Final    MCHC 05/23/2024 33.2  31.5 - 35.7 g/dL Final    RDW 05/23/2024 12.2 (L)  12.3 - 15.4 % Final    RDW-SD 05/23/2024 43.0  37.0 - 54.0 fl Final    MPV 05/23/2024 11.1  6.0 - 12.0 fL Final    Platelets 05/23/2024 201  140 - 450 10*3/mm3 Final    Neutrophil %  05/23/2024 63.2  42.7 - 76.0 % Final    Lymphocyte % 05/23/2024 27.6  19.6 - 45.3 % Final    Monocyte % 05/23/2024 7.7  5.0 - 12.0 % Final    Eosinophil % 05/23/2024 0.7  0.3 - 6.2 % Final    Basophil % 05/23/2024 0.3  0.0 - 1.5 % Final    Immature Grans % 05/23/2024 0.5  0.0 - 0.5 % Final    Neutrophils, Absolute 05/23/2024 6.62  1.70 - 7.00 10*3/mm3 Final    Lymphocytes, Absolute 05/23/2024 2.88  0.70 - 3.10 10*3/mm3 Final    Monocytes, Absolute 05/23/2024 0.80  0.10 - 0.90 10*3/mm3 Final    Eosinophils, Absolute 05/23/2024 0.07  0.00 - 0.40 10*3/mm3 Final    Basophils, Absolute 05/23/2024 0.03  0.00 - 0.20 10*3/mm3 Final    Immature Grans, Absolute 05/23/2024 0.05  0.00 - 0.05 10*3/mm3 Final    nRBC 05/23/2024 0.0  0.0 - 0.2 /100 WBC Final                   Assessment and Plan {CC Problem List  Visit Diagnosis  ROS  Review (Popup)  Health Maintenance  Quality  BestPractice  Medications  SmartSets  SnapShot Encounters  Media :23}   1. Dizziness  Reports symptoms have improved with adjusting physical therapy regimen.  - ECG 12 Lead; normal sinus rhythm 78 bpm    Patient does have a slight drop in her blood pressure with standing.  Questionable postural dizziness/orthostatic hypotension.      Patient encouraged to complete echocardiogram and carotid duplex as scheduled.    Discussed possibilities of placing heart monitor to rule out arrhythmias.  Education provided.  Patient declined.  If symptoms return or worsen encourage patient to consider heart monitor.    Long history of normal low blood pressure.  Patient not on any heart rate or blood pressure lowering medications.  Encouraged to stay well-hydrated.  Encouraged to change positions slowly.  Discussed compression stockings for venous return.    At this time would not add midodrine or Florinef, but could be considered if symptoms worsen    Discussed scheduling follow-up in the heart valve center, family declined at this time.  Will follow-up with  primary care provider routinely, follow-up in the heart valve center as needed pending test results and symptoms.           Follow Up {Instructions Charge Capture  Follow-up Communications :23}   Return if symptoms worsen or fail to improve.    Patient was given instructions and counseling regarding his condition or for health maintenance advice. Please see specific information pulled into the AVS if appropriate.  Patient was instructed to call the [facility] and Valve Center with any questions, concerns, or worsening symptoms.

## 2024-06-10 LAB
QT INTERVAL: 392 MS
QTC INTERVAL: 446 MS

## 2024-06-24 ENCOUNTER — HOSPITAL ENCOUNTER (OUTPATIENT)
Dept: CARDIOLOGY | Facility: HOSPITAL | Age: 74
Discharge: HOME | End: 2024-06-24
Admitting: STUDENT IN AN ORGANIZED HEALTH CARE EDUCATION/TRAINING PROGRAM
Payer: MEDICARE

## 2024-06-24 VITALS
DIASTOLIC BLOOD PRESSURE: 72 MMHG | BODY MASS INDEX: 24.91 KG/M2 | SYSTOLIC BLOOD PRESSURE: 133 MMHG | HEIGHT: 66 IN | WEIGHT: 155 LBS

## 2024-06-24 DIAGNOSIS — R42 POSTURAL DIZZINESS WITH PRESYNCOPE: ICD-10-CM

## 2024-06-24 DIAGNOSIS — R55 POSTURAL DIZZINESS WITH PRESYNCOPE: ICD-10-CM

## 2024-06-24 LAB
ASCENDING AORTA: 3.3 CM
BH CV ECHO MEAS - AO MAX PG: 5.9 MMHG
BH CV ECHO MEAS - AO ROOT DIAM: 3.5 CM
BH CV ECHO MEAS - AO V2 MAX: 121.9 CM/SEC
BH CV ECHO MEAS - EDV(CUBED): 68.6 ML
BH CV ECHO MEAS - ESV(CUBED): 23.9 ML
BH CV ECHO MEAS - FS: 29.6 %
BH CV ECHO MEAS - IVS/LVPW: 0.98 CM
BH CV ECHO MEAS - IVSD: 0.98 CM
BH CV ECHO MEAS - LA DIMENSION: 3.2 CM
BH CV ECHO MEAS - LV MASS(C)D: 129.8 GRAMS
BH CV ECHO MEAS - LV MAX PG: 2.8 MMHG
BH CV ECHO MEAS - LV MEAN PG: 1 MMHG
BH CV ECHO MEAS - LV V1 MAX: 83.7 CM/SEC
BH CV ECHO MEAS - LV V1 VTI: 14.7 CM
BH CV ECHO MEAS - LVIDD: 4.1 CM
BH CV ECHO MEAS - LVIDS: 2.9 CM
BH CV ECHO MEAS - LVOT AREA: 3.6 CM2
BH CV ECHO MEAS - LVOT DIAM: 2.13 CM
BH CV ECHO MEAS - LVPWD: 1 CM
BH CV ECHO MEAS - MV A MAX VEL: 82.2 CM/SEC
BH CV ECHO MEAS - MV E MAX VEL: 60.1 CM/SEC
BH CV ECHO MEAS - MV E/A: 0.73
BH CV ECHO MEAS - MV MAX PG: 4.1 MMHG
BH CV ECHO MEAS - MV MEAN PG: 1.8 MMHG
BH CV ECHO MEAS - PA ACC TIME: 0.1 SEC
BH CV ECHO MEAS - SV(LVOT): 52.6 ML
BH CV ECHO MEAS - SVI(LVOT): 29.2 ML/M2
BH CV VAS BP LEFT ARM: NORMAL MMHG

## 2024-06-24 PROCEDURE — 93306 TTE W/DOPPLER COMPLETE: CPT

## 2024-06-24 PROCEDURE — 93306 TTE W/DOPPLER COMPLETE: CPT | Performed by: INTERNAL MEDICINE

## 2024-07-14 ENCOUNTER — HOSPITAL ENCOUNTER (OUTPATIENT)
Dept: CARDIOLOGY | Facility: HOSPITAL | Age: 74
Discharge: HOME | End: 2024-07-14
Admitting: STUDENT IN AN ORGANIZED HEALTH CARE EDUCATION/TRAINING PROGRAM
Payer: MEDICARE

## 2024-07-14 DIAGNOSIS — R55 POSTURAL DIZZINESS WITH PRESYNCOPE: ICD-10-CM

## 2024-07-14 DIAGNOSIS — R42 POSTURAL DIZZINESS WITH PRESYNCOPE: ICD-10-CM

## 2024-07-14 LAB
BH CV XLRA MEAS LEFT DIST CCA EDV: 24.6 CM/SEC
BH CV XLRA MEAS LEFT DIST CCA PSV: 72.4 CM/SEC
BH CV XLRA MEAS LEFT DIST ICA EDV: 27.4 CM/SEC
BH CV XLRA MEAS LEFT DIST ICA PSV: 65.9 CM/SEC
BH CV XLRA MEAS LEFT ICA/CCA RATIO: 0.88
BH CV XLRA MEAS LEFT MID CCA EDV: 18.9 CM/SEC
BH CV XLRA MEAS LEFT MID CCA PSV: 66.3 CM/SEC
BH CV XLRA MEAS LEFT MID ICA EDV: 16.4 CM/SEC
BH CV XLRA MEAS LEFT MID ICA PSV: 48 CM/SEC
BH CV XLRA MEAS LEFT PROX CCA EDV: 23.1 CM/SEC
BH CV XLRA MEAS LEFT PROX CCA PSV: 65.5 CM/SEC
BH CV XLRA MEAS LEFT PROX ECA PSV: 58.8 CM/SEC
BH CV XLRA MEAS LEFT PROX ICA EDV: 19.8 CM/SEC
BH CV XLRA MEAS LEFT PROX ICA PSV: 58.4 CM/SEC
BH CV XLRA MEAS LEFT PROX SCLA PSV: 85.6 CM/SEC
BH CV XLRA MEAS LEFT VERTEBRAL A EDV: 15.3 CM/SEC
BH CV XLRA MEAS LEFT VERTEBRAL A PSV: 43.1 CM/SEC
BH CV XLRA MEAS RIGHT DIST CCA EDV: 16.6 CM/SEC
BH CV XLRA MEAS RIGHT DIST CCA PSV: 64.6 CM/SEC
BH CV XLRA MEAS RIGHT DIST ICA EDV: 10.8 CM/SEC
BH CV XLRA MEAS RIGHT DIST ICA PSV: 38.8 CM/SEC
BH CV XLRA MEAS RIGHT ICA/CCA RATIO: 0.63
BH CV XLRA MEAS RIGHT MID CCA EDV: 22.4 CM/SEC
BH CV XLRA MEAS RIGHT MID CCA PSV: 83.4 CM/SEC
BH CV XLRA MEAS RIGHT MID ICA EDV: 20.7 CM/SEC
BH CV XLRA MEAS RIGHT MID ICA PSV: 52.5 CM/SEC
BH CV XLRA MEAS RIGHT PROX CCA EDV: 21 CM/SEC
BH CV XLRA MEAS RIGHT PROX CCA PSV: 128 CM/SEC
BH CV XLRA MEAS RIGHT PROX ECA PSV: 63 CM/SEC
BH CV XLRA MEAS RIGHT PROX ICA EDV: 15.6 CM/SEC
BH CV XLRA MEAS RIGHT PROX ICA PSV: 50.6 CM/SEC
BH CV XLRA MEAS RIGHT PROX SCLA PSV: 71.6 CM/SEC
BH CV XLRA MEAS RIGHT VERTEBRAL A EDV: 13.7 CM/SEC
BH CV XLRA MEAS RIGHT VERTEBRAL A PSV: 38.8 CM/SEC

## 2024-07-14 PROCEDURE — 93880 EXTRACRANIAL BILAT STUDY: CPT | Performed by: INTERNAL MEDICINE

## 2024-07-14 PROCEDURE — 93880 EXTRACRANIAL BILAT STUDY: CPT

## 2024-07-15 PROBLEM — E78.5 HYPERLIPIDEMIA LDL GOAL <70: Status: ACTIVE | Noted: 2020-08-05

## 2024-07-15 PROBLEM — I65.21 STENOSIS OF RIGHT CAROTID ARTERY: Status: ACTIVE | Noted: 2024-07-15

## 2024-07-15 RX ORDER — ASPIRIN 81 MG/1
81 TABLET ORAL DAILY
Qty: 90 TABLET | Refills: 1 | Status: SHIPPED | OUTPATIENT
Start: 2024-07-15

## 2024-08-29 DIAGNOSIS — E78.49 OTHER HYPERLIPIDEMIA: ICD-10-CM

## 2024-08-30 RX ORDER — ROSUVASTATIN CALCIUM 20 MG/1
TABLET, COATED ORAL
Qty: 90 TABLET | Refills: 0 | Status: SHIPPED | OUTPATIENT
Start: 2024-08-30

## 2024-09-12 RX ORDER — DULOXETIN HYDROCHLORIDE 30 MG/1
30 CAPSULE, DELAYED RELEASE ORAL DAILY
Qty: 90 CAPSULE | Refills: 0 | Status: SHIPPED | OUTPATIENT
Start: 2024-09-12

## 2024-10-14 ENCOUNTER — TELEMEDICINE (OUTPATIENT)
Dept: INTERNAL MEDICINE | Facility: CLINIC | Age: 74
End: 2024-10-14
Payer: MEDICARE

## 2024-10-14 VITALS
BODY MASS INDEX: 24.53 KG/M2 | DIASTOLIC BLOOD PRESSURE: 62 MMHG | TEMPERATURE: 97.3 F | WEIGHT: 152 LBS | OXYGEN SATURATION: 97 % | HEART RATE: 84 BPM | RESPIRATION RATE: 18 BRPM | SYSTOLIC BLOOD PRESSURE: 92 MMHG

## 2024-10-14 DIAGNOSIS — Z23 ENCOUNTER FOR VACCINATION: ICD-10-CM

## 2024-10-14 DIAGNOSIS — G20.B1 PARKINSON'S DISEASE WITH DYSKINESIA WITHOUT FLUCTUATING MANIFESTATIONS: ICD-10-CM

## 2024-10-14 DIAGNOSIS — Z00.00 MEDICARE ANNUAL WELLNESS VISIT, SUBSEQUENT: Primary | ICD-10-CM

## 2024-10-14 DIAGNOSIS — I95.0 IDIOPATHIC HYPOTENSION: ICD-10-CM

## 2024-10-14 DIAGNOSIS — F02.80 LEWY BODY DEMENTIA WITHOUT BEHAVIORAL DISTURBANCE: ICD-10-CM

## 2024-10-14 DIAGNOSIS — I65.21 STENOSIS OF RIGHT CAROTID ARTERY: ICD-10-CM

## 2024-10-14 DIAGNOSIS — F33.42 RECURRENT MAJOR DEPRESSIVE DISORDER, IN FULL REMISSION: ICD-10-CM

## 2024-10-14 DIAGNOSIS — E55.9 VITAMIN D DEFICIENCY: ICD-10-CM

## 2024-10-14 DIAGNOSIS — E78.5 HYPERLIPIDEMIA LDL GOAL <70: ICD-10-CM

## 2024-10-14 DIAGNOSIS — G31.83 LEWY BODY DEMENTIA WITHOUT BEHAVIORAL DISTURBANCE: ICD-10-CM

## 2024-10-14 LAB
25(OH)D3 SERPL-MCNC: 63.7 NG/ML (ref 30–100)
ALBUMIN SERPL-MCNC: 4.2 G/DL (ref 3.5–5.2)
ALBUMIN/GLOB SERPL: 1.6 G/DL
ALP SERPL-CCNC: 77 U/L (ref 39–117)
ALT SERPL W P-5'-P-CCNC: 17 U/L (ref 1–41)
ANION GAP SERPL CALCULATED.3IONS-SCNC: 6.6 MMOL/L (ref 5–15)
AST SERPL-CCNC: 33 U/L (ref 1–40)
BASOPHILS # BLD AUTO: 0.04 10*3/MM3 (ref 0–0.2)
BASOPHILS NFR BLD AUTO: 0.5 % (ref 0–1.5)
BILIRUB SERPL-MCNC: 0.4 MG/DL (ref 0–1.2)
BUN SERPL-MCNC: 16 MG/DL (ref 8–23)
BUN/CREAT SERPL: 12.5 (ref 7–25)
CALCIUM SPEC-SCNC: 10.4 MG/DL (ref 8.6–10.5)
CHLORIDE SERPL-SCNC: 104 MMOL/L (ref 98–107)
CHOLEST SERPL-MCNC: 119 MG/DL (ref 0–200)
CO2 SERPL-SCNC: 28.4 MMOL/L (ref 22–29)
CREAT SERPL-MCNC: 1.28 MG/DL (ref 0.76–1.27)
DEPRECATED RDW RBC AUTO: 42.7 FL (ref 37–54)
EGFRCR SERPLBLD CKD-EPI 2021: 59.5 ML/MIN/1.73
EOSINOPHIL # BLD AUTO: 0.07 10*3/MM3 (ref 0–0.4)
EOSINOPHIL NFR BLD AUTO: 0.9 % (ref 0.3–6.2)
ERYTHROCYTE [DISTWIDTH] IN BLOOD BY AUTOMATED COUNT: 12.2 % (ref 12.3–15.4)
GLOBULIN UR ELPH-MCNC: 2.7 GM/DL
GLUCOSE SERPL-MCNC: 100 MG/DL (ref 65–99)
HCT VFR BLD AUTO: 41.2 % (ref 37.5–51)
HDLC SERPL-MCNC: 44 MG/DL (ref 40–60)
HGB BLD-MCNC: 13.8 G/DL (ref 13–17.7)
IMM GRANULOCYTES # BLD AUTO: 0.02 10*3/MM3 (ref 0–0.05)
IMM GRANULOCYTES NFR BLD AUTO: 0.3 % (ref 0–0.5)
LDLC SERPL CALC-MCNC: 44 MG/DL (ref 0–100)
LDLC/HDLC SERPL: 0.84 {RATIO}
LYMPHOCYTES # BLD AUTO: 2.43 10*3/MM3 (ref 0.7–3.1)
LYMPHOCYTES NFR BLD AUTO: 30.5 % (ref 19.6–45.3)
MCH RBC QN AUTO: 32.1 PG (ref 26.6–33)
MCHC RBC AUTO-ENTMCNC: 33.5 G/DL (ref 31.5–35.7)
MCV RBC AUTO: 95.8 FL (ref 79–97)
MONOCYTES # BLD AUTO: 0.76 10*3/MM3 (ref 0.1–0.9)
MONOCYTES NFR BLD AUTO: 9.5 % (ref 5–12)
NEUTROPHILS NFR BLD AUTO: 4.66 10*3/MM3 (ref 1.7–7)
NEUTROPHILS NFR BLD AUTO: 58.3 % (ref 42.7–76)
NRBC BLD AUTO-RTO: 0 /100 WBC (ref 0–0.2)
PLATELET # BLD AUTO: 195 10*3/MM3 (ref 140–450)
PMV BLD AUTO: 11.4 FL (ref 6–12)
POTASSIUM SERPL-SCNC: 4.1 MMOL/L (ref 3.5–5.2)
PROT SERPL-MCNC: 6.9 G/DL (ref 6–8.5)
RBC # BLD AUTO: 4.3 10*6/MM3 (ref 4.14–5.8)
SODIUM SERPL-SCNC: 139 MMOL/L (ref 136–145)
TRIGL SERPL-MCNC: 191 MG/DL (ref 0–150)
VLDLC SERPL-MCNC: 31 MG/DL (ref 5–40)
WBC NRBC COR # BLD AUTO: 7.98 10*3/MM3 (ref 3.4–10.8)

## 2024-10-14 PROCEDURE — 1170F FXNL STATUS ASSESSED: CPT | Performed by: STUDENT IN AN ORGANIZED HEALTH CARE EDUCATION/TRAINING PROGRAM

## 2024-10-14 PROCEDURE — 90662 IIV NO PRSV INCREASED AG IM: CPT | Performed by: STUDENT IN AN ORGANIZED HEALTH CARE EDUCATION/TRAINING PROGRAM

## 2024-10-14 PROCEDURE — 99214 OFFICE O/P EST MOD 30 MIN: CPT | Performed by: STUDENT IN AN ORGANIZED HEALTH CARE EDUCATION/TRAINING PROGRAM

## 2024-10-14 PROCEDURE — 80061 LIPID PANEL: CPT | Performed by: STUDENT IN AN ORGANIZED HEALTH CARE EDUCATION/TRAINING PROGRAM

## 2024-10-14 PROCEDURE — 82306 VITAMIN D 25 HYDROXY: CPT | Performed by: STUDENT IN AN ORGANIZED HEALTH CARE EDUCATION/TRAINING PROGRAM

## 2024-10-14 PROCEDURE — 80053 COMPREHEN METABOLIC PANEL: CPT | Performed by: STUDENT IN AN ORGANIZED HEALTH CARE EDUCATION/TRAINING PROGRAM

## 2024-10-14 PROCEDURE — 1159F MED LIST DOCD IN RCRD: CPT | Performed by: STUDENT IN AN ORGANIZED HEALTH CARE EDUCATION/TRAINING PROGRAM

## 2024-10-14 PROCEDURE — G0439 PPPS, SUBSEQ VISIT: HCPCS | Performed by: STUDENT IN AN ORGANIZED HEALTH CARE EDUCATION/TRAINING PROGRAM

## 2024-10-14 PROCEDURE — 1160F RVW MEDS BY RX/DR IN RCRD: CPT | Performed by: STUDENT IN AN ORGANIZED HEALTH CARE EDUCATION/TRAINING PROGRAM

## 2024-10-14 PROCEDURE — 1125F AMNT PAIN NOTED PAIN PRSNT: CPT | Performed by: STUDENT IN AN ORGANIZED HEALTH CARE EDUCATION/TRAINING PROGRAM

## 2024-10-14 PROCEDURE — 85025 COMPLETE CBC W/AUTO DIFF WBC: CPT | Performed by: STUDENT IN AN ORGANIZED HEALTH CARE EDUCATION/TRAINING PROGRAM

## 2024-10-14 PROCEDURE — G0008 ADMIN INFLUENZA VIRUS VAC: HCPCS | Performed by: STUDENT IN AN ORGANIZED HEALTH CARE EDUCATION/TRAINING PROGRAM

## 2024-10-14 RX ORDER — DULOXETIN HYDROCHLORIDE 30 MG/1
30 CAPSULE, DELAYED RELEASE ORAL DAILY
Qty: 90 CAPSULE | Refills: 0 | Status: SHIPPED | OUTPATIENT
Start: 2024-10-14

## 2024-10-14 NOTE — PROGRESS NOTES
Subjective   The ABCs of the Annual Wellness Visit  Medicare Wellness Visit      Emmanuel Bermudez is a 74 y.o. patient who presents for a Medicare Wellness Visit.    The following portions of the patient's history were reviewed and   updated as appropriate: allergies, current medications, past family history, past medical history, past social history, past surgical history, and problem list.    Compared to one year ago, the patient's physical   health is worse.  Compared to one year ago, the patient's mental   health is worse.    Recent Hospitalizations:  He was not admitted to the hospital during the last year.     Current Medical Providers:  Patient Care Team:  Carson Mcadams MD as PCP - General (Family Medicine)  Neelam Arthur APRN (Nurse Practitioner)    Outpatient Medications Prior to Visit   Medication Sig Dispense Refill    aspirin 81 MG EC tablet Take 1 tablet by mouth Daily. 90 tablet 1    carbidopa-levodopa (SINEMET)  MG per tablet Take 1 tablet by mouth 3 (Three) Times a Day.      carbidopa-levodopa ER (SINEMET CR)  MG per tablet START WITH 1 TABLET AT NIGHT BEFORE BED. IF NEEDED AFTER 1 WEEK, MAY INCREASE TO 2 TABLETS AT NIGHT (THIS IS IN ADDTIION TO 3 TIMES A DAY LEVODOPA YOU ARE ALREADY TAKING) DO NOT CRUSH, CHEW, OR SPLIT THIS TABLET      Cholecalciferol 25 MCG (1000 UT) capsule Take 1,000 Int'l Units by mouth Daily.      donepezil (ARICEPT) 10 MG tablet Take 1 tablet by mouth 2 (Two) Times a Day. 180 tablet 3    melatonin 5 MG tablet tablet Take 1 tablet by mouth Every Night. 30 tablet 1    memantine (NAMENDA) 10 MG tablet Take 1 tablet by mouth 2 (Two) Times a Day. 180 tablet 3    multivitamin with minerals tablet tablet Take 1 tablet by mouth Daily.      rosuvastatin (CRESTOR) 20 MG tablet TAKE 1 TABLET BY MOUTH ONCE DAILY AT NIGHT 90 tablet 0    DULoxetine (CYMBALTA) 30 MG capsule Take 1 capsule by mouth once daily 90 capsule 0     No facility-administered medications prior to  "visit.     No opioid medication identified on active medication list. I have reviewed chart for other potential  high risk medication/s and harmful drug interactions in the elderly.      Aspirin is on active medication list. Aspirin use is indicated based on review of current medical condition/s. Pros and cons of this therapy have been discussed today. Benefits of this medication outweigh potential harm.  Patient has been encouraged to continue taking this medication.  .      Patient Active Problem List   Diagnosis    Lewy body dementia without behavioral disturbance    Vitamin D deficiency    Hyperlipidemia LDL goal <70    Chronic bilateral low back pain with bilateral sciatica    Cognitive communication deficit    Minor neurocognitive disorder    Parkinson disease    Insomnia due to medical condition    Slow transit constipation    Lumbar spondylosis    Chronic nausea    Weight loss    Idiopathic hypotension    Leg cramping    Stenosis of right carotid artery    Recurrent major depressive disorder, in full remission     Advance Care Planning Advance Directive is on file.  ACP discussion was held with the patient during this visit. Patient has an advance directive in EMR which is still valid.             Objective   Vitals:    10/14/24 0839   BP: 92/62   BP Location: Right arm   Patient Position: Sitting   Cuff Size: Adult   Pulse: 84   Resp: 18   Temp: 97.3 °F (36.3 °C)   SpO2: 97%   Weight: 68.9 kg (152 lb)   PainSc:   2   PainLoc: Back       Estimated body mass index is 24.53 kg/m² as calculated from the following:    Height as of 6/24/24: 167.6 cm (66\").    Weight as of this encounter: 68.9 kg (152 lb).    BMI is within normal parameters. No other follow-up for BMI required.       Does the patient have evidence of cognitive impairment? Yes                                                                                                Health  Risk Assessment    Smoking Status:  Social History     Tobacco Use "   Smoking Status Never   Smokeless Tobacco Never     Alcohol Consumption:  Social History     Substance and Sexual Activity   Alcohol Use Never       Fall Risk Screen  STEADI Fall Risk Assessment was completed, and patient is at LOW risk for falls.Assessment completed on:10/14/2024    Depression Screening:      10/14/2024     9:35 AM   PHQ-2/PHQ-9 Depression Screening   Little interest or pleasure in doing things Not at all   Feeling down, depressed, or hopeless Not at all     Health Habits and Functional and Cognitive Screening:      10/14/2024     8:37 AM   Functional & Cognitive Status   Do you have difficulty preparing food and eating? Yes   Do you have difficulty bathing yourself, getting dressed or grooming yourself? Yes   Do you have difficulty using the toilet? No   Do you have difficulty moving around from place to place? Yes   Do you have trouble with steps or getting out of a bed or a chair? Yes   Current Diet Well Balanced Diet   Dental Exam Up to date   Eye Exam Up to date   Exercise (times per week) 5 times per week   Current Exercises Include Walking   Do you need help using the phone?  No   Are you deaf or do you have serious difficulty hearing?  No   Do you need help to go to places out of walking distance? No   Do you need help shopping? No   Do you need help preparing meals?  No   Do you need help with housework?  No   Do you need help with laundry? No   Do you need help taking your medications? No   Do you need help managing money? No   Do you ever drive or ride in a car without wearing a seat belt? No   Have you felt unusual stress, anger or loneliness in the last month? No   Who do you live with? Spouse   If you need help, do you have trouble finding someone available to you? No   Have you been bothered in the last four weeks by sexual problems? No   Do you have difficulty concentrating, remembering or making decisions? Yes           Age-appropriate Screening Schedule:  Refer to the list below  for future screening recommendations based on patient's age, sex and/or medical conditions. Orders for these recommended tests are listed in the plan section. The patient has been provided with a written plan.    Health Maintenance List  Health Maintenance   Topic Date Due    ZOSTER VACCINE (1 of 2) Never done    LIPID PANEL  09/12/2024    ANNUAL WELLNESS VISIT  10/14/2025    TDAP/TD VACCINES (3 - Td or Tdap) 09/06/2026    COLORECTAL CANCER SCREENING  10/06/2026    HEPATITIS C SCREENING  Completed    INFLUENZA VACCINE  Completed    COVID-19 Vaccine  Discontinued    Pneumococcal Vaccine 65+  Discontinued                                                                                                                                                CMS Preventative Services Quick Reference  Risk Factors Identified During Encounter  Dental Screening Recommended  Vision Screening Recommended    The above risks/problems have been discussed with the patient.  Pertinent information has been shared with the patient in the After Visit Summary.  An After Visit Summary and PPPS were made available to the patient.    Follow Up:   Next Medicare Wellness visit to be scheduled in 1 year.         Additional E&M Note during same encounter follows:  Patient has additional, significant, and separately identifiable condition(s)/problem(s) that require work above and beyond the Medicare Wellness Visit     Chief Complaint  Medicare Wellness-subsequent    Subjective   HPI  Edward is also being seen today for additional medical problem/s.    Review of Systems   All other systems reviewed and are negative.     History of Present Illness  The patient presents for evaluation of multiple medical concerns. He is accompanied by an adult female.    He reports intermittent pain, which he attributes to his diagnosed arthritis. He has been under the care of a neurologist and has not experienced any falls recently. His physical therapy sessions were  discontinued at the end of 09/2024. He experiences lower back pain after walking for approximately 15 minutes. He also mentions that he has been taking vitamin D supplements.    Immunizations:  Immunization History   Administered Date(s) Administered    COVID-19 (PFIZER) Purple Cap Monovalent 03/03/2021, 03/30/2021, 01/15/2022    Covid-19 (Pfizer) Gray Cap Monovalent 01/15/2022    Fluad Quad 65+ 10/10/2023    Flublock Quad =>18yrs 10/10/2020    Fluzone High-Dose 65+YRS 10/14/2024    Fluzone High-Dose 65+yrs 11/02/2021, 10/07/2022, 10/10/2023    Pneumococcal Polysaccharide (PPSV23) 09/08/2021    Td (TDVAX) 09/06/2016    Tdap 09/06/2016        Colorectal Screening: Up-to-date  Last Completed Colonoscopy            COLORECTAL CANCER SCREENING (COLOGUARD - Every 3 Years) Next due on 10/6/2026      10/06/2023  COLOGUARD (Done)    09/28/2023  Cologuard component of Cologuard - Stool, Per Rectum    01/27/2018  COLONOSCOPY (Done)    01/27/2018  Outside Claim: [place] COLORECT[place]CR[place]; [place] RISK IND                  CT for Smoker (Age 50-80, 20pk yr within last 15 years): Up-to-date  Bone Density/DEXA (high risk): Not applicable  Hep C (Age 18-79 once): Previously negative  HIV (Age 15-65 once) : Previously negative  PSA (Over age 50, C Level Recommendation): Did not order  US Aorta (For male smokers, age 65): Not applicable  A1c: N/A  Lipid panel: Ordered    Dermatology: pending  Ophthalmologist: Established  Dentist: Established    Tobacco Use: Low Risk  (10/14/2024)    Patient History     Smoking Tobacco Use: Never     Smokeless Tobacco Use: Never     Passive Exposure: Not on file       Social History     Substance and Sexual Activity   Alcohol Use Never        Social History     Substance and Sexual Activity   Drug Use Never        Diet/Physical activity: Counseled on 10/14/24    PHQ-2 Depression Screening  PHQ-9 Total Score: PHQ-9 Total Score:        Intimate partner violence: (Screen on initial visit, older adult with injury  or evidence of neglect): No concerns  Violence can be a problem in many people's lives, so I now ask every patient about trauma or abuse they may have experienced in a relationship.  Stress/Safety - Do you feel safe in your relationship?  Afraid/Abused - Have you ever been in a relationship where you were threatened, hurt, or afraid?  Friend/Family - Are your friends aware you have been hurt?  Emergency Plan - Do you have a safe place to go and the resources you need in an emergency?    Osteoporosis: No concerns  Men: history of low trauma fracture, androgen deprivation therapy for prostate cancer, hypogonadism, primary hyperparathyroidism, intestinal disorders.           Objective   Vital Signs:  BP 92/62 (BP Location: Right arm, Patient Position: Sitting, Cuff Size: Adult)   Pulse 84   Temp 97.3 °F (36.3 °C)   Resp 18   Wt 68.9 kg (152 lb)   SpO2 97%   BMI 24.53 kg/m²   Physical Exam  Vitals and nursing note reviewed.   Constitutional:       General: He is not in acute distress.     Appearance: Normal appearance. He is normal weight. He is not ill-appearing or toxic-appearing.   HENT:      Nose: No congestion or rhinorrhea.   Eyes:      General:         Right eye: No discharge.         Left eye: No discharge.      Conjunctiva/sclera: Conjunctivae normal.   Cardiovascular:      Rate and Rhythm: Normal rate and regular rhythm.      Heart sounds: Normal heart sounds. No murmur heard.     No friction rub.   Pulmonary:      Effort: Pulmonary effort is normal. No respiratory distress.      Breath sounds: Normal breath sounds. No wheezing or rhonchi.   Abdominal:      General: Abdomen is flat. Bowel sounds are normal. There is no distension.      Palpations: Abdomen is soft. There is no mass.      Tenderness: There is no abdominal tenderness. There is no guarding or rebound.   Musculoskeletal:      Cervical back: Normal range of motion.      Right lower leg: No edema.      Left lower leg: No edema.   Skin:      Findings: No lesion or rash.   Neurological:      General: No focal deficit present.      Mental Status: He is alert. Mental status is at baseline.      Coordination: Coordination normal.      Gait: Gait normal.   Psychiatric:         Mood and Affect: Mood normal.         Behavior: Behavior normal.         Thought Content: Thought content normal.         Judgment: Judgment normal.         The following data was reviewed by: Carson Mcadams MD on 10/14/2024:  Results  Imaging  Carotid artery stenosis less than 50%.            Assessment and Plan Additional age appropriate preventative wellness advice topics were discussed during today's preventative wellness exam(some topics already addressed during AWV portion of the note above):   Nutrition: Discussed nutrition plan with patient. Information shared in after visit summary. Goal is for a well balanced diet to enhance overall health.     Healthy Weight: Discussed current and goal BMI with patient. Steps to attain this goal discussed. Information shared in after visit summary.    Problem List Items Addressed This Visit       Lewy body dementia without behavioral disturbance    Current Assessment & Plan              Relevant Medications    DULoxetine (CYMBALTA) 30 MG capsule    Vitamin D deficiency    Relevant Orders    Vitamin D,25-Hydroxy    Hyperlipidemia LDL goal <70    Current Assessment & Plan               Relevant Orders    Lipid Panel    Parkinson disease    Relevant Orders    Ambulatory Referral to Physical Therapy for Evaluation & Treatment    Idiopathic hypotension    Relevant Orders    Comprehensive Metabolic Panel    CBC Auto Differential    Stenosis of right carotid artery    Overview     < 50% stenosis on 7/15/24 per duplex US    Echo largely normal on 6/24/2024 with only mild calcification of the aortic valve and mitral valve         Recurrent major depressive disorder, in full remission    Current Assessment & Plan              Relevant Medications     DULoxetine (CYMBALTA) 30 MG capsule     Other Visit Diagnoses       Medicare annual wellness visit, subsequent    -  Primary    Relevant Orders    Code Status and Medical Interventions:    Encounter for vaccination        Relevant Medications    Zoster Vac Recomb Adjuvanted 50 MCG/0.5ML reconstituted suspension                    Medicare annual wellness visit, subsequent    Idiopathic hypotension    Lewy body dementia without behavioral disturbance    Parkinson's disease with dyskinesia without fluctuating manifestations    Stenosis of right carotid artery    Hyperlipidemia LDL goal <70     Encounter for vaccination    Vitamin D deficiency    Recurrent major depressive disorder, in full remission      Orders Placed This Encounter   Procedures    Fluzone High-Dose 65+yrs    Comprehensive Metabolic Panel     Standing Status:   Future     Number of Occurrences:   1     Standing Expiration Date:   10/14/2025     Order Specific Question:   Release to patient     Answer:   Routine Release [[id number]]    CBC Auto Differential     Standing Status:   Future     Number of Occurrences:   1     Standing Expiration Date:   10/14/2025     Order Specific Question:   Release to patient     Answer:   Routine Release [[id number]]    Vitamin D,25-Hydroxy     Standing Status:   Future     Number of Occurrences:   1     Standing Expiration Date:   10/14/2025     Order Specific Question:   Release to patient     Answer:   Routine Release [[id number]]    Lipid Panel     Standing Status:   Future     Number of Occurrences:   1     Standing Expiration Date:   10/14/2025     Order Specific Question:   Release to patient     Answer:   Routine Release [[id number]]    Ambulatory Referral to Physical Therapy for Evaluation & Treatment     Referral Priority:   Routine     Referral Type:   Physical Therapy     Referral Reason:   Specialty Services Required     Requested Specialty:   Physical Therapy     Number of Visits Requested:   1    Code  Status and Medical Interventions:     Order Specific Question:   Code Status (Patient has no pulse and is not breathing)     Answer:   CPR (Attempt to Resuscitate) [109]     Order Specific Question:   Medical Interventions (Patient has pulse or is breathing)     Answer:   Full Support     Order Specific Question:   Comments     Answer:   Patient does not want long-term ventilation     Order Specific Question:   Level Of Support Discussed With     Answer:   Patient     New Medications Ordered This Visit   Medications    Zoster Vac Recomb Adjuvanted 50 MCG/0.5ML reconstituted suspension     Sig: Inject 0.5 mL into the appropriate muscle as directed by prescriber 1 (One) Time for 1 dose.     Dispense:  1 each     Refill:  0    DULoxetine (CYMBALTA) 30 MG capsule     Sig: Take 1 capsule by mouth Daily.     Dispense:  90 capsule     Refill:  0        Assessment & Plan  1. Degenerative Disc Disease.  He experiences pain in his lower back, especially after walking for 15 minutes. He is advised to use a lidocaine patch over the counter for localized pain relief. Physical therapy, water jogging, yoga, and Pilates are recommended to help manage symptoms. If these initial treatments are not effective, other options may be considered over multiple months.    2. Parkinson's Disease.  He is advised to continue his current medications, including Sinemet and donepezil, as neurology has not made significant changes. Physical therapy is recommended to help with mobility and strength. A neurospecific physical therapy referral will be placed for November.    3. Orthostatic Hypotension.  His blood pressure was noted to be low at 92/62, which may be due to autonomic dysfunction. Cardiology has considered medications like midodrine to increase blood pressure, but this needs to be balanced carefully to avoid falls and other symptoms. He is advised to monitor his blood pressure and report any significant changes.    4. Carotid Artery  Stenosis.  His carotid artery stenosis is less than 50 percent. He is advised to continue with baby aspirin 81 mg and rosuvastatin to manage cholesterol levels and reduce the risk of plaque buildup. The goal is to maintain LDL cholesterol below 70.    5. Vitamin D Deficiency.  A vitamin D level will be checked to determine if dosage adjustments are needed. He is currently taking vitamin D supplements.    6. Depression.  He is currently taking duloxetine 30 mg, which appears to be effective in managing symptoms of depression and neuropathic pain. He reports no symptoms of depression.    7. Health Maintenance.  His Cologuard test was conducted in 2023, and the next one is due in 2026. He does not need lung cancer or prostate cancer screening. An influenza vaccine will be administered today. His tetanus vaccine is due and will be administered at a pharmacy.      Healthcare Maintenance:  Counseling provided based on age appropriate USPSTF guidelines.  BMI is within normal parameters. No other follow-up for BMI required.    Jacksondorothy ARTEMIO Bermudez voices understanding and acceptance of this advice and will call back with any further questions or concerns. AVS with preventive healthcare tips printed for patient.     “Discussed risks/benefits to vaccination, reviewed components of the vaccine, discussed VIS, discussed informed consent, informed consent obtained. Patient/Parent was allowed to accept or refuse vaccine. Questions answered to satisfactory state of patient/Parent. We reviewed typical age appropriate and seasonally appropriate vaccinations. Reviewed immunization history and updated state vaccination form as needed. Patient was counseled on COVID-19  Influenza  Zoster    Follow Up:   Return in about 1 year (around 10/14/2025) for Medicare Wellness.        Carson Mcadams MD  [facility] [place]    Patient was given instructions and counseling regarding his condition or for health maintenance advice. Please see  specific information pulled into the AVS if appropriate.

## 2024-10-14 NOTE — PATIENT INSTRUCTIONS

## 2024-11-22 DIAGNOSIS — E78.49 OTHER HYPERLIPIDEMIA: ICD-10-CM

## 2024-11-22 RX ORDER — ROSUVASTATIN CALCIUM 20 MG/1
TABLET, COATED ORAL
Qty: 90 TABLET | Refills: 0 | Status: SHIPPED | OUTPATIENT
Start: 2024-11-22

## 2024-12-30 RX ORDER — HYDROGEN PEROXIDE 2.65 ML/100ML
81 LIQUID ORAL; TOPICAL DAILY
Qty: 90 TABLET | Refills: 0 | Status: SHIPPED | OUTPATIENT
Start: 2024-12-30

## 2025-02-15 DIAGNOSIS — E78.49 OTHER HYPERLIPIDEMIA: ICD-10-CM

## 2025-02-17 RX ORDER — ROSUVASTATIN CALCIUM 20 MG/1
TABLET, COATED ORAL
Qty: 90 TABLET | Refills: 0 | Status: SHIPPED | OUTPATIENT
Start: 2025-02-17

## 2025-03-06 ENCOUNTER — REFERRAL TRIAGE (OUTPATIENT)
Dept: CASE MANAGEMENT | Facility: OTHER | Age: 75
End: 2025-03-06
Payer: MEDICARE

## 2025-03-09 DIAGNOSIS — F33.42 RECURRENT MAJOR DEPRESSIVE DISORDER, IN FULL REMISSION: ICD-10-CM

## 2025-03-10 RX ORDER — DULOXETIN HYDROCHLORIDE 30 MG/1
30 CAPSULE, DELAYED RELEASE ORAL DAILY
Qty: 90 CAPSULE | Refills: 0 | Status: SHIPPED | OUTPATIENT
Start: 2025-03-10

## 2025-03-12 ENCOUNTER — PATIENT OUTREACH (OUTPATIENT)
Dept: CASE MANAGEMENT | Facility: OTHER | Age: 75
End: 2025-03-12
Payer: MEDICARE

## 2025-03-12 ENCOUNTER — TELEPHONE (OUTPATIENT)
Dept: CASE MANAGEMENT | Facility: OTHER | Age: 75
End: 2025-03-12
Payer: MEDICARE

## 2025-03-12 DIAGNOSIS — R41.841 COGNITIVE COMMUNICATION DEFICIT: ICD-10-CM

## 2025-03-12 DIAGNOSIS — G20.B1 PARKINSON'S DISEASE WITH DYSKINESIA WITHOUT FLUCTUATING MANIFESTATIONS: ICD-10-CM

## 2025-03-12 DIAGNOSIS — G31.83 LEWY BODY DEMENTIA WITHOUT BEHAVIORAL DISTURBANCE: Primary | ICD-10-CM

## 2025-03-12 DIAGNOSIS — F02.80 LEWY BODY DEMENTIA WITHOUT BEHAVIORAL DISTURBANCE: Primary | ICD-10-CM

## 2025-03-12 NOTE — OUTREACH NOTE
AMBULATORY CASE MANAGEMENT NOTE    Names and Relationships of Patient/Support Persons: Contact: ROSA MARIA VASQUEZ; Relationship: Emergency Contact -     Patient Outreach    Contacted pt's spouse after receiving a referral from pcp. She inquired about obtaining a wheelchair for her . She is requesting a lightweight manual wheelchair for ease of transport. She has no dme preference. Will request pcp to order and send to a dme provider of their choice.    Pt's spouse also inquired about [facility] [employer], a program she has been paying into monthly for home care services. She was unsure how to find a provider to use the program's benefits. Recommended she contact the program directly for guidance.    Cece BECKETT  Ambulatory Case Management    3/12/2025, 12:19 EDT

## 2025-03-13 NOTE — TELEPHONE ENCOUNTER
Order is in and can be sent to a DME office.  A case management referral has also been ordered and can assist with additional resources that the family needs.  Thanks!

## 2025-03-25 ENCOUNTER — PATIENT OUTREACH (OUTPATIENT)
Dept: CASE MANAGEMENT | Facility: OTHER | Age: 75
End: 2025-03-25
Payer: MEDICARE

## 2025-03-25 NOTE — OUTREACH NOTE
AMBULATORY CASE MANAGEMENT NOTE    Names and Relationships of Patient/Support Persons: Contact: ROSA MARIA VASQUEZ; Relationship: Emergency Contact -     Patient Outreach    Received vm from pt's wife inquiring about wheelchair. Called and spoke with wife. Review of chart notes indicates that Dr Mcadams's office submitted an order for a lightweight wheelchair and notes to [facility] yesterday, 3/24/25. Provided wife with [facility]'s phone number ([phone]) to follow up directly. Wife also expressed a need for a walker. Advised wife to speak with [facility], as medicare may not cover both a wheelchair and walker. Instructed wife to contact Dr Mcadams's office if [facility] indicates an order is needed.    Cece BECKETT  Ambulatory Case Management    3/25/2025, 10:39 EDT

## 2025-05-14 DIAGNOSIS — E78.49 OTHER HYPERLIPIDEMIA: ICD-10-CM

## 2025-05-14 RX ORDER — ROSUVASTATIN CALCIUM 20 MG/1
20 TABLET, COATED ORAL NIGHTLY
Qty: 90 TABLET | Refills: 0 | Status: SHIPPED | OUTPATIENT
Start: 2025-05-14

## 2025-08-07 DIAGNOSIS — E78.49 OTHER HYPERLIPIDEMIA: ICD-10-CM

## 2025-08-08 RX ORDER — ROSUVASTATIN CALCIUM 20 MG/1
20 TABLET, COATED ORAL NIGHTLY
Qty: 90 TABLET | Refills: 0 | Status: SHIPPED | OUTPATIENT
Start: 2025-08-08